# Patient Record
Sex: MALE | Race: WHITE | NOT HISPANIC OR LATINO | Employment: OTHER | URBAN - METROPOLITAN AREA
[De-identification: names, ages, dates, MRNs, and addresses within clinical notes are randomized per-mention and may not be internally consistent; named-entity substitution may affect disease eponyms.]

---

## 2017-01-17 ENCOUNTER — HOSPITAL ENCOUNTER (OUTPATIENT)
Dept: CARDIOLOGY | Facility: HOSPITAL | Age: 67
Discharge: HOME | End: 2017-01-17
Attending: INTERNAL MEDICINE | Admitting: INTERNAL MEDICINE

## 2017-02-08 ENCOUNTER — HOSPITAL ENCOUNTER (OUTPATIENT)
Dept: OTHER | Facility: HOSPITAL | Age: 67
Discharge: HOME | End: 2017-02-08
Attending: INTERNAL MEDICINE | Admitting: INTERNAL MEDICINE

## 2017-07-19 ENCOUNTER — HOSPITAL ENCOUNTER (OUTPATIENT)
Dept: CARDIOLOGY | Facility: HOSPITAL | Age: 67
Discharge: HOME | End: 2017-07-19
Attending: INTERNAL MEDICINE | Admitting: INTERNAL MEDICINE

## 2018-02-27 ENCOUNTER — HOSPITAL ENCOUNTER (OUTPATIENT)
Dept: CARDIOLOGY | Facility: HOSPITAL | Age: 68
Discharge: HOME | End: 2018-02-27
Attending: INTERNAL MEDICINE | Admitting: INTERNAL MEDICINE

## 2018-03-08 ENCOUNTER — HOSPITAL ENCOUNTER (OUTPATIENT)
Dept: OTHER | Facility: HOSPITAL | Age: 68
Discharge: HOME | End: 2018-03-08
Attending: INTERNAL MEDICINE | Admitting: INTERNAL MEDICINE

## 2018-04-04 RX ORDER — ATORVASTATIN CALCIUM 40 MG/1
20 TABLET, FILM COATED ORAL DAILY
COMMUNITY
End: 2019-07-09 | Stop reason: SDUPTHER

## 2018-04-04 RX ORDER — METHOCARBAMOL 750 MG/1
750 TABLET, FILM COATED ORAL 4 TIMES DAILY PRN
COMMUNITY
End: 2019-07-09

## 2018-04-04 RX ORDER — LISINOPRIL 20 MG/1
20 TABLET ORAL DAILY
COMMUNITY
End: 2019-12-17 | Stop reason: SDUPTHER

## 2018-04-04 RX ORDER — NITROGLYCERIN 0.4 MG/1
0.4 TABLET SUBLINGUAL
COMMUNITY
End: 2020-01-14 | Stop reason: SDUPTHER

## 2018-04-04 RX ORDER — AMLODIPINE BESYLATE 5 MG/1
5 TABLET ORAL DAILY
COMMUNITY
End: 2019-11-07 | Stop reason: SDUPTHER

## 2018-04-04 RX ORDER — ASPIRIN 81 MG/1
81 TABLET ORAL DAILY
COMMUNITY

## 2018-04-04 RX ORDER — RANOLAZINE 500 MG/1
500 TABLET, EXTENDED RELEASE ORAL 2 TIMES DAILY
COMMUNITY
End: 2020-03-13 | Stop reason: SDUPTHER

## 2018-04-04 RX ORDER — ISOSORBIDE MONONITRATE 30 MG/1
30 TABLET, EXTENDED RELEASE ORAL DAILY
COMMUNITY
End: 2019-12-17 | Stop reason: SDUPTHER

## 2018-04-04 RX ORDER — CLOPIDOGREL BISULFATE 75 MG/1
75 TABLET ORAL DAILY
COMMUNITY
End: 2019-12-17 | Stop reason: SDUPTHER

## 2018-04-04 RX ORDER — METOPROLOL TARTRATE 50 MG/1
50 TABLET, FILM COATED ORAL 2 TIMES DAILY
COMMUNITY
End: 2019-12-17 | Stop reason: SDUPTHER

## 2018-04-05 ENCOUNTER — OFFICE VISIT (OUTPATIENT)
Dept: CARDIAC SURGERY | Facility: CLINIC | Age: 68
End: 2018-04-05

## 2018-04-05 VITALS
BODY MASS INDEX: 29.98 KG/M2 | HEIGHT: 67 IN | TEMPERATURE: 97.4 F | OXYGEN SATURATION: 96 % | SYSTOLIC BLOOD PRESSURE: 144 MMHG | RESPIRATION RATE: 20 BRPM | DIASTOLIC BLOOD PRESSURE: 94 MMHG | HEART RATE: 53 BPM | WEIGHT: 191 LBS

## 2018-04-05 DIAGNOSIS — I25.718 CORONARY ARTERY DISEASE OF AUTOLOGOUS VEIN BYPASS GRAFT WITH STABLE ANGINA PECTORIS (HCC): Primary | ICD-10-CM

## 2018-04-05 DIAGNOSIS — I34.0 MODERATE MITRAL REGURGITATION: ICD-10-CM

## 2018-04-05 DIAGNOSIS — I25.5 ISCHEMIC CARDIOMYOPATHY: ICD-10-CM

## 2018-04-05 DIAGNOSIS — I50.30 CONGESTIVE HEART FAILURE WITH LV DIASTOLIC DYSFUNCTION, NYHA CLASS 3 (HCC): ICD-10-CM

## 2018-04-05 DIAGNOSIS — I48.0 PAROXYSMAL ATRIAL FIBRILLATION (HCC): ICD-10-CM

## 2018-04-05 DIAGNOSIS — Z95.1 S/P CABG X 4: ICD-10-CM

## 2018-04-05 PROCEDURE — 99204 OFFICE O/P NEW MOD 45 MIN: CPT | Performed by: THORACIC SURGERY (CARDIOTHORACIC VASCULAR SURGERY)

## 2018-04-05 RX ORDER — ATORVASTATIN CALCIUM 20 MG/1
TABLET, FILM COATED ORAL
COMMUNITY
Start: 2018-03-26 | End: 2018-04-05 | Stop reason: DRUGHIGH

## 2018-04-09 PROBLEM — I25.810 CAD (CORONARY ARTERY DISEASE), AUTOLOGOUS VEIN BYPASS GRAFT: Status: ACTIVE | Noted: 2018-04-09

## 2018-04-09 PROBLEM — I25.5 ISCHEMIC CARDIOMYOPATHY: Status: ACTIVE | Noted: 2018-04-09

## 2018-04-09 PROBLEM — Z95.1 S/P CABG X 4: Status: ACTIVE | Noted: 2018-04-09

## 2018-04-09 PROBLEM — I34.0 MODERATE MITRAL REGURGITATION: Status: ACTIVE | Noted: 2018-04-09

## 2018-04-09 PROBLEM — I50.30 CONGESTIVE HEART FAILURE WITH LV DIASTOLIC DYSFUNCTION, NYHA CLASS 3: Status: ACTIVE | Noted: 2018-04-09

## 2018-04-09 PROBLEM — I48.0 PAROXYSMAL ATRIAL FIBRILLATION: Status: ACTIVE | Noted: 2018-04-09

## 2018-04-09 NOTE — PROGRESS NOTES
4/9/2018    Seen on 4/5/18    Chief Complaint     Dyspnea    History of Present Illness:       Dear SAIGE Simon MD and Colleagues,  It was nice to see Kaveh Vences in consultation at your request. He is a 67 y.o. male with a history of Coronary artery disease status post CABG ×4 in the past, paroxysmal atrial fibrillation, diabetes and hypertension, and sleep apnea who has developed progressive shortness of breath and mild anginal symptoms.  He gets short of breath after climbing a flight of stairs however is tolerable and he is angina is low grade, intermittent and he is presently on Ranexa.  He denies syncope, orthopnea, PND or lower extremity edema he had a 2-D echocardiogram that showed an ejection fraction of approximately 40% 45% and showed moderate mitral regurgitation.  He had a cardiac cath today show significant negative coronary lesions and graft disease.  The circumflex system seems to be unprotected, and the EF was 35%.      Patient Active Problem List   Diagnosis   • Moderate mitral regurgitation   • S/P CABG x 4   • CAD (coronary artery disease), autologous vein bypass graft   • Congestive heart failure with LV diastolic dysfunction, NYHA class 3   • Ischemic cardiomyopathy   • Paroxysmal atrial fibrillation       Past Medical History:   Diagnosis Date   • Atrial fibrillation    • Coronary artery disease    • Diabetes mellitus    • Gallstones    • Hyperlipidemia    • Hypertension    • Mitral regurgitation    • Sleep apnea    • Valvular disease        Past Surgical History:   Procedure Laterality Date   • CARDIAC CATHETERIZATION  2006, 2009, 2012, 2018   • CHOLECYSTECTOMY  12/22/2015   • CORONARY ANGIOPLASTY  2006, 2009   • CORONARY ARTERY BYPASS GRAFT  05/11/2012    x2   • ROTATOR CUFF REPAIR Left        No Known Allergies      Current Outpatient Prescriptions:   •  amLODIPine (NORVASC) 5 MG tablet, Take 5 mg by mouth Daily., Disp: , Rfl:   •  aspirin 81 MG EC tablet, Take 81 mg by mouth  Daily., Disp: , Rfl:   •  atorvastatin (LIPITOR) 40 MG tablet, Take 40 mg by mouth Daily., Disp: , Rfl:   •  clopidogrel (PLAVIX) 75 MG tablet, Take 75 mg by mouth Daily., Disp: , Rfl:   •  isosorbide mononitrate (IMDUR) 30 MG 24 hr tablet, Take 30 mg by mouth Daily., Disp: , Rfl:   •  lisinopril (PRINIVIL,ZESTRIL) 20 MG tablet, Take 20 mg by mouth Daily., Disp: , Rfl:   •  metFORMIN (GLUCOPHAGE) 1000 MG tablet, Take 1,000 mg by mouth 2 (Two) Times a Day With Meals., Disp: , Rfl:   •  methocarbamol (ROBAXIN) 750 MG tablet, Take 750 mg by mouth 4 (Four) Times a Day As Needed for Muscle Spasms., Disp: , Rfl:   •  metoprolol tartrate (LOPRESSOR) 50 MG tablet, Take 50 mg by mouth 2 (Two) Times a Day., Disp: , Rfl:   •  nitroglycerin (NITROSTAT) 0.4 MG SL tablet, Place 0.4 mg under the tongue Every 5 (Five) Minutes As Needed for Chest Pain. Take no more than 3 doses in 15 minutes., Disp: , Rfl:   •  ranolazine (RANEXA) 500 MG 12 hr tablet, Take 500 mg by mouth 2 (Two) Times a Day., Disp: , Rfl:     Social History     Social History   • Marital status:      Spouse name: N/A   • Number of children: N/A   • Years of education: N/A     Occupational History   • Not on file.     Social History Main Topics   • Smoking status: Never Smoker   • Smokeless tobacco: Never Used   • Alcohol use No   • Drug use: No   • Sexual activity: Not on file     Other Topics Concern   • Not on file     Social History Narrative   • No narrative on file       Family History   Problem Relation Age of Onset   • Coronary artery disease Father    • Coronary artery disease Brother    • Heart attack Brother    • Stroke Brother    • Diabetes Maternal Uncle      Review of Systems   Constitutional: Positive for fatigue.   Respiratory: Positive for chest tightness and shortness of breath. Negative for wheezing.    Cardiovascular: Positive for palpitations. Negative for leg swelling.   Neurological: Negative for dizziness and syncope.   All other  systems reviewed and are negative.      Physical Exam:    Vital Signs:  Weight: 86.6 kg (191 lb)   Body mass index is 29.91 kg/m².  Temp: 97.4 °F (36.3 °C)   Heart Rate: 53   BP: 144/94     Physical Exam   Constitutional: He is oriented to person, place, and time. He appears well-developed and well-nourished.   HENT:   Head: Normocephalic and atraumatic.   Nose: Nose normal.   Mouth/Throat: Oropharynx is clear and moist.   Eyes: Conjunctivae are normal. Pupils are equal, round, and reactive to light.   Neck: Normal range of motion. Neck supple. No JVD present. No thyromegaly present.   Cardiovascular: Normal rate, regular rhythm and intact distal pulses.  PMI is not displaced.  Exam reveals no gallop and no friction rub.    Murmur heard.  Pulses:       Radial pulses are 2+ on the right side, and 2+ on the left side.        Dorsalis pedis pulses are 2+ on the right side, and 2+ on the left side.   2/6 systolic murmur in left sternal border   Pulmonary/Chest: Effort normal and breath sounds normal. He has no rales.   Abdominal: Soft. Bowel sounds are normal. He exhibits no distension and no mass. There is no tenderness.   Musculoskeletal: Normal range of motion. He exhibits no tenderness or deformity.   Lymphadenopathy:     He has no cervical adenopathy.   Neurological: He is alert and oriented to person, place, and time. He has normal reflexes.   Skin: Skin is warm and dry. No rash noted. No erythema.   Psychiatric: He has a normal mood and affect. His behavior is normal.   No groin or neck adenomegalies     Assessment:     Problem List Items Addressed This Visit        Cardiovascular and Mediastinum    Moderate mitral regurgitation    Relevant Medications    ranolazine (RANEXA) 500 MG 12 hr tablet    isosorbide mononitrate (IMDUR) 30 MG 24 hr tablet    amLODIPine (NORVASC) 5 MG tablet    metoprolol tartrate (LOPRESSOR) 50 MG tablet    clopidogrel (PLAVIX) 75 MG tablet    nitroglycerin (NITROSTAT) 0.4 MG SL tablet     CAD (coronary artery disease), autologous vein bypass graft - Primary    Relevant Medications    ranolazine (RANEXA) 500 MG 12 hr tablet    isosorbide mononitrate (IMDUR) 30 MG 24 hr tablet    amLODIPine (NORVASC) 5 MG tablet    metoprolol tartrate (LOPRESSOR) 50 MG tablet    clopidogrel (PLAVIX) 75 MG tablet    nitroglycerin (NITROSTAT) 0.4 MG SL tablet    Congestive heart failure with LV diastolic dysfunction, NYHA class 3    Relevant Medications    ranolazine (RANEXA) 500 MG 12 hr tablet    isosorbide mononitrate (IMDUR) 30 MG 24 hr tablet    amLODIPine (NORVASC) 5 MG tablet    metoprolol tartrate (LOPRESSOR) 50 MG tablet    clopidogrel (PLAVIX) 75 MG tablet    nitroglycerin (NITROSTAT) 0.4 MG SL tablet    Ischemic cardiomyopathy    Relevant Medications    ranolazine (RANEXA) 500 MG 12 hr tablet    isosorbide mononitrate (IMDUR) 30 MG 24 hr tablet    amLODIPine (NORVASC) 5 MG tablet    metoprolol tartrate (LOPRESSOR) 50 MG tablet    clopidogrel (PLAVIX) 75 MG tablet    nitroglycerin (NITROSTAT) 0.4 MG SL tablet    Paroxysmal atrial fibrillation    Relevant Medications    ranolazine (RANEXA) 500 MG 12 hr tablet    isosorbide mononitrate (IMDUR) 30 MG 24 hr tablet    amLODIPine (NORVASC) 5 MG tablet    metoprolol tartrate (LOPRESSOR) 50 MG tablet    clopidogrel (PLAVIX) 75 MG tablet    nitroglycerin (NITROSTAT) 0.4 MG SL tablet       Other    S/P CABG x 4      Other Visit Diagnoses    None.       Moderate mitral regurgitation  CHF class 2/3  Single-vessel coronary artery disease with and protected circumflex territory  Paroxysmal atrial fibrillation  Status post CABG with mild angina    Recommendation/Plan:     Recommend medical management for now.  The MR is not significant enough to justify surgery and I don't justify a reoperation to bypass the marginal branch, at at least with that lower level of symptoms.  There is no minimally invasive approach to complete a redo CABG to the circumflex.  Maybe high risk PCI  to the left main could be an option.    Thank you for allowing me to participate in his care.    Regards,    Thang Valdez M.D.

## 2018-10-05 ENCOUNTER — HOSPITAL ENCOUNTER (OUTPATIENT)
Dept: CARDIOLOGY | Facility: HOSPITAL | Age: 68
Discharge: HOME | End: 2018-10-05
Attending: INTERNAL MEDICINE | Admitting: INTERNAL MEDICINE

## 2018-12-06 ENCOUNTER — HOSPITAL ENCOUNTER (OUTPATIENT)
Dept: ORTHOPEDIC SURGERY | Facility: CLINIC | Age: 68
Discharge: HOME | End: 2018-12-06
Attending: PHYSICIAN ASSISTANT | Admitting: PHYSICIAN ASSISTANT

## 2018-12-13 ENCOUNTER — HOSPITAL ENCOUNTER (OUTPATIENT)
Dept: MRI IMAGING | Facility: HOSPITAL | Age: 68
Discharge: HOME | End: 2018-12-13
Attending: PHYSICIAN ASSISTANT | Admitting: PHYSICIAN ASSISTANT

## 2018-12-27 ENCOUNTER — HOSPITAL ENCOUNTER (OUTPATIENT)
Dept: OTHER | Facility: HOSPITAL | Age: 68
Discharge: HOME | End: 2018-12-27
Attending: NEUROLOGICAL SURGERY | Admitting: NEUROLOGICAL SURGERY

## 2018-12-27 LAB
ABO + RH BLD: NORMAL
ANION GAP SERPL CALC-SCNC: 9.8 MMOL/L (ref 10–20)
APTT BLD: 22.9 SEC (ref 24–31)
ARMBAND: NORMAL
BASOPHILS # BLD AUTO: 0 10*3/UL (ref 0–0.2)
BASOPHILS NFR BLD AUTO: 1 % (ref 0–2)
BILIRUB UR QL STRIP: NEGATIVE MG/DL
BLD COMPONENT TYPE: NORMAL
BLD GP AB SCN SERPL QL: NEGATIVE
BUN SERPL-MCNC: 20 MG/DL (ref 8–20)
BUN/CREAT SERPL: 25 (ref 6.2–20.3)
CALCIUM SERPL-MCNC: 9.1 MG/DL (ref 8.9–10.3)
CASTS URNS QL MICRO: NORMAL /[LPF]
CHLORIDE SERPL-SCNC: 105 MMOL/L (ref 101–111)
COLOR UR: YELLOW
CONV BACTERIA IN URINE MICRO: NEGATIVE
CONV CLARITY OF URINE: CLEAR
CONV CO2: 27 MMOL/L (ref 22–32)
CONV HYALINE CASTS IN URINE MICRO: 1 /[LPF] (ref 0–5)
CONV PROTEIN IN URINE BY AUTOMATED TEST STRIP: NEGATIVE MG/DL
CONV SMALL ROUND CELLS: NORMAL /[HPF]
CONV UROBILINOGEN IN URINE BY AUTOMATED TEST STRIP: 1 MG/DL
CREAT UR-MCNC: 0.8 MG/DL (ref 0.7–1.2)
CROSSMATCH EXPIRATION: NORMAL
CULTURE INDICATED?: NORMAL
DIFFERENTIAL METHOD BLD: (no result)
EOSINOPHIL # BLD AUTO: 0.1 10*3/UL (ref 0–0.3)
EOSINOPHIL # BLD AUTO: 1 % (ref 0–3)
ERYTHROCYTE [DISTWIDTH] IN BLOOD BY AUTOMATED COUNT: 13.4 % (ref 11.5–14.5)
GLUCOSE SERPL-MCNC: 114 MG/DL (ref 65–99)
GLUCOSE UR QL: NEGATIVE MG/DL
HCT VFR BLD AUTO: 43 % (ref 40–54)
HGB BLD-MCNC: 14.4 G/DL (ref 14–18)
HGB UR QL STRIP: NEGATIVE
INR PPP: 1
KETONES UR QL STRIP: NEGATIVE MG/DL
LEUKOCYTE ESTERASE UR QL STRIP: NEGATIVE
LYMPHOCYTES # BLD AUTO: 1.3 10*3/UL (ref 0.8–4.8)
LYMPHOCYTES NFR BLD AUTO: 36 % (ref 18–42)
MCH RBC QN AUTO: 32.5 PG (ref 26–32)
MCHC RBC AUTO-ENTMCNC: 33.5 G/DL (ref 32–36)
MCV RBC AUTO: 97 FL (ref 80–94)
MONOCYTES # BLD AUTO: 0.5 10*3/UL (ref 0.1–1.3)
MONOCYTES NFR BLD AUTO: 13 % (ref 2–11)
NEUTROPHILS # BLD AUTO: 1.8 10*3/UL (ref 2.3–8.6)
NEUTROPHILS NFR BLD AUTO: 49 % (ref 50–75)
NITRITE UR QL STRIP: NEGATIVE
NRBC BLD AUTO-RTO: 0 /100{WBCS}
NRBC/RBC NFR BLD MANUAL: 0 10*3/UL
PH UR STRIP.AUTO: 5.5 [PH] (ref 4.5–8)
PLATELET # BLD AUTO: 199 10*3/UL (ref 150–450)
PMV BLD AUTO: 7.6 FL (ref 7.4–10.4)
POTASSIUM SERPL-SCNC: 3.8 MMOL/L (ref 3.6–5.1)
PROTHROMBIN TIME: 10.1 SEC (ref 9.6–11.7)
RBC # BLD AUTO: 4.43 10*6/UL (ref 4.6–6)
RBC #/AREA URNS HPF: 1 /[HPF] (ref 0–3)
SODIUM SERPL-SCNC: 138 MMOL/L (ref 136–144)
SP GR UR: 1.02 (ref 1–1.03)
SPERM URNS QL MICRO: NORMAL /[HPF]
SQUAMOUS SPT QL MICRO: 0 /[HPF] (ref 0–5)
UNIDENT CRYS URNS QL MICRO: NORMAL /[HPF]
WBC # BLD AUTO: 3.8 10*3/UL (ref 4.5–11.5)
WBC #/AREA URNS HPF: 1 /[HPF] (ref 0–5)
YEAST SPEC QL WET PREP: NORMAL /[HPF]

## 2019-01-02 ENCOUNTER — HOSPITAL ENCOUNTER (OUTPATIENT)
Dept: PREOP | Facility: HOSPITAL | Age: 69
Setting detail: HOSPITAL OUTPATIENT SURGERY
Discharge: HOME | End: 2019-01-02
Attending: NEUROLOGICAL SURGERY | Admitting: NEUROLOGICAL SURGERY

## 2019-01-02 LAB
GLUCOSE BLD-MCNC: 138 MG/DL (ref 70–105)
GLUCOSE BLD-MCNC: 198 MG/DL (ref 70–105)
GLUCOSE BLD-MCNC: 223 MG/DL (ref 70–105)

## 2019-04-03 ENCOUNTER — CONVERSION ENCOUNTER (OUTPATIENT)
Dept: FAMILY MEDICINE CLINIC | Facility: CLINIC | Age: 69
End: 2019-04-03

## 2019-04-05 ENCOUNTER — CONVERSION ENCOUNTER (OUTPATIENT)
Dept: FAMILY MEDICINE CLINIC | Facility: CLINIC | Age: 69
End: 2019-04-05

## 2019-04-09 ENCOUNTER — CONVERSION ENCOUNTER (OUTPATIENT)
Dept: FAMILY MEDICINE CLINIC | Facility: CLINIC | Age: 69
End: 2019-04-09

## 2019-04-10 ENCOUNTER — CONVERSION ENCOUNTER (OUTPATIENT)
Dept: FAMILY MEDICINE CLINIC | Facility: CLINIC | Age: 69
End: 2019-04-10

## 2019-04-17 ENCOUNTER — CONVERSION ENCOUNTER (OUTPATIENT)
Dept: FAMILY MEDICINE CLINIC | Facility: CLINIC | Age: 69
End: 2019-04-17

## 2019-04-19 ENCOUNTER — CONVERSION ENCOUNTER (OUTPATIENT)
Dept: FAMILY MEDICINE CLINIC | Facility: CLINIC | Age: 69
End: 2019-04-19

## 2019-04-26 ENCOUNTER — CONVERSION ENCOUNTER (OUTPATIENT)
Dept: FAMILY MEDICINE CLINIC | Facility: CLINIC | Age: 69
End: 2019-04-26

## 2019-04-29 ENCOUNTER — CONVERSION ENCOUNTER (OUTPATIENT)
Dept: FAMILY MEDICINE CLINIC | Facility: CLINIC | Age: 69
End: 2019-04-29

## 2019-04-30 ENCOUNTER — CONVERSION ENCOUNTER (OUTPATIENT)
Dept: FAMILY MEDICINE CLINIC | Facility: CLINIC | Age: 69
End: 2019-04-30

## 2019-05-16 ENCOUNTER — CONVERSION ENCOUNTER (OUTPATIENT)
Dept: FAMILY MEDICINE CLINIC | Facility: CLINIC | Age: 69
End: 2019-05-16

## 2019-06-03 VITALS
OXYGEN SATURATION: 98 % | OXYGEN SATURATION: 99 % | HEART RATE: 69 BPM | DIASTOLIC BLOOD PRESSURE: 77 MMHG | OXYGEN SATURATION: 99 % | HEART RATE: 68 BPM | HEIGHT: 66 IN | HEART RATE: 52 BPM | SYSTOLIC BLOOD PRESSURE: 110 MMHG | SYSTOLIC BLOOD PRESSURE: 136 MMHG | HEART RATE: 60 BPM | DIASTOLIC BLOOD PRESSURE: 76 MMHG | BODY MASS INDEX: 27.28 KG/M2 | DIASTOLIC BLOOD PRESSURE: 63 MMHG | SYSTOLIC BLOOD PRESSURE: 108 MMHG | HEIGHT: 66 IN | SYSTOLIC BLOOD PRESSURE: 116 MMHG | SYSTOLIC BLOOD PRESSURE: 131 MMHG | OXYGEN SATURATION: 93 % | BODY MASS INDEX: 15.98 KG/M2 | OXYGEN SATURATION: 99 % | HEIGHT: 66 IN | HEIGHT: 66 IN | BODY MASS INDEX: 27.28 KG/M2 | HEART RATE: 70 BPM | HEART RATE: 60 BPM | OXYGEN SATURATION: 99 % | HEIGHT: 66 IN | HEIGHT: 66 IN | DIASTOLIC BLOOD PRESSURE: 66 MMHG | OXYGEN SATURATION: 99 % | BODY MASS INDEX: 15.98 KG/M2 | DIASTOLIC BLOOD PRESSURE: 64 MMHG | HEART RATE: 62 BPM | SYSTOLIC BLOOD PRESSURE: 106 MMHG | BODY MASS INDEX: 27.28 KG/M2 | BODY MASS INDEX: 15.98 KG/M2 | HEART RATE: 56 BPM | SYSTOLIC BLOOD PRESSURE: 124 MMHG | DIASTOLIC BLOOD PRESSURE: 67 MMHG | HEART RATE: 62 BPM | OXYGEN SATURATION: 96 % | HEIGHT: 66 IN | DIASTOLIC BLOOD PRESSURE: 63 MMHG | SYSTOLIC BLOOD PRESSURE: 113 MMHG | BODY MASS INDEX: 27.28 KG/M2 | DIASTOLIC BLOOD PRESSURE: 56 MMHG | DIASTOLIC BLOOD PRESSURE: 74 MMHG | SYSTOLIC BLOOD PRESSURE: 109 MMHG

## 2019-06-04 VITALS — HEART RATE: 75 BPM | DIASTOLIC BLOOD PRESSURE: 78 MMHG | OXYGEN SATURATION: 98 % | SYSTOLIC BLOOD PRESSURE: 127 MMHG

## 2019-06-06 NOTE — PROGRESS NOTES
Vital Signs:    Patient Profile:    68 Years Old Male  Height:     66 inches (172.72 cm)  O2 Sat:     98 %  Pulse rate: 78 / minute  BP Sittin / 78              Serial Vital Signs/Assessments:      Time               Position             BP                  Pulse    Resp    Temp   By                  114/85      75          Beata Wade CMA                                                               PEF      PreRx  PostRx  Time            O2 Sat   O2 Type         L/min   L/min   L/min    By          98 %                        Beata Wade CMA    Comments:  [facility] Session # 34  Pt tolerated procedure without any complaints.  No SOA, diaphoresis or leg pain noted.  HR and BP were stable.   By: Beata Wade CMA        Plan   Updated Medication List:   MELOXICAM 15 MG ORAL TABLET (MELOXICAM) take 1 tablet by mouth every day  RANEXA 500 MG ORAL TABLET EXTENDED RELEASE 12 HOUR (RANOLAZINE) 1 po BID  GABAPENTIN 300 MG CAPS (GABAPENTIN) TAKE 2 CAPSULE BY MOUTH EVERY 12 HOURS x 1 wk, then decrease to 1 tablet BID  HYDROCODONE-ACETAMINOPHEN 5-325 MG ORAL TABLET (HYDROCODONE-ACETAMINOPHEN) 1 po q 12 hours for prn pain  ATORVASTATIN CALCIUM 20 MG TABLET (ATORVASTATIN CALCIUM) TAKE 1 TABLET EVERY DAY  ISOSORBIDE MONONITRATE ER 30 MG ORAL TABLET EXTENDED RELEASE 24 HOUR (ISOSORBIDE MONONITRATE) Take once a day by mouth.  LISINOPRIL 20 MG TABLET (LISINOPRIL) TAKE 1 TABLET TWICE DAILY  AMLODIPINE BESYLATE 5 MG TABLET (AMLODIPINE BESYLATE) TAKE 1 TABLET EVERY DAY  METOPROLOL TARTRATE 50 MG TABLET (METOPROLOL TARTRATE) TAKE 1 TABLET TWICE DAILY  CLOPIDOGREL BISULFATE 75 MG TABS (CLOPIDOGREL BISULFATE) TAKE 1 TABLET EVERY DAY BY MOUTH  GLUCOPHAGE 1000 MG ORAL TABLET (METFORMIN HCL) Take one (1) tablet by mouth twice a day  ASPIRIN 81 MG ORAL TABLET (ASPIRIN) Take one by mouth daily  NITROSTAT 0.4 MG SUBLINGUAL TABLET SUBLINGUAL (NITROGLYCERIN) Place one tablet under tongue for chest pain as directed - PRN    New  Orders:   [facility] [CPT-]                    Medication Administration    Orders Added:  1)  [facility] [CPT-]        Electronically signed by Beata Wade CMA on 05/23/2019 at 11:21 AM  ________________________________________________________________________       Disclaimer: Converted Note message may not contain all data elements that existed in the legacy source system. Please see [employer] for the original note details.

## 2019-07-09 ENCOUNTER — OFFICE VISIT (OUTPATIENT)
Dept: CARDIOLOGY | Facility: CLINIC | Age: 69
End: 2019-07-09

## 2019-07-09 VITALS
BODY MASS INDEX: 28.94 KG/M2 | HEIGHT: 67 IN | WEIGHT: 184.4 LBS | RESPIRATION RATE: 18 BRPM | DIASTOLIC BLOOD PRESSURE: 70 MMHG | OXYGEN SATURATION: 95 % | SYSTOLIC BLOOD PRESSURE: 115 MMHG | HEART RATE: 58 BPM

## 2019-07-09 DIAGNOSIS — I34.0 MODERATE MITRAL REGURGITATION: ICD-10-CM

## 2019-07-09 DIAGNOSIS — I25.5 ISCHEMIC CARDIOMYOPATHY: ICD-10-CM

## 2019-07-09 DIAGNOSIS — I48.0 PAROXYSMAL ATRIAL FIBRILLATION (HCC): ICD-10-CM

## 2019-07-09 DIAGNOSIS — Z95.1 S/P CABG X 4: ICD-10-CM

## 2019-07-09 DIAGNOSIS — I25.718 CORONARY ARTERY DISEASE OF AUTOLOGOUS VEIN BYPASS GRAFT WITH STABLE ANGINA PECTORIS (HCC): Primary | ICD-10-CM

## 2019-07-09 DIAGNOSIS — I50.30 CONGESTIVE HEART FAILURE WITH LV DIASTOLIC DYSFUNCTION, NYHA CLASS 3 (HCC): ICD-10-CM

## 2019-07-09 PROCEDURE — 93000 ELECTROCARDIOGRAM COMPLETE: CPT | Performed by: INTERNAL MEDICINE

## 2019-07-09 PROCEDURE — 99214 OFFICE O/P EST MOD 30 MIN: CPT | Performed by: INTERNAL MEDICINE

## 2019-07-09 RX ORDER — ATORVASTATIN CALCIUM 20 MG/1
20 TABLET, FILM COATED ORAL DAILY
COMMUNITY
End: 2019-08-29 | Stop reason: SDUPTHER

## 2019-07-09 RX ORDER — HYDROCODONE BITARTRATE AND ACETAMINOPHEN 5; 325 MG/1; MG/1
1 TABLET ORAL EVERY 6 HOURS PRN
COMMUNITY
End: 2020-03-02

## 2019-07-09 NOTE — PROGRESS NOTES
Subjective:     Encounter Date:07/09/2019      Patient ID: Kaveh Vences is a 68 y.o. male.    Chief Complaint:      Dear Skinny Greene,    Mr. Kaveh Vences Is a very pleasant 67 years old gentleman with history of diabetes,  coronary artery disease, status post previous CABG in 2012, and PCI stenting.     cardiac catheterization with graft angiography showed LV ejection fraction of 40%, severe native three-vessel coronary disease with 90% calcified stenosis involving the distal left main involving the ostium of the LAD significant 99% lesion of left circumflex coronary artery.  100% occlusion of the nondominant right coronary artery was noted as well.  [place] to the LAD was patent and saphenous vein graft to marginal branch was patent as well.    Echocardiogram with LV ejection fraction of 45% with moderate mitral regurgitation  Denies any further symptoms of chest pain shortness of breath dizziness or syncope  Good functional capacity   Plan is patient conservatively from cardiac standpoint   continue current medical therapy   patient is already on maximal medical therapy   continue close monitoring  Consider repeat echocardiogram next year to assess the severity of the mitral regurgitation   follow-up in office 6 months        The following portions of the patient's history were reviewed and updated as appropriate: allergies, current medications, past family history, past medical history, past social history, past surgical history and problem list.    No Known Allergies      Current Outpatient Medications:   •  amLODIPine (NORVASC) 5 MG tablet, Take 5 mg by mouth Daily., Disp: , Rfl:   •  aspirin 81 MG EC tablet, Take 81 mg by mouth Daily., Disp: , Rfl:   •  atorvastatin (LIPITOR) 20 MG tablet, Take 20 mg by mouth Daily., Disp: , Rfl:   •  clopidogrel (PLAVIX) 75 MG tablet, Take 75 mg by mouth Daily., Disp: , Rfl:   •  HYDROcodone-acetaminophen (NORCO) 5-325 MG per tablet, Take 1 tablet by mouth Every 6 (Six) Hours As  Needed., Disp: , Rfl:   •  isosorbide mononitrate (IMDUR) 30 MG 24 hr tablet, Take 30 mg by mouth Daily., Disp: , Rfl:   •  lisinopril (PRINIVIL,ZESTRIL) 20 MG tablet, Take 20 mg by mouth Daily., Disp: , Rfl:   •  metFORMIN (GLUCOPHAGE) 1000 MG tablet, Take 1,000 mg by mouth 2 (Two) Times a Day With Meals., Disp: , Rfl:   •  metoprolol tartrate (LOPRESSOR) 50 MG tablet, Take 50 mg by mouth 2 (Two) Times a Day., Disp: , Rfl:   •  nitroglycerin (NITROSTAT) 0.4 MG SL tablet, Place 0.4 mg under the tongue Every 5 (Five) Minutes As Needed for Chest Pain. Take no more than 3 doses in 15 minutes., Disp: , Rfl:   •  ranolazine (RANEXA) 500 MG 12 hr tablet, Take 500 mg by mouth 2 (Two) Times a Day., Disp: , Rfl:     Family History   Problem Relation Age of Onset   • Coronary artery disease Father    • Coronary artery disease Brother    • Heart attack Brother    • Stroke Brother    • Diabetes Maternal Uncle        Past Medical History:   Diagnosis Date   • Atrial fibrillation (CMS/HCC)    • Coronary artery disease    • Diabetes mellitus (CMS/HCC)    • Gallstones    • Hyperlipidemia    • Hypertension    • Mitral regurgitation    • Sleep apnea    • Valvular disease        Past Surgical History:   Procedure Laterality Date   • BACK SURGERY  01/2019   • CARDIAC CATHETERIZATION  2006, 2009, 2012, 2018   • CHOLECYSTECTOMY  12/22/2015   • CORONARY ANGIOPLASTY  2006, 2009   • CORONARY ARTERY BYPASS GRAFT  05/11/2012    x2   • ROTATOR CUFF REPAIR Left        Social History     Socioeconomic History   • Marital status:      Spouse name: Not on file   • Number of children: Not on file   • Years of education: Not on file   • Highest education level: Not on file   Tobacco Use   • Smoking status: Never Smoker   • Smokeless tobacco: Never Used   Substance and Sexual Activity   • Alcohol use: No   • Drug use: No       Review of Systems   Constitution: Negative for chills, decreased appetite and malaise/fatigue.   HENT: Negative for  congestion.    Eyes: Negative for blurred vision and double vision.   Cardiovascular: Negative for chest pain, dyspnea on exertion, leg swelling, orthopnea and syncope.   Respiratory: Negative for cough and shortness of breath.    Hematologic/Lymphatic: Negative for adenopathy. Does not bruise/bleed easily.   Skin: Negative for rash.   Gastrointestinal: Negative for bloating and abdominal pain.   Neurological: Negative for dizziness and focal weakness.            Objective:         Vitals:    07/09/19 0944   BP: 115/70   Pulse: 58   Resp: 18   SpO2: 95%       Physical Exam   Constitutional: He is oriented to person, place, and time. He appears well-developed and well-nourished.   HENT:   Head: Normocephalic and atraumatic.   Eyes: Conjunctivae are normal. Pupils are equal, round, and reactive to light.   Neck: Normal range of motion. Neck supple. No thyromegaly present.   Cardiovascular: Normal rate, regular rhythm, S1 normal, S2 normal and intact distal pulses.   Pulmonary/Chest: Effort normal and breath sounds normal.   Abdominal: Soft. Bowel sounds are normal.   Musculoskeletal: He exhibits no edema.   Neurological: He is alert and oriented to person, place, and time.   Skin: Skin is warm.   Nursing note and vitals reviewed.      Lab/EKG/Echo Review:   EKG: normal EKG, normal sinus rhythm, unchanged from previous tracings, LBBB.

## 2019-08-30 RX ORDER — ATORVASTATIN CALCIUM 20 MG/1
TABLET, FILM COATED ORAL
Qty: 90 TABLET | Refills: 2 | Status: SHIPPED | OUTPATIENT
Start: 2019-08-30 | End: 2020-03-13 | Stop reason: SDUPTHER

## 2019-10-17 ENCOUNTER — OFFICE VISIT (OUTPATIENT)
Dept: NEUROSURGERY | Facility: CLINIC | Age: 69
End: 2019-10-17

## 2019-10-17 VITALS
SYSTOLIC BLOOD PRESSURE: 121 MMHG | HEART RATE: 47 BPM | BODY MASS INDEX: 29.19 KG/M2 | HEIGHT: 67 IN | WEIGHT: 186 LBS | DIASTOLIC BLOOD PRESSURE: 65 MMHG

## 2019-10-17 DIAGNOSIS — M54.50 ACUTE MIDLINE LOW BACK PAIN WITHOUT SCIATICA: Primary | ICD-10-CM

## 2019-10-17 DIAGNOSIS — T14.8XXA MUSCLE STRAIN: ICD-10-CM

## 2019-10-17 PROCEDURE — 99213 OFFICE O/P EST LOW 20 MIN: CPT | Performed by: PHYSICIAN ASSISTANT

## 2019-10-17 RX ORDER — MELOXICAM 15 MG/1
15 TABLET ORAL DAILY
Qty: 30 TABLET | Refills: 2 | Status: SHIPPED | OUTPATIENT
Start: 2019-10-17 | End: 2020-01-14

## 2019-10-17 RX ORDER — TIZANIDINE 2 MG/1
2 TABLET ORAL EVERY 8 HOURS PRN
Qty: 60 TABLET | Refills: 0 | Status: SHIPPED | OUTPATIENT
Start: 2019-10-17 | End: 2020-03-02

## 2019-10-17 NOTE — PROGRESS NOTES
Subjective     Chief Complaint   Patient presents with   • Back Pain     lumbar pain that comes and goes. increased pain in the last 3 weeks after bending over to use a chainsaw for a long period of time.       Patient ID: Kaveh Vences is a 68 y.o. male       History of Present Illness  Mr Kaveh Vences is a 68-year-old male presents the office today as an established patient.  January 2019 the patient underwent L2-3 discectomy.  He had complete resolution of his back pain and left lower extremity pain but he still some numbness and tingling.  Patient has not follow-up in our office since then since the pain had resolved.  However he states that approximately 1 month ago he developed some lower back pain.  Pain does not radiate down his legs and he has no weakness in his legs.  He did have some excruciating back pain when he was trying to cut wood with a chain saw the other day.  The pain did resolve after he discontinued this activity.  He currently does not take any medications on a regular basis.      The following portions of the patient's history were reviewed and updated as appropriate: allergies, current medications, past family history, past medical history, past social history, past surgical history and problem list    Past Medical History:   Diagnosis Date   • Atrial fibrillation (CMS/HCC)    • Coronary artery disease    • Diabetes mellitus (CMS/HCC)    • Gallstones    • Hyperlipidemia    • Hypertension    • Mitral regurgitation    • Sleep apnea    • Valvular disease        Past Surgical History:   Procedure Laterality Date   • BACK SURGERY  01/2019   • CARDIAC CATHETERIZATION  2006, 2009, 2012, 2018   • CHOLECYSTECTOMY  12/22/2015   • CORONARY ANGIOPLASTY  2006, 2009   • CORONARY ARTERY BYPASS GRAFT  05/11/2012    x2   • ROTATOR CUFF REPAIR Left          Current Outpatient Medications:   •  amLODIPine (NORVASC) 5 MG tablet, Take 5 mg by mouth Daily., Disp: , Rfl:   •  aspirin 81 MG EC tablet, Take 81 mg by  "mouth Daily., Disp: , Rfl:   •  atorvastatin (LIPITOR) 20 MG tablet, TAKE 1 TABLET EVERY DAY, Disp: 90 tablet, Rfl: 2  •  clopidogrel (PLAVIX) 75 MG tablet, Take 75 mg by mouth Daily., Disp: , Rfl:   •  HYDROcodone-acetaminophen (NORCO) 5-325 MG per tablet, Take 1 tablet by mouth Every 6 (Six) Hours As Needed., Disp: , Rfl:   •  isosorbide mononitrate (IMDUR) 30 MG 24 hr tablet, Take 30 mg by mouth Daily., Disp: , Rfl:   •  lisinopril (PRINIVIL,ZESTRIL) 20 MG tablet, Take 20 mg by mouth Daily., Disp: , Rfl:   •  metFORMIN (GLUCOPHAGE) 1000 MG tablet, Take 1,000 mg by mouth 2 (Two) Times a Day With Meals., Disp: , Rfl:   •  metoprolol tartrate (LOPRESSOR) 50 MG tablet, Take 50 mg by mouth 2 (Two) Times a Day., Disp: , Rfl:   •  nitroglycerin (NITROSTAT) 0.4 MG SL tablet, Place 0.4 mg under the tongue Every 5 (Five) Minutes As Needed for Chest Pain. Take no more than 3 doses in 15 minutes., Disp: , Rfl:   •  ranolazine (RANEXA) 500 MG 12 hr tablet, Take 500 mg by mouth 2 (Two) Times a Day., Disp: , Rfl:   •  meloxicam (MOBIC) 15 MG tablet, Take 1 tablet by mouth Daily., Disp: 30 tablet, Rfl: 2  •  tiZANidine (ZANAFLEX) 2 MG tablet, Take 1 tablet by mouth Every 8 (Eight) Hours As Needed for Muscle Spasms. Start with one at night, may increased to TID as needed, Disp: 60 tablet, Rfl: 0    Review of Systems   Constitutional: Negative for activity change, appetite change, chills, diaphoresis, fatigue, fever and unexpected weight change.   Musculoskeletal: Positive for back pain. Negative for gait problem and neck pain.   Neurological: Negative for dizziness, weakness, numbness and headaches.   Psychiatric/Behavioral: Negative for sleep disturbance.         Objective     Visit Vitals  /65 (BP Location: Left arm, Patient Position: Sitting, Cuff Size: Adult)   Pulse (!) 47   Ht 170.2 cm (67\")   Wt 84.4 kg (186 lb)   BMI 29.13 kg/m²       Physical Exam   Constitutional: He is oriented to person, place, and time. He " appears well-developed and well-nourished.   HENT:   Head: Normocephalic and atraumatic.   Eyes: Pupils are equal, round, and reactive to light.   Neck: Normal range of motion.   Pulmonary/Chest: Effort normal.   Abdominal: Soft.   Musculoskeletal: Normal range of motion.   Neurological: He is alert and oriented to person, place, and time. Gait normal.   Skin: Skin is warm and dry.   Psychiatric: He has a normal mood and affect.   Vitals reviewed.      Neurologic Exam     Mental Status   Oriented to person, place, and time.     Cranial Nerves     CN III, IV, VI   Pupils are equal, round, and reactive to light.    Motor Exam   Muscle bulk: normal  Overall muscle tone: normal    Sensory Exam   Light touch normal.     Gait, Coordination, and Reflexes     Gait  Gait: normal      Back Exam     Comments:  No midline tenderness to palpation.  No pain with movement.  No sacroiliac pain.              Assessment/Plan       Independent Review of Radiographic Studies:    Flexion-extension x-rays performed in the office today.  Is not demonstrating signs of instability in his back.    Acute midline low back pain without sciatica [M54.5]  Kaveh was seen today for back pain.    Diagnoses and all orders for this visit:    Acute midline low back pain without sciatica  -     XR Spine Lumbar Flex & Ext  -     Ambulatory Referral to Physical Therapy Evaluate and treat    Muscle strain  -     Ambulatory Referral to Physical Therapy Evaluate and treat    Other orders  -     meloxicam (MOBIC) 15 MG tablet; Take 1 tablet by mouth Daily.  -     tiZANidine (ZANAFLEX) 2 MG tablet; Take 1 tablet by mouth Every 8 (Eight) Hours As Needed for Muscle Spasms. Start with one at night, may increased to TID as needed        Since the patient is postop from a microdiscectomy with new back pain, we performed flexion-extension x-rays in the office today.  These do not demonstrate any signs of instability in his back.    At this time we are going to start  the patient on another course of meloxicam 15 mg that he can take a daily basis as well as tizanidine 2 mg that he can start taking at night and increase up to 3 times a day as needed.  Variance in the patient to physical therapy and undergo deep tissue massage to his lower back.    No focal neurological deficits nothing that would indicate or warrant an emergent MRI of his lumbar spine.  Time may be that we need to refer him to pain management undergo repeat injections into his back.    He is to follow back up in 6 weeks or sooner for any new or worsening conditions.      Return in about 6 weeks (around 11/28/2019).

## 2019-10-30 ENCOUNTER — TREATMENT (OUTPATIENT)
Dept: PHYSICAL THERAPY | Facility: CLINIC | Age: 69
End: 2019-10-30

## 2019-10-30 DIAGNOSIS — M54.50 ACUTE MIDLINE LOW BACK PAIN WITHOUT SCIATICA: Primary | ICD-10-CM

## 2019-10-30 PROCEDURE — 97161 PT EVAL LOW COMPLEX 20 MIN: CPT | Performed by: PHYSICAL THERAPIST

## 2019-10-30 NOTE — PROGRESS NOTES
Physical Therapy Initial Evaluation and Plan of Care      Patient: Kaveh Vences   : 1950  Diagnosis/ICD-10 Code:  Acute midline low back pain without sciatica [M54.5]  Referring practitioner: Alexus Pires PA-C  Date of Initial Visit: 10/30/2019  Today's Date: 10/30/2019  Patient seen for 1 sessions           Subjective Questionnaire: Oswestry: 46%      Subjective Evaluation    History of Present Illness  Date of onset: 10/9/2019    Subjective comment: 68 YOM with low back for 3 weeks which started when he leaning over using a chainsaw.  He reports the pain is right in the middle of his low back.  Denies LE sympoms.  REports history of low back surgery last year by Dr. Brandon.  Referral for deep soft ttissue work and dry needling  Patient Occupation: retired Quality of life: good    Pain  Current pain rating: 3  At best pain ratin  At worst pain ratin  Location: low back-middle  Relieving factors: rest (sitting and lying down)  Exacerbated by: Bending forward and lifting.  Progression: improved    Social Support  Lives with: spouse    Treatments  No previous or current treatments  Patient Goals  Patient goals for therapy: decreased pain, increased motion, increased strength and independence with ADLs/IADLs             Objective       Postural Observations    Additional Postural Observation Details  Loss of lordosis lumbar spine    Palpation   Left   Tenderness of the erector spinae.     Additional Palpation Details  L5 TTP    Neurological Testing     Sensation     Lumbar   Left   Intact: light touch    Right   Intact: light touch    Reflexes   Left   Patellar (L4): normal (2+)  Achilles (S1): normal (2+)    Right   Patellar (L4): normal (2+)  Achilles (S1): normal (2+)    Active Range of Motion     Additional Active Range of Motion Details  Pt. Has limited lumbar flexion and extension 50% each.  Reports dull pain with extension.  Pt. Has 0 degrees of B hip IR ROM.  Pt has 0 degrees of hip  extension.     Strength/Myotome Testing     Lumbar   Left   Normal strength    Right   Normal strength    Tests     Lumbar     Left   Negative passive SLR.     Right   Negative passive SLR.     Additional Tests Details  Hamstring tightness noted         Assessment & Plan     Assessment  Impairments: abnormal muscle firing, abnormal muscle tone, abnormal or restricted ROM, activity intolerance, lacks appropriate home exercise program and pain with function  Assessment details: Pt. Is an 68 YOM with reports of LBP and stiffness.  He presents with limited ROM in his hips and his low back with in addition to increased paraspinals tone.  He reports a 46% GOLDIE score.  Recommend cont. Physical therapy services to decrease pain, improve hip ROM and spinal ROM, and improve overall function.  Barriers to therapy: none  Prognosis: good  Functional Limitations: carrying objects, lifting and pulling  Goals  Plan Goals: STG x4 weeks.  - Pt. Will be I with HEP within 4 weeks.  -Pt. Will demonstrates WNL B hip ROM all planes  -Pt. Will demonstrate full lumbar spine AROM.  LTG x8 weeks  -Pt. Will report 0% GOLDIE impairment.  -Pt. Will good mechanics with hip hinging and squat pattern.  -Pt. Will report 0/10 pain for restorative lifting.      Plan  Therapy options: will be seen for skilled physical therapy services  Planned modality interventions: electrical stimulation/Russian stimulation and thermotherapy (hydrocollator packs)  Other planned modality interventions: Dry needling  Planned therapy interventions: manual therapy, neuromuscular re-education, strengthening, stretching, therapeutic activities, postural training, flexibility, functional ROM exercises, home exercise program, joint mobilization, body mechanics training, abdominal trunk stabilization, soft tissue mobilization and spinal/joint mobilization  Frequency: 2x week  Duration in visits: 16  Treatment plan discussed with: patient        Manual Therapy:         mins   47292;  Therapeutic Exercise:         mins  67071;     Neuromuscular Summer:        mins  88272;    Therapeutic Activity:          mins  72387;     Gait Training:           mins  29606;     Ultrasound:          mins  73451;    Electrical Stimulation:         mins  40092 ( );  Dry Needling          mins self-pay    Timed Treatment:   30   mins   Total Treatment:     30   mins    PT SIGNATURE: Hao Sierra, PT   DATE TREATMENT INITIATED: 10/30/2019    Initial Certification  Certification Period: 1/28/2020  I certify that the therapy services are furnished while this patient is under my care.  The services outlined above are required by this patient, and will be reviewed every 90 days.     PHYSICIAN: Alexus Pires PA-C      DATE:     Please sign and return via fax to [phone].. Thank you, [facility].

## 2019-11-05 ENCOUNTER — TREATMENT (OUTPATIENT)
Dept: PHYSICAL THERAPY | Facility: CLINIC | Age: 69
End: 2019-11-05

## 2019-11-05 DIAGNOSIS — M54.50 ACUTE MIDLINE LOW BACK PAIN WITHOUT SCIATICA: Primary | ICD-10-CM

## 2019-11-05 PROCEDURE — 97140 MANUAL THERAPY 1/> REGIONS: CPT | Performed by: PHYSICAL THERAPIST

## 2019-11-05 PROCEDURE — 97110 THERAPEUTIC EXERCISES: CPT | Performed by: PHYSICAL THERAPIST

## 2019-11-05 NOTE — PROGRESS NOTES
Physical Therapy Daily Progress Note      Patient: Kaveh Vences   : 1950  Diagnosis/ICD-10 Code:  Acute midline low back pain without sciatica [M54.5]  Referring practitioner: Alexus Pires PA-C  Date of Initial Visit: Type: THERAPY  Noted: 10/30/2019  Today's Date: 2019  Patient seen for 2 sessions             Subjective Pt says that he still has low back pain.     Objective   See Exercise, Manual, and Modality Logs for complete treatment.       Assessment/Plan  Good tolerance with manual techniques, having min to mild TTP.  He tolerated addition of exercises well today.  After treatment, he said that he was feeling better.    Progress per Plan of Care           Timed:         Manual Therapy:    15     mins  04269;     Therapeutic Exercise:    15     mins  32744;     Neuromuscular Summer:        mins  53833;    Therapeutic Activity:          mins  53875;     Gait Training:           mins  26009;     Ultrasound:          mins  40317;    Ionto                                   mins   95581  Self Care                            mins   39723      Un-Timed:  Electrical Stimulation:         mins  05691 ( );  Dry Needling          mins self-pay  Traction          mins 06197      Timed Treatment:   30   mins   Total Treatment:     55   mins    Jason Javier PTA  Physical Therapist

## 2019-11-08 RX ORDER — AMLODIPINE BESYLATE 5 MG/1
TABLET ORAL
Qty: 90 TABLET | Refills: 0 | Status: SHIPPED | OUTPATIENT
Start: 2019-11-08 | End: 2020-03-13 | Stop reason: SDUPTHER

## 2019-11-12 ENCOUNTER — TREATMENT (OUTPATIENT)
Dept: PHYSICAL THERAPY | Facility: CLINIC | Age: 69
End: 2019-11-12

## 2019-11-12 DIAGNOSIS — M54.50 ACUTE MIDLINE LOW BACK PAIN WITHOUT SCIATICA: Primary | ICD-10-CM

## 2019-11-12 PROCEDURE — 97140 MANUAL THERAPY 1/> REGIONS: CPT | Performed by: PHYSICAL THERAPIST

## 2019-11-12 PROCEDURE — 97110 THERAPEUTIC EXERCISES: CPT | Performed by: PHYSICAL THERAPIST

## 2019-11-12 NOTE — PROGRESS NOTES
Physical Therapy Daily Progress Note    VISIT#: 3    Subjective   Kaveh Vences reports no pain at present, but comes on with bending over working or sitting too long. Pt states it's eased up a lot since last week and pt was able to bowl last night.   Pain Rating (0-10): 0    Objective     See Exercise, Manual, and Modality Logs for complete treatment. Reviewed logs for accuracy.    Patient Education: cues for therex    Assessment/Plan Pt tolerated manual today and demonstrated reduced hypertonus at lumb pvs afterwards. Pt tolerated therex well, but required cue for proper performance with most exs especially bent knee abd & spinal rotations.    Progress per Plan of Care and Progress strengthening /stabilization /functional activity            Timed:         Manual Therapy:   10      mins  58380;     Therapeutic Exercise:    20     mins  63221;     Neuromuscular Summer:        mins  26272;    Therapeutic Activity:         mins  72724;     Gait Training:           mins  04525;     Ultrasound:          mins  83446;    Ionto                                   mins   36878  Self Care                            mins   56445  Canalith Repos                   mins  03137    Un-Timed:  Electrical Stimulation:        mins  42384 ( );  Dry Needling          mins self-pay  Traction          mins 40951  Low Eval          Mins  78070  Mod Eval          Mins  66129  High Eval                            Mins  88654  Re-Eval                               mins  98890    Timed Treatment:  30    mins   Total Treatment:     30   mins    Shasha Arauz, PT  [place] License # [id number]  Physical Therapist

## 2019-11-14 ENCOUNTER — TREATMENT (OUTPATIENT)
Dept: PHYSICAL THERAPY | Facility: CLINIC | Age: 69
End: 2019-11-14

## 2019-11-14 DIAGNOSIS — M54.50 ACUTE MIDLINE LOW BACK PAIN WITHOUT SCIATICA: Primary | ICD-10-CM

## 2019-11-14 PROCEDURE — 97140 MANUAL THERAPY 1/> REGIONS: CPT | Performed by: PHYSICAL THERAPIST

## 2019-11-14 PROCEDURE — 97110 THERAPEUTIC EXERCISES: CPT | Performed by: PHYSICAL THERAPIST

## 2019-11-14 NOTE — PROGRESS NOTES
Physical Therapy Daily Progress Note    VISIT#: 4    Subjective   Kaveh Vences reports no pain at rest or with most activities. Pt is still sore with getting up after sitting too long.  Pain Rating (0-10): 0    Objective     See Exercise, Manual, and Modality Logs for complete treatment.     Patient Education: cues for therex    Assessment/Plan Pt tolerated manual and progression of exs well today.     Progress per Plan of Care and Progress strengthening /stabilization /functional activity        Timed:         Manual Therapy:    10     mins  79542;     Therapeutic Exercise:    15     mins  74474;     Neuromuscular Summer:        mins  83128;    Therapeutic Activity:          mins  30676;     Gait Training:           mins  82477;     Ultrasound:         mins  04831;    Ionto                                   mins   08507  Self Care                            mins   84096  Canalith Repos                   mins  62953    Un-Timed:  Electrical Stimulation:         mins  39143 ( );  Dry Needling          mins self-pay  Traction          mins 20224  Low Eval          Mins  40495  Mod Eval          Mins  13619  High Eval                            Mins  31468  Re-Eval                               mins  10522    Timed Treatment:  25    mins   Total Treatment:     25   mins    Shasha Arauz PT  [place] License # [id number]  Physical Therapist

## 2019-11-26 ENCOUNTER — TREATMENT (OUTPATIENT)
Dept: PHYSICAL THERAPY | Facility: CLINIC | Age: 69
End: 2019-11-26

## 2019-11-26 DIAGNOSIS — M54.50 ACUTE MIDLINE LOW BACK PAIN WITHOUT SCIATICA: Primary | ICD-10-CM

## 2019-11-26 PROCEDURE — 97110 THERAPEUTIC EXERCISES: CPT | Performed by: PHYSICAL THERAPIST

## 2019-11-26 NOTE — PROGRESS NOTES
Physical Therapy Daily Progress Note    VISIT#: 5    Subjective   Kaveh Vences reports no pain at present. Pt states pain averages 1-2/10 when sitting on a soft surface or if drives for a couple of hours, then once he stands up.   Pain Rating (0-10): 1    Objective AROM trunk flex limited 25%, ext limited 30% (dull pain), trunk rot/SBs WNL in standing with mild discomfort at end range SBs, hip ext 5 L/10 R (degrees in standing), hip abd/add/flex WFL/WNL, hip IR 20 degrees B in sitting at 90 degrees hip/knee flexion    See Exercise Logs for complete treatment.     Patient Education: cues for therex     Assessment/Plan Pt tolerated addition of seated dynadisc exs and progression of therex with increase to GTB for clams & marches fairly well. Mild discomfort with CW/CCW on dynadisc, but this was reduced with cues to limit range prn. Pt has met STG for HEP and is progressing toward full ROM hip/trunk. Held manual due to assessment and progression of exs. Continue with skilled PT.     Progress per Plan of Care and Progress strengthening /stabilization /functional activity     Timed:         Manual Therapy:         mins  42824;     Therapeutic Exercise:   53      mins  07925;     Neuromuscular Summer:        mins  50591;    Therapeutic Activity:          mins  18307;     Gait Training:           mins  02875;     Ultrasound:          mins  02734;    Ionto                                   mins   65427  Self Care                            mins   88089  Canalith Repos                  mins  54884    Un-Timed:  Electrical Stimulation:         mins  81783 ( );  Dry Needling          mins self-pay  Traction          mins 52126  Low Eval         Mins  66243  Mod Eval          Mins  27582  High Eval                            Mins  15018  Re-Eval                               mins  39983    Timed Treatment:  53    mins   Total Treatment:    53   mins    Shasha Arauz, PT  [place] License # [id number]  Physical Therapist

## 2019-12-02 ENCOUNTER — OFFICE VISIT (OUTPATIENT)
Dept: NEUROSURGERY | Facility: CLINIC | Age: 69
End: 2019-12-02

## 2019-12-02 VITALS
BODY MASS INDEX: 29.19 KG/M2 | RESPIRATION RATE: 18 BRPM | HEIGHT: 67 IN | DIASTOLIC BLOOD PRESSURE: 67 MMHG | SYSTOLIC BLOOD PRESSURE: 110 MMHG | OXYGEN SATURATION: 96 % | WEIGHT: 186 LBS | HEART RATE: 51 BPM

## 2019-12-02 DIAGNOSIS — M54.50 ACUTE BILATERAL LOW BACK PAIN WITHOUT SCIATICA: Primary | ICD-10-CM

## 2019-12-02 PROBLEM — E11.9 TYPE 2 DIABETES MELLITUS (HCC): Status: ACTIVE | Noted: 2019-12-02

## 2019-12-02 PROBLEM — E78.5 HYPERLIPIDEMIA: Status: ACTIVE | Noted: 2019-12-02

## 2019-12-02 PROBLEM — I20.9 ANGINA PECTORIS (HCC): Status: ACTIVE | Noted: 2017-01-13

## 2019-12-02 PROBLEM — I48.91 ATRIAL FIBRILLATION (HCC): Status: ACTIVE | Noted: 2019-12-02

## 2019-12-02 PROBLEM — M47.27 OSTEOARTHRITIS OF LUMBOSACRAL SPINE WITH RADICULOPATHY: Status: ACTIVE | Noted: 2018-12-06

## 2019-12-02 PROBLEM — I25.708 ATHEROSCLEROSIS OF CORONARY ARTERY BYPASS GRAFT(S), UNSPECIFIED, WITH OTHER FORMS OF ANGINA PECTORIS: Status: ACTIVE | Noted: 2018-05-16

## 2019-12-02 PROBLEM — I10 HYPERTENSION: Status: ACTIVE | Noted: 2019-12-02

## 2019-12-02 PROBLEM — I38 VALVULAR HEART DISEASE: Status: ACTIVE | Noted: 2018-03-23

## 2019-12-02 PROBLEM — I25.10 CORONARY HEART DISEASE: Status: ACTIVE | Noted: 2019-12-02

## 2019-12-02 PROBLEM — M51.26 HERNIATED LUMBAR INTERVERTEBRAL DISC: Status: ACTIVE | Noted: 2018-12-19

## 2019-12-02 PROBLEM — I25.718 ATHEROSCLEROSIS OF AUTOLOGOUS VEIN CORONARY ARTERY BYPASS GRAFT(S) WITH OTHER FORMS OF ANGINA PECTORIS (HCC): Status: ACTIVE | Noted: 2019-03-14

## 2019-12-02 PROCEDURE — 99212 OFFICE O/P EST SF 10 MIN: CPT | Performed by: PHYSICIAN ASSISTANT

## 2019-12-02 RX ORDER — IBUPROFEN 800 MG/1
800 TABLET ORAL EVERY 8 HOURS PRN
Qty: 42 TABLET | Refills: 0 | Status: SHIPPED | OUTPATIENT
Start: 2019-12-02 | End: 2019-12-16

## 2019-12-02 NOTE — PROGRESS NOTES
"Sterling Vences is a 68 y.o. male.     Chief Complaint   Patient presents with   • Follow-up     LOW BACK PAIN     Visit Vitals  /67 (BP Location: Right arm, Patient Position: Sitting, Cuff Size: Large Adult)   Pulse 51   Resp 18   Ht 170.2 cm (67\")   Wt 84.4 kg (186 lb)   SpO2 96%   BMI 29.13 kg/m²       History of Present Illness:  Mr. Kaveh Vences is a 68-year-old male who presents to the office today as a postop follow-up.  In January 2019 the patient underwent a L2-3 microdiscectomy.  We saw the patient in the office in October 2019 and the patient had developed some lower back pain below the area of the recent discectomy.  His pain did not radiate down his legs.  He states the pain is now down to about a 3 out of 10 after starting with physical therapy.  It is not debilitating.  Patient has tried meloxicam in the past and does not like the way this medication makes him feel.    The following portions of the patient's history were reviewed and updated as appropriate: allergies, current medications, past family history, past medical history, past social history, past surgical history and problem list.    Review of Systems   Constitutional: Negative for activity change, appetite change, chills, diaphoresis, fatigue and fever.   Musculoskeletal: Positive for back pain. Negative for arthralgias, gait problem, joint swelling, myalgias and neck pain.   Neurological: Negative for weakness and numbness.         Past Surgical History:   Procedure Laterality Date   • BACK SURGERY  01/2019   • CARDIAC CATHETERIZATION  2006, 2009, 2012, 2018   • CHOLECYSTECTOMY  12/22/2015   • CORONARY ANGIOPLASTY  2006, 2009   • CORONARY ARTERY BYPASS GRAFT  05/11/2012    x2   • ROTATOR CUFF REPAIR Left        Past Medical History:   Diagnosis Date   • Atrial fibrillation (CMS/HCC)    • Coronary artery disease    • Diabetes mellitus (CMS/HCC)    • Gallstones    • Hyperlipidemia    • Hypertension    • Mitral regurgitation    • " Sleep apnea    • Valvular disease        Objective   Physical Exam   Constitutional: He is oriented to person, place, and time. He appears well-developed.   HENT:   Head: Normocephalic.   Eyes: Pupils are equal, round, and reactive to light.   Neck: Normal range of motion.   Musculoskeletal: Normal range of motion.   Neurological: He is alert and oriented to person, place, and time. Gait normal.   Vitals reviewed.    Neurologic Exam     Mental Status   Oriented to person, place, and time.     Cranial Nerves     CN III, IV, VI   Pupils are equal, round, and reactive to light.    Motor Exam   Muscle bulk: normal  Overall muscle tone: normal    Sensory Exam   Light touch normal.     Gait, Coordination, and Reflexes     Gait  Gait: normal    Back Exam     Comments:  No midline tenderness    No SI tenderness    Negative hip exam                 Assessment and Plan:    At this time, Mr. Vences is doing fairly well with physical therapy alone.  His pain is down to 3 out of 10 on a regular basis.  His area of pain is several inches below the surgical incision and there is no concern for a recurrent herniated disc.  He has never had any pain down his legs.  This time he feels a little more consistent with arthritic type pains in his back.  Have offered the patient pain management injections with facet blocks.  He does not wish to undergo this at this time as the pain is not severe enough for these.  He does not want to start any meloxicam.  We discussed the use of just ibuprofen 800 mg 3 times a day for 2 weeks.  If the pain is not any better in 2 weeks and he will call the office for referral to pain management.    Patient follow-up in our office in 3 months.  If his pain is significantly better in 3 months then we do not need to see him and he can follow-up on as-needed basis      Problems Addressed this Visit        Nervous and Auditory    Low back pain - Primary

## 2019-12-05 ENCOUNTER — DOCUMENTATION (OUTPATIENT)
Dept: PHYSICAL THERAPY | Facility: CLINIC | Age: 69
End: 2019-12-05

## 2019-12-05 NOTE — PROGRESS NOTES
Discharge Summary  Discharge Summary from Physical Therapy Report      Dates  PT visit: 10/30/19 - 11/26/19  Number of Visits: 5    Discharge Status of Patient: Pt reports he's doing well with HEP and requested D/C.    Goals: Pt met STG for HEP, and was progressing well toward goal for full hip/trunk ROM at last visit seen. Pt did not return after the 5th visit so a formal reassessment was never done.     Discharge Plan: Continue with current home exercise program as instructed    Date of Discharge: 12/5/19    Shasha Arauz, PT  Physical Therapist  [place] License# [id number]

## 2019-12-17 RX ORDER — ISOSORBIDE MONONITRATE 30 MG/1
TABLET, EXTENDED RELEASE ORAL
Qty: 90 TABLET | Refills: 0 | Status: SHIPPED | OUTPATIENT
Start: 2019-12-17 | End: 2020-03-13 | Stop reason: SDUPTHER

## 2019-12-17 RX ORDER — CLOPIDOGREL BISULFATE 75 MG/1
TABLET ORAL
Qty: 90 TABLET | Refills: 0 | Status: SHIPPED | OUTPATIENT
Start: 2019-12-17 | End: 2020-01-14 | Stop reason: SDUPTHER

## 2019-12-17 RX ORDER — METOPROLOL TARTRATE 50 MG/1
TABLET, FILM COATED ORAL
Qty: 180 TABLET | Refills: 0 | Status: SHIPPED | OUTPATIENT
Start: 2019-12-17 | End: 2020-03-13 | Stop reason: SDUPTHER

## 2019-12-17 RX ORDER — LISINOPRIL 20 MG/1
TABLET ORAL
Qty: 180 TABLET | Refills: 0 | Status: SHIPPED | OUTPATIENT
Start: 2019-12-17 | End: 2020-03-13 | Stop reason: SDUPTHER

## 2020-01-14 ENCOUNTER — OFFICE VISIT (OUTPATIENT)
Dept: CARDIOLOGY | Facility: CLINIC | Age: 70
End: 2020-01-14

## 2020-01-14 ENCOUNTER — HOSPITAL ENCOUNTER (OUTPATIENT)
Dept: CARDIOLOGY | Facility: HOSPITAL | Age: 70
Discharge: HOME | End: 2020-01-14
Admitting: INTERNAL MEDICINE

## 2020-01-14 VITALS
BODY MASS INDEX: 29.66 KG/M2 | DIASTOLIC BLOOD PRESSURE: 80 MMHG | WEIGHT: 189 LBS | HEIGHT: 67 IN | SYSTOLIC BLOOD PRESSURE: 146 MMHG | HEART RATE: 52 BPM | OXYGEN SATURATION: 98 %

## 2020-01-14 DIAGNOSIS — R00.2 PALPITATIONS: Primary | ICD-10-CM

## 2020-01-14 DIAGNOSIS — R06.02 SHORTNESS OF BREATH: ICD-10-CM

## 2020-01-14 DIAGNOSIS — I10 ESSENTIAL HYPERTENSION: ICD-10-CM

## 2020-01-14 DIAGNOSIS — R00.2 PALPITATIONS: ICD-10-CM

## 2020-01-14 DIAGNOSIS — I25.10 CORONARY ARTERY DISEASE INVOLVING NATIVE CORONARY ARTERY OF NATIVE HEART WITHOUT ANGINA PECTORIS: ICD-10-CM

## 2020-01-14 DIAGNOSIS — E78.2 MIXED HYPERLIPIDEMIA: ICD-10-CM

## 2020-01-14 DIAGNOSIS — I34.0 NONRHEUMATIC MITRAL VALVE REGURGITATION: ICD-10-CM

## 2020-01-14 PROCEDURE — 93225 XTRNL ECG REC<48 HRS REC: CPT

## 2020-01-14 PROCEDURE — 99214 OFFICE O/P EST MOD 30 MIN: CPT | Performed by: INTERNAL MEDICINE

## 2020-01-14 RX ORDER — CLOPIDOGREL BISULFATE 75 MG/1
75 TABLET ORAL DAILY
Qty: 90 TABLET | Refills: 3 | Status: SHIPPED | OUTPATIENT
Start: 2020-01-14 | End: 2020-03-13 | Stop reason: SDUPTHER

## 2020-01-14 RX ORDER — NITROGLYCERIN 0.4 MG/1
0.4 TABLET SUBLINGUAL
Qty: 30 TABLET | Refills: 4 | Status: SHIPPED | OUTPATIENT
Start: 2020-01-14 | End: 2020-12-16 | Stop reason: SDUPTHER

## 2020-01-14 NOTE — PROGRESS NOTES
Subjective:     Encounter Date:01/14/2020      Patient ID: Kaveh Vences is a 69 y.o. male.    Chief Complaint:      Dear Skinny Greene,    Mr. Kaveh Vences Is a very pleasant 67 years old gentleman with history of diabetes,  coronary artery disease, status post previous CABG in 2012, and PCI stenting.     cardiac catheterization with graft angiography showed LV ejection fraction of 40%, severe native three-vessel coronary disease with 90% calcified stenosis involving the distal left main involving the ostium of the LAD significant 99% lesion of left circumflex coronary artery.  100% occlusion of the nondominant right coronary artery was noted as well.  Ashby to the LAD was patent and saphenous vein graft to marginal branch was patent as well.    Echocardiogram with LV ejection fraction of 45% with moderate mitral regurgitation    Problem list  Coronary artery disease status post coronary artery bypass surgery  Hypertension  Hyperlipidemia  Sinus bradycardia with a left bundle branch block  Symptoms of intermittent dizziness      Denies any further symptoms of chest pain shortness of breath or syncope  Complaining of intermittent dizziness that is nonspecific  Schedule patient for a Holter monitor for symptoms of dizziness palpitations and also sinus bradycardia with left bundle branch block  Good functional capacity   Plan is patient conservatively from cardiac standpoint   continue current medical therapy   patient is already on maximal medical therapy   continue close monitoring  Consider repeat echocardiogram next year to assess the severity of the mitral regurgitation   follow-up in office 6 months            Assessment:          Diagnosis Plan   1. Palpitations  Holter Monitor - 24 Hour   2. Coronary artery disease involving native coronary artery of native heart without angina pectoris     3. Essential hypertension     4. Mixed hyperlipidemia             The following portions of the patient's history were  reviewed and updated as appropriate: allergies, current medications, past family history, past medical history, past social history, past surgical history and problem list.    No Known Allergies      Current Outpatient Medications:   •  amLODIPine (NORVASC) 5 MG tablet, TAKE 1 TABLET EVERY DAY, Disp: 90 tablet, Rfl: 0  •  aspirin 81 MG EC tablet, Take 81 mg by mouth Daily., Disp: , Rfl:   •  atorvastatin (LIPITOR) 20 MG tablet, TAKE 1 TABLET EVERY DAY, Disp: 90 tablet, Rfl: 2  •  clopidogrel (PLAVIX) 75 MG tablet, Take 1 tablet by mouth Daily., Disp: 90 tablet, Rfl: 3  •  HYDROcodone-acetaminophen (NORCO) 5-325 MG per tablet, Take 1 tablet by mouth Every 6 (Six) Hours As Needed., Disp: , Rfl:   •  isosorbide mononitrate (IMDUR) 30 MG 24 hr tablet, TAKE 1 TABLET EVERY DAY, Disp: 90 tablet, Rfl: 0  •  lisinopril (PRINIVIL,ZESTRIL) 20 MG tablet, TAKE 1 TABLET TWICE DAILY, Disp: 180 tablet, Rfl: 0  •  metFORMIN (GLUCOPHAGE) 1000 MG tablet, Take 1,000 mg by mouth 2 (Two) Times a Day With Meals., Disp: , Rfl:   •  metoprolol tartrate (LOPRESSOR) 50 MG tablet, TAKE 1 TABLET TWICE DAILY, Disp: 180 tablet, Rfl: 0  •  nitroglycerin (NITROSTAT) 0.4 MG SL tablet, Place 1 tablet under the tongue Every 5 (Five) Minutes As Needed for Chest Pain. Take no more than 3 doses in 15 minutes., Disp: 30 tablet, Rfl: 4  •  ranolazine (RANEXA) 500 MG 12 hr tablet, Take 500 mg by mouth 2 (Two) Times a Day., Disp: , Rfl:   •  tiZANidine (ZANAFLEX) 2 MG tablet, Take 1 tablet by mouth Every 8 (Eight) Hours As Needed for Muscle Spasms. Start with one at night, may increased to TID as needed, Disp: 60 tablet, Rfl: 0    Family History   Problem Relation Age of Onset   • Coronary artery disease Father    • Coronary artery disease Brother    • Heart attack Brother    • Stroke Brother    • Diabetes Maternal Uncle        Past Medical History:   Diagnosis Date   • Atrial fibrillation (CMS/HCC)    • Coronary artery disease    • Diabetes mellitus  (CMS/[facility])    • Gallstones    • Hyperlipidemia    • Hypertension    • Mitral regurgitation    • Sleep apnea    • Valvular disease        Past Surgical History:   Procedure Laterality Date   • BACK SURGERY  01/2019   • CARDIAC CATHETERIZATION  2006, 2009, 2012, 2018   • CHOLECYSTECTOMY  12/22/2015   • CORONARY ANGIOPLASTY  2006, 2009   • CORONARY ARTERY BYPASS GRAFT  05/11/2012    x2   • ROTATOR CUFF REPAIR Left        Social History     Socioeconomic History   • Marital status:      Spouse name: Not on file   • Number of children: Not on file   • Years of education: Not on file   • Highest education level: Not on file   Tobacco Use   • Smoking status: Never Smoker   • Smokeless tobacco: Never Used   Substance and Sexual Activity   • Alcohol use: No   • Drug use: No   • Sexual activity: Defer       Review of Systems   Constitution: Negative for chills, decreased appetite and malaise/fatigue.   HENT: Negative for congestion.    Eyes: Negative for blurred vision and double vision.   Cardiovascular: Positive for dyspnea on exertion and palpitations. Negative for chest pain, irregular heartbeat, leg swelling, near-syncope, orthopnea, paroxysmal nocturnal dyspnea and syncope.   Respiratory: Negative for cough and shortness of breath.    Hematologic/Lymphatic: Negative for adenopathy. Does not bruise/bleed easily.   Skin: Negative for rash.   Gastrointestinal: Negative for bloating and abdominal pain.   Neurological: Positive for dizziness. Negative for focal weakness.            Objective:         Vitals:    01/14/20 0842   BP: 146/80   Pulse: 52   SpO2: 98%       Physical Exam   Constitutional: He is oriented to person, place, and time. He appears well-developed and well-nourished.   HENT:   Head: Normocephalic and atraumatic.   Eyes: Pupils are equal, round, and reactive to light. Conjunctivae are normal.   Neck: Normal range of motion. Neck supple. No thyromegaly present.   Cardiovascular: Normal rate, regular  rhythm, S1 normal, S2 normal and intact distal pulses.   Pulmonary/Chest: Effort normal and breath sounds normal.   Abdominal: Soft. Bowel sounds are normal.   Musculoskeletal: He exhibits no edema.   Neurological: He is alert and oriented to person, place, and time.   Skin: Skin is warm.   Nursing note and vitals reviewed.      Procedures                   DISPLAY PLAN FREE TEXT

## 2020-01-27 PROCEDURE — 93227 XTRNL ECG REC<48 HR R&I: CPT | Performed by: INTERNAL MEDICINE

## 2020-02-07 ENCOUNTER — TELEPHONE (OUTPATIENT)
Dept: CARDIOLOGY | Facility: CLINIC | Age: 70
End: 2020-02-07

## 2020-02-21 ENCOUNTER — HOSPITAL ENCOUNTER (EMERGENCY)
Facility: HOSPITAL | Age: 70
Discharge: HOME | End: 2020-02-21
Admitting: EMERGENCY MEDICINE

## 2020-02-21 ENCOUNTER — APPOINTMENT (OUTPATIENT)
Dept: GENERAL RADIOLOGY | Facility: HOSPITAL | Age: 70
End: 2020-02-21

## 2020-02-21 VITALS
HEIGHT: 67 IN | DIASTOLIC BLOOD PRESSURE: 78 MMHG | WEIGHT: 191.8 LBS | HEART RATE: 68 BPM | TEMPERATURE: 99.7 F | BODY MASS INDEX: 30.1 KG/M2 | RESPIRATION RATE: 19 BRPM | OXYGEN SATURATION: 94 % | SYSTOLIC BLOOD PRESSURE: 138 MMHG

## 2020-02-21 DIAGNOSIS — J10.1 INFLUENZA A: Primary | ICD-10-CM

## 2020-02-21 DIAGNOSIS — R50.9 FEVER, UNSPECIFIED FEVER CAUSE: ICD-10-CM

## 2020-02-21 LAB
ALBUMIN SERPL-MCNC: 4.3 G/DL (ref 3.5–5.2)
ALBUMIN/GLOB SERPL: 2 G/DL
ALP SERPL-CCNC: 54 U/L (ref 39–117)
ALT SERPL W P-5'-P-CCNC: 16 U/L (ref 1–41)
ANION GAP SERPL CALCULATED.3IONS-SCNC: 13 MMOL/L (ref 5–15)
AST SERPL-CCNC: 17 U/L (ref 1–40)
BASOPHILS # BLD AUTO: 0 10*3/MM3 (ref 0–0.2)
BASOPHILS NFR BLD AUTO: 0.4 % (ref 0–1.5)
BILIRUB SERPL-MCNC: 0.4 MG/DL (ref 0.2–1.2)
BUN BLD-MCNC: 15 MG/DL (ref 8–23)
BUN/CREAT SERPL: 17.2 (ref 7–25)
CALCIUM SPEC-SCNC: 9.1 MG/DL (ref 8.6–10.5)
CHLORIDE SERPL-SCNC: 97 MMOL/L (ref 98–107)
CO2 SERPL-SCNC: 25 MMOL/L (ref 22–29)
CREAT BLD-MCNC: 0.87 MG/DL (ref 0.76–1.27)
D-LACTATE SERPL-SCNC: 1.5 MMOL/L (ref 0.5–2)
DEPRECATED RDW RBC AUTO: 44.6 FL (ref 37–54)
EOSINOPHIL # BLD AUTO: 0 10*3/MM3 (ref 0–0.4)
EOSINOPHIL NFR BLD AUTO: 0.5 % (ref 0.3–6.2)
ERYTHROCYTE [DISTWIDTH] IN BLOOD BY AUTOMATED COUNT: 13.5 % (ref 12.3–15.4)
FLUAV SUBTYP SPEC NAA+PROBE: DETECTED
FLUBV RNA ISLT QL NAA+PROBE: NOT DETECTED
GFR SERPL CREATININE-BSD FRML MDRD: 87 ML/MIN/1.73
GLOBULIN UR ELPH-MCNC: 2.2 GM/DL
GLUCOSE BLD-MCNC: 219 MG/DL (ref 65–99)
HCT VFR BLD AUTO: 39.7 % (ref 37.5–51)
HGB BLD-MCNC: 13.7 G/DL (ref 13–17.7)
LYMPHOCYTES # BLD AUTO: 0.7 10*3/MM3 (ref 0.7–3.1)
LYMPHOCYTES NFR BLD AUTO: 19.4 % (ref 19.6–45.3)
MCH RBC QN AUTO: 32.8 PG (ref 26.6–33)
MCHC RBC AUTO-ENTMCNC: 34.7 G/DL (ref 31.5–35.7)
MCV RBC AUTO: 94.7 FL (ref 79–97)
MONOCYTES # BLD AUTO: 0.7 10*3/MM3 (ref 0.1–0.9)
MONOCYTES NFR BLD AUTO: 19.6 % (ref 5–12)
NEUTROPHILS # BLD AUTO: 2.3 10*3/MM3 (ref 1.7–7)
NEUTROPHILS NFR BLD AUTO: 60.1 % (ref 42.7–76)
NRBC BLD AUTO-RTO: 0.1 /100 WBC (ref 0–0.2)
PLATELET # BLD AUTO: 136 10*3/MM3 (ref 140–450)
PMV BLD AUTO: 6.7 FL (ref 6–12)
POTASSIUM BLD-SCNC: 3.9 MMOL/L (ref 3.5–5.2)
PROT SERPL-MCNC: 6.5 G/DL (ref 6–8.5)
RBC # BLD AUTO: 4.19 10*6/MM3 (ref 4.14–5.8)
SODIUM BLD-SCNC: 135 MMOL/L (ref 136–145)
WBC NRBC COR # BLD: 3.8 10*3/MM3 (ref 3.4–10.8)

## 2020-02-21 PROCEDURE — 80053 COMPREHEN METABOLIC PANEL: CPT | Performed by: NURSE PRACTITIONER

## 2020-02-21 PROCEDURE — 99283 EMERGENCY DEPT VISIT LOW MDM: CPT

## 2020-02-21 PROCEDURE — 85025 COMPLETE CBC W/AUTO DIFF WBC: CPT | Performed by: NURSE PRACTITIONER

## 2020-02-21 PROCEDURE — 71046 X-RAY EXAM CHEST 2 VIEWS: CPT

## 2020-02-21 PROCEDURE — 83605 ASSAY OF LACTIC ACID: CPT | Performed by: NURSE PRACTITIONER

## 2020-02-21 PROCEDURE — 87502 INFLUENZA DNA AMP PROBE: CPT | Performed by: NURSE PRACTITIONER

## 2020-02-21 RX ORDER — ACETAMINOPHEN 500 MG
1000 TABLET ORAL ONCE
Status: COMPLETED | OUTPATIENT
Start: 2020-02-21 | End: 2020-02-21

## 2020-02-21 RX ORDER — SODIUM CHLORIDE 0.9 % (FLUSH) 0.9 %
10 SYRINGE (ML) INJECTION AS NEEDED
Status: DISCONTINUED | OUTPATIENT
Start: 2020-02-21 | End: 2020-02-22 | Stop reason: HOSPADM

## 2020-02-21 RX ORDER — OSELTAMIVIR PHOSPHATE 75 MG/1
75 CAPSULE ORAL 2 TIMES DAILY
Qty: 10 CAPSULE | Refills: 0 | Status: SHIPPED | OUTPATIENT
Start: 2020-02-21 | End: 2020-02-26

## 2020-02-21 RX ADMIN — ACETAMINOPHEN 1000 MG: 500 TABLET, FILM COATED ORAL at 21:47

## 2020-02-21 RX ADMIN — SODIUM CHLORIDE 1000 ML: 900 INJECTION, SOLUTION INTRAVENOUS at 20:32

## 2020-02-22 NOTE — ED PROVIDER NOTES
Subjective   69-year-old  male presents to the emergency room with complaint of cough and a scratchy throat since Wednesday night.  He states that his primary care is Dr. Ernst and his cardiologist is Dr. Man.  Patient denies nausea vomiting or chest pain.  Patient denies shortness of breath  Onset: 2 nights ago  Location: Chest and cough  Duration: 2 days  Character: Repetitive cough and body aches  Aggravating/Alleviating Factors: Coughing/none  Radiation: Throughout whole body  Severity: Moderate to severe            Review of Systems   Constitutional: Positive for activity change, appetite change, chills, fatigue and fever.   HENT: Positive for congestion. Negative for trouble swallowing.    Respiratory: Positive for cough.    Cardiovascular: Negative for chest pain.   Musculoskeletal: Positive for arthralgias, back pain and myalgias. Negative for gait problem, joint swelling, neck pain and neck stiffness.   Skin: Negative.    All other systems reviewed and are negative.      Past Medical History:   Diagnosis Date   • Atrial fibrillation (CMS/HCC)    • Coronary artery disease    • Diabetes mellitus (CMS/HCC)    • Gallstones    • Hyperlipidemia    • Hypertension    • Mitral regurgitation    • Sleep apnea    • Valvular disease        No Known Allergies    Past Surgical History:   Procedure Laterality Date   • BACK SURGERY  01/2019   • CARDIAC CATHETERIZATION  2006, 2009, 2012, 2018   • CHOLECYSTECTOMY  12/22/2015   • CORONARY ANGIOPLASTY  2006, 2009   • CORONARY ARTERY BYPASS GRAFT  05/11/2012    x2   • ROTATOR CUFF REPAIR Left        Family History   Problem Relation Age of Onset   • Coronary artery disease Father    • Coronary artery disease Brother    • Heart attack Brother    • Stroke Brother    • Diabetes Maternal Uncle        Social History     Socioeconomic History   • Marital status:      Spouse name: Not on file   • Number of children: Not on file   • Years of education: Not on file   •  Highest education level: Not on file   Tobacco Use   • Smoking status: Never Smoker   • Smokeless tobacco: Never Used   Substance and Sexual Activity   • Alcohol use: No   • Drug use: No   • Sexual activity: Defer           Objective   Physical Exam   Constitutional: He is oriented to person, place, and time. He appears well-developed and well-nourished. No distress.   HENT:   Head: Normocephalic and atraumatic.   Right Ear: External ear normal.   Left Ear: External ear normal.   Mouth/Throat: Oropharynx is clear and moist.   Eyes: Pupils are equal, round, and reactive to light. Conjunctivae and EOM are normal.   Neck: Normal range of motion. Neck supple.   Cardiovascular: Normal rate and regular rhythm.   Pulmonary/Chest: Effort normal and breath sounds normal.   Musculoskeletal: Normal range of motion.   Neurological: He is alert and oriented to person, place, and time.   Skin: Skin is warm and dry. Capillary refill takes less than 2 seconds. No rash noted. He is not diaphoretic. No erythema. No pallor.   Psychiatric: He has a normal mood and affect. His behavior is normal. Judgment and thought content normal.   Nursing note and vitals reviewed.      Procedures           ED Course      Xr Chest 2 View    Result Date: 2/21/2020  No acute cardiopulmonary abnormality.  Electronically Signed By-Darryl Jauregui On:2/21/2020 9:26 PM This report was finalized on [id number] by  Darryl Jauregui, .    Labs Reviewed   INFLUENZA ANTIGEN, RAPID - Abnormal; Notable for the following components:       Result Value    Influenza A PCR Detected (*)     All other components within normal limits   COMPREHENSIVE METABOLIC PANEL - Abnormal; Notable for the following components:    Glucose 219 (*)     Sodium 135 (*)     Chloride 97 (*)     All other components within normal limits    Narrative:     GFR Normal >60  Chronic Kidney Disease <60  Kidney Failure <15     CBC WITH AUTO DIFFERENTIAL - Abnormal; Notable for the following  components:    Platelets 136 (*)     Lymphocyte % 19.4 (*)     Monocyte % 19.6 (*)     All other components within normal limits   LACTIC ACID, PLASMA - Normal   CBC AND DIFFERENTIAL    Narrative:     The following orders were created for panel order CBC & Differential.  Procedure                               Abnormality         Status                     ---------                               -----------         ------                     CBC Auto Differential[[id number]]        Abnormal            Final result                 Please view results for these tests on the individual orders.     Medications   sodium chloride 0.9 % flush 10 mL (has no administration in time range)   sodium chloride 0.9 % bolus 1,000 mL (1,000 mL Intravenous New Bag 2/21/20 2032)   acetaminophen (TYLENOL) tablet 1,000 mg (1,000 mg Oral Given 2/21/20 2147)        Peripheral IV established and 1 L normal saline bolus given.  1 g of Tylenol given for pain and low-grade fever.  Discharged home with a prescription for Tamiflu and instructed and educated patient's on the pros and cons of taking Tamiflu.  Encourage patient to drink plenty of fluids and rest and limit contacts out in public for the next week.  Encourage patient to return to ER if having chest pain feeling worse or is unable to eat or drink.                                MDM    Final diagnoses:   Influenza A   Fever, unspecified fever cause            Chana Dior, APRN  02/21/20 [id number]

## 2020-02-22 NOTE — DISCHARGE INSTRUCTIONS
REST AND DRINK PLENTY WATER! PLEASE TAKE IBUPROFEN/TYLENOL, AS DIRECTED FOR YOUR FEVER, ACHES AND PAIN.  LIMIT YOUR CONTACT IN PUBLIC PLACES.  WEAR A MASK IN PUBLIC IF ABSOLUTELY NECESSARY.

## 2020-03-02 ENCOUNTER — OFFICE VISIT (OUTPATIENT)
Dept: NEUROSURGERY | Facility: CLINIC | Age: 70
End: 2020-03-02

## 2020-03-02 VITALS
BODY MASS INDEX: 29.54 KG/M2 | WEIGHT: 188.2 LBS | DIASTOLIC BLOOD PRESSURE: 70 MMHG | HEIGHT: 67 IN | SYSTOLIC BLOOD PRESSURE: 125 MMHG | HEART RATE: 58 BPM

## 2020-03-02 DIAGNOSIS — M54.50 ACUTE BILATERAL LOW BACK PAIN WITHOUT SCIATICA: Primary | ICD-10-CM

## 2020-03-02 PROCEDURE — 99213 OFFICE O/P EST LOW 20 MIN: CPT | Performed by: NEUROLOGICAL SURGERY

## 2020-03-02 NOTE — PROGRESS NOTES
"Subjective   Kaveh Vences is a 69 y.o. male.     Chief Complaint   Patient presents with   • Follow-up     3 mon, s/p L2-L3 Micro 1/2019     Visit Vitals  Ht 170.2 cm (67\")   BMI 30.04 kg/m²       History of Present Illness: Mr. Vences is here today for follow-up.  He had undergone a prior decompression for which he states all of his leg pain has resolved.  He still describing pain just in his lower lumbar spine.  Is aggravated with excessive motion and twisting.  He denies any radicular pain or weakness.  He is taking intermittent ibuprofen which she states does help some but he is afraid to take this secondary to his cardiac history.  He states the pain is not really worsened but it is irritating.    The following portions of the patient's history were reviewed and updated as appropriate: allergies, current medications, past family history, past medical history, past social history, past surgical history and problem list.    Review of Systems         Past Surgical History:   Procedure Laterality Date   • BACK SURGERY  01/2019   • CARDIAC CATHETERIZATION  2006, 2009, 2012, 2018   • CHOLECYSTECTOMY  12/22/2015   • CORONARY ANGIOPLASTY  2006, 2009   • CORONARY ARTERY BYPASS GRAFT  05/11/2012    x2   • ROTATOR CUFF REPAIR Left        Past Medical History:   Diagnosis Date   • Atrial fibrillation (CMS/HCC)    • Coronary artery disease    • Diabetes mellitus (CMS/HCC)    • Gallstones    • Hyperlipidemia    • Hypertension    • Mitral regurgitation    • Sleep apnea    • Valvular disease      Social History     Socioeconomic History   • Marital status:      Spouse name: Not on file   • Number of children: Not on file   • Years of education: Not on file   • Highest education level: Not on file   Tobacco Use   • Smoking status: Never Smoker   • Smokeless tobacco: Never Used   Substance and Sexual Activity   • Alcohol use: No   • Drug use: No   • Sexual activity: Defer      Family History   Problem Relation Age of Onset "   • Coronary artery disease Father    • Coronary artery disease Brother    • Heart attack Brother    • Stroke Brother    • Diabetes Maternal Uncle           Objective   Physical Exam  Neurologic Exam  Ortho Exam    No focal motor deficits  Nonantalgic gait  Nontender to palpation of the lower lumbar spine  Full lumbar range of motion with slight exacerbation tenderness and left lateral extension      Flexion-extension films demonstrate spondylosis but no significant abnormal motion.    Assessment and Plan: At this time I feel Mr. Vences suffers from facet medial low back pain.  He request that we do not start him on anti-inflammatories and I am okay with that for right now.  At this time we will refer him to interventional pain management for dedicated facet blocks and possible rhizotomies.  I will see him back in a 3-month PRN follow-up.      Problems Addressed this Visit     None

## 2020-03-13 RX ORDER — ISOSORBIDE MONONITRATE 30 MG/1
30 TABLET, EXTENDED RELEASE ORAL DAILY
Qty: 90 TABLET | Refills: 3 | Status: SHIPPED | OUTPATIENT
Start: 2020-03-13 | End: 2021-03-24 | Stop reason: SDUPTHER

## 2020-03-13 RX ORDER — ATORVASTATIN CALCIUM 20 MG/1
20 TABLET, FILM COATED ORAL DAILY
Qty: 90 TABLET | Refills: 3 | Status: SHIPPED | OUTPATIENT
Start: 2020-03-13 | End: 2021-03-24 | Stop reason: SDUPTHER

## 2020-03-13 RX ORDER — AMLODIPINE BESYLATE 5 MG/1
5 TABLET ORAL DAILY
Qty: 90 TABLET | Refills: 3 | Status: SHIPPED | OUTPATIENT
Start: 2020-03-13 | End: 2021-03-19

## 2020-03-13 RX ORDER — CLOPIDOGREL BISULFATE 75 MG/1
75 TABLET ORAL DAILY
Qty: 90 TABLET | Refills: 3 | Status: SHIPPED | OUTPATIENT
Start: 2020-03-13 | End: 2021-03-24 | Stop reason: SDUPTHER

## 2020-03-13 RX ORDER — RANOLAZINE 500 MG/1
500 TABLET, EXTENDED RELEASE ORAL 2 TIMES DAILY
Qty: 180 TABLET | Refills: 3 | Status: SHIPPED | OUTPATIENT
Start: 2020-03-13 | End: 2020-12-16

## 2020-03-13 RX ORDER — LISINOPRIL 20 MG/1
20 TABLET ORAL 2 TIMES DAILY
Qty: 180 TABLET | Refills: 3 | Status: SHIPPED | OUTPATIENT
Start: 2020-03-13 | End: 2021-03-24 | Stop reason: SDUPTHER

## 2020-03-13 RX ORDER — METOPROLOL TARTRATE 50 MG/1
50 TABLET, FILM COATED ORAL 2 TIMES DAILY
Qty: 180 TABLET | Refills: 3 | Status: SHIPPED | OUTPATIENT
Start: 2020-03-13 | End: 2021-03-24 | Stop reason: SDUPTHER

## 2020-03-20 ENCOUNTER — OFFICE VISIT (OUTPATIENT)
Dept: PAIN MEDICINE | Facility: CLINIC | Age: 70
End: 2020-03-20

## 2020-03-20 VITALS
DIASTOLIC BLOOD PRESSURE: 78 MMHG | RESPIRATION RATE: 16 BRPM | BODY MASS INDEX: 29.03 KG/M2 | OXYGEN SATURATION: 96 % | HEIGHT: 67 IN | SYSTOLIC BLOOD PRESSURE: 137 MMHG | TEMPERATURE: 97.9 F | WEIGHT: 185 LBS | HEART RATE: 56 BPM

## 2020-03-20 DIAGNOSIS — M47.817 LUMBOSACRAL SPONDYLOSIS WITHOUT MYELOPATHY: ICD-10-CM

## 2020-03-20 DIAGNOSIS — G89.4 CHRONIC PAIN SYNDROME: Primary | ICD-10-CM

## 2020-03-20 DIAGNOSIS — M96.1 POSTLAMINECTOMY SYNDROME OF LUMBAR REGION: ICD-10-CM

## 2020-03-20 DIAGNOSIS — M25.50 POLYARTHRALGIA: ICD-10-CM

## 2020-03-20 PROCEDURE — 99204 OFFICE O/P NEW MOD 45 MIN: CPT | Performed by: ANESTHESIOLOGY

## 2020-03-20 PROCEDURE — G0463 HOSPITAL OUTPT CLINIC VISIT: HCPCS | Performed by: ANESTHESIOLOGY

## 2020-03-20 NOTE — PROGRESS NOTES
Subjective    CC back pain  Kaveh Vences is a 69 y.o. male with chronic back pain history of decompression/L2-3 microdiscectomy in 2019/Dr. Brandon, here for initial relation.  Referred by neurosurgery/Dr. Brandon.  Complains of several months of worsening and continued axial back pain radiating to bilateral hip without radicular pain worse with standing walking or prolonged activity.  Denies weakness, saddle anesthesia bladder bowel continence.  Tried physical therapy and medication with marginal relief.  Interfering with ADL and sleep.   Seen neurosurgery, no intervention recommended at this time, referred to try injections.    L-spine x-ray 2020 Good range of motion with flexion and extension.  No evidence of subluxation. Persistent degenerative disc space narrowing at the L3/4 and L5/[id number], unchanged from prior study.   L-spine MRI 2018 degenerative disc disease and mild facet degenerative change. Findings are most pronounced at L2-L3 were is moderate to severe neural foramen narrowing on the left due to the large focal disc extrusion.  mild neural foramen narrowing on the right at the L2-L3 level due to a smaller focal extrusion    Pain Assessment   Location of Pain: Lower Back  Description of Pain: Dull/Aching, Throbbing, Stabbing  Previous Pain Rating :4  Current Pain Ratin  Aggravating Factors: Activity  Alleviating Factors: Rest, Medication    PEG Assessment   What number best describes your pain on average in the past week?4  What number best describes how, during the past week, pain has interfered with your enjoyment of life?5  What number best describes how, during the past week, pain has interfered with your general activity?6      The following portions of the patient's history were reviewed and updated as appropriate: allergies, current medications, past family history, past medical history, past social history, past surgical history and problem list.     has a past medical history of  Atrial fibrillation (CMS/HCC), Coronary artery disease, Diabetes mellitus (CMS/HCC), Gallstones, Hyperlipidemia, Hypertension, Mitral regurgitation, Rheumatoid arthritis (CMS/HCC), Sleep apnea, and Valvular disease.   has a past surgical history that includes Coronary artery bypass graft (05/11/2012); Coronary angioplasty (2006, 2009); Rotator cuff repair (Left); Cholecystectomy (12/22/2015); Cardiac catheterization (2006, 2009, 2012, 2018); and Back surgery (01/2019).  family history includes Coronary artery disease in his brother and father; Diabetes in his maternal uncle; Heart attack in his brother; Stroke in his brother.  Social History     Tobacco Use   • Smoking status: Never Smoker   • Smokeless tobacco: Never Used   Substance Use Topics   • Alcohol use: No       Review of Systems   Constitutional: Negative for chills, fatigue and fever.   HENT: Negative for swollen glands and trouble swallowing.    Respiratory: Negative.  Negative for shortness of breath.    Cardiovascular: Negative for chest pain, palpitations and leg swelling.   Gastrointestinal: Negative for nausea and vomiting.   Musculoskeletal: Positive for arthralgias and back pain. Negative for gait problem, joint swelling, myalgias, neck pain and neck stiffness.   Skin: Negative for color change, dry skin, rash and skin lesions.   Neurological: Negative for dizziness, weakness, light-headedness and headache.   Hematological: Negative for adenopathy. Does not bruise/bleed easily.   Psychiatric/Behavioral: Negative for behavioral problems, suicidal ideas and depressed mood.   All other systems reviewed and are negative.      Objective   Physical Exam   Constitutional: He is oriented to person, place, and time. He appears well-developed and well-nourished. No distress.   Eyes: Pupils are equal, round, and reactive to light. Conjunctivae are normal.   Neck: Normal range of motion and full passive range of motion without pain. No Kernig's sign noted.  "  Cardiovascular: Normal heart sounds and intact distal pulses.   Pulmonary/Chest: Effort normal and breath sounds normal.   Musculoskeletal:        Lumbar back: He exhibits decreased range of motion, tenderness, pain and spasm.   Back: Paraspinal tenderness, negative leg raise.  Pain with extension/side rotation.   Lumbar loading positive, pain on extension of low back past 5 degrees.  TTP on the lumbar facets noted.     Vascular Status -  His right foot exhibits no edema. His left foot exhibits no edema.  Lymphadenopathy:     He has no cervical adenopathy.   Neurological: He is alert and oriented to person, place, and time. He has normal strength and normal reflexes. No sensory deficit. Coordination and gait normal.   Reflex Scores:       Bicep reflexes are 2+ on the right side and 2+ on the left side.       Brachioradialis reflexes are 2+ on the right side and 2+ on the left side.       Patellar reflexes are 2+ on the right side and 2+ on the left side.       Achilles reflexes are 2+ on the right side and 2+ on the left side.  Skin: Skin is warm and dry. Capillary refill takes less than 2 seconds.   Psychiatric: He has a normal mood and affect. His behavior is normal.   Vitals reviewed.    /78   Pulse 56   Temp 97.9 °F (36.6 °C)   Resp 16   Ht 170.2 cm (67\")   Wt 83.9 kg (185 lb)   SpO2 96%   BMI 28.98 kg/m²      PHQ 9 on chart  Opioid risk tool low risk    Assessment/Plan   Kaveh was seen today for back pain and initial evaluation.    Diagnoses and all orders for this visit:    Chronic pain syndrome    Postlaminectomy syndrome of lumbar region    Lumbosacral spondylosis without myelopathy  -     Ambulatory Referral to Pain Management    Polyarthralgia    Summary  Kaveh Vences is a 69 y.o. male with chronic back pain history of decompression/L2-3 microdiscectomy in January 2019/Dr. Brandon, here for initial relation.  Referred by neurosurgery/Dr. Brandon.  Worsening axial back pain from DDD spondylosis.  " Chronic back pain from postlaminectomy syndrome and chronic polyarthralgia.  Marginal relief with medication.  Pain interferes with ADLs.  Seen neurosurgery, no surgery recommended at this time referred to try injections.    We will schedule for bilateral lumbar medial branch block.  Needs to be off Plavix 7 days prior to procedure.  Will obtain permission from prescriber.  If effective will plan RFA.    RTC for procedure

## 2020-04-07 ENCOUNTER — OFFICE VISIT (OUTPATIENT)
Dept: CARDIOLOGY | Facility: CLINIC | Age: 70
End: 2020-04-07

## 2020-04-07 VITALS
DIASTOLIC BLOOD PRESSURE: 69 MMHG | SYSTOLIC BLOOD PRESSURE: 126 MMHG | HEIGHT: 67 IN | WEIGHT: 185 LBS | HEART RATE: 57 BPM | BODY MASS INDEX: 29.03 KG/M2

## 2020-04-07 DIAGNOSIS — I34.0 NONRHEUMATIC MITRAL VALVE REGURGITATION: ICD-10-CM

## 2020-04-07 DIAGNOSIS — I25.118 CORONARY ARTERY DISEASE OF NATIVE ARTERY OF NATIVE HEART WITH STABLE ANGINA PECTORIS (HCC): ICD-10-CM

## 2020-04-07 DIAGNOSIS — I10 ESSENTIAL HYPERTENSION: Primary | ICD-10-CM

## 2020-04-07 DIAGNOSIS — I44.7 LEFT BUNDLE BRANCH BLOCK: ICD-10-CM

## 2020-04-07 DIAGNOSIS — E78.2 MIXED HYPERLIPIDEMIA: ICD-10-CM

## 2020-04-07 PROCEDURE — 99212 OFFICE O/P EST SF 10 MIN: CPT | Performed by: INTERNAL MEDICINE

## 2020-04-07 NOTE — PROGRESS NOTES
Cardiology Office Visit      Encounter Date:  04/07/2020    Patient ID:   Kaveh Vences is a 69 y.o. male.    Reason For Followup:  Coronary artery disease  Hypertension  Hyperlipidemia  Telephone visit    Brief Clinical History:  Dear Skinny Harper MD    I had the pleasure of seeing Kaveh Vences today. As you are well aware, this is a 69 y.o. male with history of diabetes,  coronary artery disease, status post previous CABG in 2012, and PCI stenting.     cardiac catheterization with graft angiography showed LV ejection fraction of 40%, severe native three-vessel coronary disease with 90% calcified stenosis involving the distal left main involving the ostium of the LAD significant 99% lesion of left circumflex coronary artery.  100% occlusion of the nondominant right coronary artery was noted as well.  [place] to the LAD was patent and saphenous vein graft to marginal branch was patent as well. Echocardiogram with LV ejection fraction of 45% with moderate mitral regurgitation      Interval History:  Denies any new complaints  Occasional chest discomfort with the maximal activity  Unchanged from before  No unstable symptoms  Occasional palpitations unchanged from before    Assessment & Plan    Impressions:  Coronary artery disease status post coronary artery bypass surgery  Hypertension  Hyperlipidemia  Sinus bradycardia with a left bundle branch block  Symptoms of intermittent chest discomfort and palpitations unchanged from before    Recommendations:    Good functional capacity  Plan treat patient conservatively from cardiac standpoint  Continue current medical therapy  Patient is already on maximal medical therapy  Continue close monitoring  Repeat echocardiogram next year to assess the severity of the mitral regurgitation  Labs from recent ER visit discussed with the patient  Follow-up in office 3 months    Objective:    Vitals:  Vitals:    04/07/20 1313   BP: 126/69   BP Location: Left arm   Pulse: 57   Weight:  "83.9 kg (185 lb)   Height: 170.2 cm (67\")       Physical Exam:    General: Alert, cooperative, no distress, appears stated age  Head:  Normocephalic, atraumatic, mucous membranes moist  Eyes:  Conjunctiva/corneas clear, EOM's intact     Neck:  Supple,  no adenopathy;      Lungs: Clear to auscultation bilaterally, no wheezes rhonchi rales are noted  Chest wall: No tenderness  Heart::  Regular rate and rhythm, S1 and S2 normal, no murmur, rub or gallop  Abdomen: Soft, non-tender, nondistended bowel sounds active  Extremities: No cyanosis, clubbing, or edema  Pulses: 2+ and symmetric all extremities  Skin:  No rashes or lesions  Neuro/psych: A&O x3. CN II through XII are grossly intact with appropriate affect      Allergies:  No Known Allergies    Medication Review:     Current Outpatient Medications:   •  amLODIPine (NORVASC) 5 MG tablet, Take 1 tablet by mouth Daily., Disp: 90 tablet, Rfl: 3  •  aspirin 81 MG EC tablet, Take 81 mg by mouth Daily., Disp: , Rfl:   •  atorvastatin (LIPITOR) 20 MG tablet, Take 1 tablet by mouth Daily., Disp: 90 tablet, Rfl: 3  •  clopidogrel (PLAVIX) 75 MG tablet, Take 1 tablet by mouth Daily., Disp: 90 tablet, Rfl: 3  •  isosorbide mononitrate (IMDUR) 30 MG 24 hr tablet, Take 1 tablet by mouth Daily., Disp: 90 tablet, Rfl: 3  •  lisinopril (PRINIVIL,ZESTRIL) 20 MG tablet, Take 1 tablet by mouth 2 (Two) Times a Day., Disp: 180 tablet, Rfl: 3  •  metFORMIN (GLUCOPHAGE) 1000 MG tablet, Take 1,000 mg by mouth 2 (Two) Times a Day With Meals., Disp: , Rfl:   •  metoprolol tartrate (LOPRESSOR) 50 MG tablet, Take 1 tablet by mouth 2 (Two) Times a Day., Disp: 180 tablet, Rfl: 3  •  nitroglycerin (NITROSTAT) 0.4 MG SL tablet, Place 1 tablet under the tongue Every 5 (Five) Minutes As Needed for Chest Pain. Take no more than 3 doses in 15 minutes., Disp: 30 tablet, Rfl: 4  •  ranolazine (RANEXA) 500 MG 12 hr tablet, Take 1 tablet by mouth 2 (Two) Times a Day., Disp: 180 tablet, Rfl: 3    Family " History:  Family History   Problem Relation Age of Onset   • Coronary artery disease Father    • Coronary artery disease Brother    • Heart attack Brother    • Stroke Brother    • Diabetes Maternal Uncle        Past Medical History:  Past Medical History:   Diagnosis Date   • Atrial fibrillation (CMS/HCC)    • Coronary artery disease    • Diabetes mellitus (CMS/HCC)    • Gallstones    • Hyperlipidemia    • Hypertension    • Mitral regurgitation    • Rheumatoid arthritis (CMS/HCC)    • Sleep apnea    • Valvular disease        Past surgical History:  Past Surgical History:   Procedure Laterality Date   • BACK SURGERY  01/2019   • CARDIAC CATHETERIZATION  2006, 2009, 2012, 2018   • CHOLECYSTECTOMY  12/22/2015   • CORONARY ANGIOPLASTY  2006, 2009   • CORONARY ARTERY BYPASS GRAFT  05/11/2012    x2   • ROTATOR CUFF REPAIR Left        Social History:  Social History     Socioeconomic History   • Marital status:      Spouse name: Not on file   • Number of children: Not on file   • Years of education: Not on file   • Highest education level: Not on file   Tobacco Use   • Smoking status: Never Smoker   • Smokeless tobacco: Never Used   Substance and Sexual Activity   • Alcohol use: No   • Drug use: No   • Sexual activity: Defer       Review of Systems:  The following systems were reviewed as they relate to the cardiovascular system: Constitutional, Eyes, ENT, Cardiovascular, Respiratory, Gastrointestinal, Integumentary, Neurological, Psychiatric, Hematologic, Endocrine, Musculoskeletal, and Genitourinary. The pertinent cardiovascular findings are reported above with all other cardiovascular points within those systems being negative.    Diagnostic Study Review:     Current Electrocardiogram:  Procedures      NOTE: The following portions of the patient's history were reviewed and updated this visit as appropriate: allergies, current medications, past family history, past medical history, past social history, past  surgical history and problem list.

## 2020-04-17 ENCOUNTER — APPOINTMENT (OUTPATIENT)
Dept: PAIN MEDICINE | Facility: HOSPITAL | Age: 70
End: 2020-04-17

## 2020-05-26 ENCOUNTER — HOSPITAL ENCOUNTER (OUTPATIENT)
Dept: CARDIOLOGY | Facility: HOSPITAL | Age: 70
Discharge: HOME | End: 2020-05-26
Admitting: INTERNAL MEDICINE

## 2020-05-26 VITALS
BODY MASS INDEX: 29.03 KG/M2 | DIASTOLIC BLOOD PRESSURE: 73 MMHG | HEIGHT: 67 IN | WEIGHT: 185 LBS | RESPIRATION RATE: 20 BRPM | SYSTOLIC BLOOD PRESSURE: 132 MMHG | HEART RATE: 55 BPM

## 2020-05-26 DIAGNOSIS — I34.0 NONRHEUMATIC MITRAL VALVE REGURGITATION: ICD-10-CM

## 2020-05-26 DIAGNOSIS — R06.02 SHORTNESS OF BREATH: ICD-10-CM

## 2020-05-26 LAB
BH CV ECHO MEAS - ACS: 2.4 CM
BH CV ECHO MEAS - AI DEC SLOPE: 146.7 CM/SEC^2
BH CV ECHO MEAS - AI DEC TIME: 3.3 SEC
BH CV ECHO MEAS - AI MAX PG: 92.3 MMHG
BH CV ECHO MEAS - AI MAX VEL: 480.3 CM/SEC
BH CV ECHO MEAS - AI P1/2T: 959 MSEC
BH CV ECHO MEAS - AO MAX PG (FULL): 3.1 MMHG
BH CV ECHO MEAS - AO MAX PG: 6.4 MMHG
BH CV ECHO MEAS - AO MEAN PG (FULL): 1.6 MMHG
BH CV ECHO MEAS - AO MEAN PG: 3.3 MMHG
BH CV ECHO MEAS - AO ROOT AREA (BSA CORRECTED): 1.8
BH CV ECHO MEAS - AO ROOT AREA: 9.6 CM^2
BH CV ECHO MEAS - AO ROOT DIAM: 3.5 CM
BH CV ECHO MEAS - AO V2 MAX: 126.1 CM/SEC
BH CV ECHO MEAS - AO V2 MEAN: 86.2 CM/SEC
BH CV ECHO MEAS - AO V2 VTI: 28.5 CM
BH CV ECHO MEAS - ASC AORTA: 3.5 CM
BH CV ECHO MEAS - AVA(I,A): 3 CM^2
BH CV ECHO MEAS - AVA(I,D): 3 CM^2
BH CV ECHO MEAS - AVA(V,A): 3.1 CM^2
BH CV ECHO MEAS - AVA(V,D): 3.1 CM^2
BH CV ECHO MEAS - BSA(HAYCOCK): 2 M^2
BH CV ECHO MEAS - BSA: 2 M^2
BH CV ECHO MEAS - BZI_BMI: 29 KILOGRAMS/M^2
BH CV ECHO MEAS - BZI_METRIC_HEIGHT: 170.2 CM
BH CV ECHO MEAS - BZI_METRIC_WEIGHT: 83.9 KG
BH CV ECHO MEAS - CI(CUBED): 2.6 L/MIN/M^2
BH CV ECHO MEAS - CI(MOD-SP2): 1.7 L/MIN/M^2
BH CV ECHO MEAS - CI(MOD-SP4): 1.3 L/MIN/M^2
BH CV ECHO MEAS - CI(TEICH): 1.9 L/MIN/M^2
BH CV ECHO MEAS - CO(CUBED): 5 L/MIN
BH CV ECHO MEAS - CO(MOD-SP2): 3.4 L/MIN
BH CV ECHO MEAS - CO(MOD-SP4): 2.6 L/MIN
BH CV ECHO MEAS - CO(TEICH): 3.7 L/MIN
BH CV ECHO MEAS - EDV(CUBED): 171.3 ML
BH CV ECHO MEAS - EDV(MOD-SP2): 127.2 ML
BH CV ECHO MEAS - EDV(MOD-SP4): 96.2 ML
BH CV ECHO MEAS - EDV(TEICH): 150.8 ML
BH CV ECHO MEAS - EF(CUBED): 54.6 %
BH CV ECHO MEAS - EF(MOD-BP): 50 %
BH CV ECHO MEAS - EF(MOD-SP2): 49.7 %
BH CV ECHO MEAS - EF(MOD-SP4): 49.7 %
BH CV ECHO MEAS - EF(TEICH): 45.8 %
BH CV ECHO MEAS - ESV(CUBED): 77.8 ML
BH CV ECHO MEAS - ESV(MOD-SP2): 64 ML
BH CV ECHO MEAS - ESV(MOD-SP4): 48.4 ML
BH CV ECHO MEAS - ESV(TEICH): 81.6 ML
BH CV ECHO MEAS - FS: 23.1 %
BH CV ECHO MEAS - IVS/LVPW: 1.2
BH CV ECHO MEAS - IVSD: 1.6 CM
BH CV ECHO MEAS - LA DIMENSION(2D): 4.3 CM
BH CV ECHO MEAS - LV DIASTOLIC VOL/BSA (35-75): 49.2 ML/M^2
BH CV ECHO MEAS - LV MASS(C)D: 368.5 GRAMS
BH CV ECHO MEAS - LV MASS(C)DI: 188.4 GRAMS/M^2
BH CV ECHO MEAS - LV MAX PG: 3.3 MMHG
BH CV ECHO MEAS - LV MEAN PG: 1.7 MMHG
BH CV ECHO MEAS - LV SYSTOLIC VOL/BSA (12-30): 24.7 ML/M^2
BH CV ECHO MEAS - LV V1 MAX: 90.7 CM/SEC
BH CV ECHO MEAS - LV V1 MEAN: 59.7 CM/SEC
BH CV ECHO MEAS - LV V1 VTI: 20.1 CM
BH CV ECHO MEAS - LVIDD: 5.6 CM
BH CV ECHO MEAS - LVIDS: 4.3 CM
BH CV ECHO MEAS - LVOT AREA: 4.3 CM^2
BH CV ECHO MEAS - LVOT DIAM: 2.3 CM
BH CV ECHO MEAS - LVPWD: 1.3 CM
BH CV ECHO MEAS - MM HR: 54 BPM
BH CV ECHO MEAS - MM R-R INT: 1.1 SEC
BH CV ECHO MEAS - MR MAX PG: 123.1 MMHG
BH CV ECHO MEAS - MR MAX VEL: 554.7 CM/SEC
BH CV ECHO MEAS - MR MEAN PG: 73.6 MMHG
BH CV ECHO MEAS - MR MEAN VEL: 393.3 CM/SEC
BH CV ECHO MEAS - MR VTI: 240.1 CM
BH CV ECHO MEAS - MV A MAX VEL: 82.9 CM/SEC
BH CV ECHO MEAS - MV DEC SLOPE: 261.5 CM/SEC^2
BH CV ECHO MEAS - MV DEC TIME: 0.27 SEC
BH CV ECHO MEAS - MV E MAX VEL: 69.3 CM/SEC
BH CV ECHO MEAS - MV E/A: 0.84
BH CV ECHO MEAS - MV MAX PG: 2.8 MMHG
BH CV ECHO MEAS - MV MEAN PG: 1.5 MMHG
BH CV ECHO MEAS - MV V2 MAX: 83.5 CM/SEC
BH CV ECHO MEAS - MV V2 MEAN: 58.5 CM/SEC
BH CV ECHO MEAS - MV V2 VTI: 30 CM
BH CV ECHO MEAS - MVA(VTI): 2.9 CM^2
BH CV ECHO MEAS - PA ACC TIME: 0.12 SEC
BH CV ECHO MEAS - PA MAX PG (FULL): 1.7 MMHG
BH CV ECHO MEAS - PA MAX PG: 3.4 MMHG
BH CV ECHO MEAS - PA MEAN PG (FULL): 0.82 MMHG
BH CV ECHO MEAS - PA MEAN PG: 1.6 MMHG
BH CV ECHO MEAS - PA PR(ACCEL): 27.2 MMHG
BH CV ECHO MEAS - PA V2 MAX: 91.9 CM/SEC
BH CV ECHO MEAS - PA V2 MEAN: 58.8 CM/SEC
BH CV ECHO MEAS - PA V2 VTI: 21.9 CM
BH CV ECHO MEAS - PI END-D VEL: 142.2 CM/SEC
BH CV ECHO MEAS - PI MAX PG: 18.3 MMHG
BH CV ECHO MEAS - PI MAX VEL: 214.1 CM/SEC
BH CV ECHO MEAS - PULM A REVS DUR: 0.17 SEC
BH CV ECHO MEAS - PULM A REVS VEL: 32.4 CM/SEC
BH CV ECHO MEAS - PULM DIAS VEL: 39.6 CM/SEC
BH CV ECHO MEAS - PULM S/D: 1.3
BH CV ECHO MEAS - PULM SYS VEL: 49.5 CM/SEC
BH CV ECHO MEAS - PVA(I,A): 5.2 CM^2
BH CV ECHO MEAS - PVA(I,D): 5.2 CM^2
BH CV ECHO MEAS - PVA(V,A): 5.6 CM^2
BH CV ECHO MEAS - PVA(V,D): 5.6 CM^2
BH CV ECHO MEAS - QP/QS: 1.3
BH CV ECHO MEAS - RAP SYSTOLE: 3 MMHG
BH CV ECHO MEAS - RV MAX PG: 1.7 MMHG
BH CV ECHO MEAS - RV MEAN PG: 0.75 MMHG
BH CV ECHO MEAS - RV V1 MAX: 64.2 CM/SEC
BH CV ECHO MEAS - RV V1 MEAN: 40.1 CM/SEC
BH CV ECHO MEAS - RV V1 VTI: 14.2 CM
BH CV ECHO MEAS - RVAW: 4.1 CM
BH CV ECHO MEAS - RVOT AREA: 7.9 CM^2
BH CV ECHO MEAS - RVOT DIAM: 3.2 CM
BH CV ECHO MEAS - RVSP: 29.6 MMHG
BH CV ECHO MEAS - SI(AO): 140 ML/M^2
BH CV ECHO MEAS - SI(CUBED): 47.8 ML/M^2
BH CV ECHO MEAS - SI(LVOT): 44.4 ML/M^2
BH CV ECHO MEAS - SI(MOD-SP2): 32.3 ML/M^2
BH CV ECHO MEAS - SI(MOD-SP4): 24.4 ML/M^2
BH CV ECHO MEAS - SI(TEICH): 35.3 ML/M^2
BH CV ECHO MEAS - SV(AO): 273.8 ML
BH CV ECHO MEAS - SV(CUBED): 93.5 ML
BH CV ECHO MEAS - SV(LVOT): 86.8 ML
BH CV ECHO MEAS - SV(MOD-SP2): 63.3 ML
BH CV ECHO MEAS - SV(MOD-SP4): 47.8 ML
BH CV ECHO MEAS - SV(RVOT): 112.9 ML
BH CV ECHO MEAS - SV(TEICH): 69.1 ML
BH CV ECHO MEAS - TR MAX VEL: 257.8 CM/SEC
LV EF 2D ECHO EST: 55 %

## 2020-05-26 PROCEDURE — 93306 TTE W/DOPPLER COMPLETE: CPT | Performed by: INTERNAL MEDICINE

## 2020-05-26 PROCEDURE — 93306 TTE W/DOPPLER COMPLETE: CPT

## 2020-06-01 ENCOUNTER — OFFICE VISIT (OUTPATIENT)
Dept: CARDIOLOGY | Facility: CLINIC | Age: 70
End: 2020-06-01

## 2020-06-01 VITALS
SYSTOLIC BLOOD PRESSURE: 124 MMHG | BODY MASS INDEX: 29.13 KG/M2 | DIASTOLIC BLOOD PRESSURE: 71 MMHG | WEIGHT: 186 LBS | HEART RATE: 51 BPM | OXYGEN SATURATION: 82 %

## 2020-06-01 DIAGNOSIS — I25.118 CORONARY ARTERY DISEASE OF NATIVE ARTERY OF NATIVE HEART WITH STABLE ANGINA PECTORIS (HCC): ICD-10-CM

## 2020-06-01 DIAGNOSIS — I27.20 PULMONARY HYPERTENSION (HCC): ICD-10-CM

## 2020-06-01 DIAGNOSIS — I38 VALVULAR HEART DISEASE: ICD-10-CM

## 2020-06-01 DIAGNOSIS — I42.0 DILATED CARDIOMYOPATHY (HCC): ICD-10-CM

## 2020-06-01 DIAGNOSIS — I25.718 ATHEROSCLEROSIS OF AUTOLOGOUS VEIN CORONARY ARTERY BYPASS GRAFT(S) WITH OTHER FORMS OF ANGINA PECTORIS (HCC): Primary | ICD-10-CM

## 2020-06-01 DIAGNOSIS — I34.0 NONRHEUMATIC MITRAL VALVE REGURGITATION: ICD-10-CM

## 2020-06-01 PROCEDURE — 99214 OFFICE O/P EST MOD 30 MIN: CPT | Performed by: INTERNAL MEDICINE

## 2020-06-01 NOTE — PROGRESS NOTES
Cardiology Office Visit      Encounter Date:  06/01/2020    Patient ID:   Kaveh Vences is a 69 y.o. male.    Reason For Followup:  Coronary artery disease  Hypertension  Hyperlipidemia  Telephone visit    Brief Clinical History:  Dear Skinny Harper MD    I had the pleasure of seeing Kaveh Vences today. As you are well aware, this is a 69 y.o. male with history of diabetes,  coronary artery disease, status post previous CABG in 2012, and PCI stenting.     cardiac catheterization with graft angiography showed LV ejection fraction of 40%, severe native three-vessel coronary disease with 90% calcified stenosis involving the distal left main involving the ostium of the LAD significant 99% lesion of left circumflex coronary artery.  100% occlusion of the nondominant right coronary artery was noted as well.  Ashby to the LAD was patent and saphenous vein graft to marginal branch was patent as well. Echocardiogram with LV ejection fraction of 45% with moderate mitral regurgitation      Interval History:  Denies any new complaints  Occasional chest discomfort with the maximal activity  Unchanged from before  No unstable symptoms  Occasional palpitations unchanged from before    Interpretation Summary     · The left ventricular cavity is mildly dilated.  · Left ventricular wall thickness is consistent with mild-to-moderate concentric hypertrophy.  · Mild tricuspid valve regurgitation is present.  · Mild pulmonic valve regurgitation is present.  · Estimated EF = 55%.  · Left ventricular systolic function is normal.  · Left atrial cavity size is mild-to-moderately dilated.  · Left ventricular diastolic dysfunction (grade I) consistent with impaired relaxation.  · Mild-to-moderate mitral valve regurgitation is present  · Mild aortic valve regurgitation is present.     Study Impressions     A relatively benign monitor study. Kaveh Vences was in Normal Sinus w/ IVCD.  The average heart rate, excluding ectopy, was 62 BPM with a  minimum of 48 BPM at 10:59 D1 and a maximum of 95 BPM at 10:13 D2.  Heart beats, including ectopy, totaled 13252 beats.  There were no VENTRICULAR ectopics found.  SUPRAVENTRICULAR ECTOPICS totaled 15 averaging 0.6 per hour ,with 13 single and 2 paired beats.  Patient diary was submitted, no symptoms noted. Patient triggered events noted.           Assessment & Plan    Impressions:  Coronary artery disease status post coronary artery bypass surgery  Hypertension  Hyperlipidemia  Sinus bradycardia with a left bundle branch block  Symptoms of intermittent chest discomfort and palpitations unchanged from before    Echocardiogram in May 2020 with a normal LV systolic function no significant valve pathology  Holter monitor done in January 2020 with no significant pathology      Recommendations:    Good functional capacity  Cardiogram discussed with the patient  Labs with the primary care physician office  Plan treat patient conservatively from cardiac standpoint  Continue current medical therapy  Patient is already on maximal medical therapy  Continue close monitoring  Repeat echocardiogram next year to assess the severity of the mitral regurgitation  Labs from recent ER visit discussed with the patient  Follow-up in office 3 months    Objective:    Vitals:  Vitals:    06/01/20 1437   BP: 124/71   Pulse: 51   SpO2: (!) 82%   Weight: 84.4 kg (186 lb)       Physical Exam:    General: Alert, cooperative, no distress, appears stated age  Head:  Normocephalic, atraumatic, mucous membranes moist  Eyes:  Conjunctiva/corneas clear, EOM's intact     Neck:  Supple,  no adenopathy;      Lungs: Clear to auscultation bilaterally, no wheezes rhonchi rales are noted  Chest wall: No tenderness  Heart::  Regular rate and rhythm, S1 and S2 normal, no murmur, rub or gallop  Abdomen: Soft, non-tender, nondistended bowel sounds active  Extremities: No cyanosis, clubbing, or edema  Pulses: 2+ and symmetric all extremities  Skin:  No rashes or  lesions  Neuro/psych: A&O x3. CN II through XII are grossly intact with appropriate affect      Allergies:  No Known Allergies    Medication Review:     Current Outpatient Medications:   •  amLODIPine (NORVASC) 5 MG tablet, Take 1 tablet by mouth Daily., Disp: 90 tablet, Rfl: 3  •  aspirin 81 MG EC tablet, Take 81 mg by mouth Daily., Disp: , Rfl:   •  atorvastatin (LIPITOR) 20 MG tablet, Take 1 tablet by mouth Daily., Disp: 90 tablet, Rfl: 3  •  clopidogrel (PLAVIX) 75 MG tablet, Take 1 tablet by mouth Daily., Disp: 90 tablet, Rfl: 3  •  isosorbide mononitrate (IMDUR) 30 MG 24 hr tablet, Take 1 tablet by mouth Daily., Disp: 90 tablet, Rfl: 3  •  lisinopril (PRINIVIL,ZESTRIL) 20 MG tablet, Take 1 tablet by mouth 2 (Two) Times a Day., Disp: 180 tablet, Rfl: 3  •  metFORMIN (GLUCOPHAGE) 1000 MG tablet, Take 1,000 mg by mouth 2 (Two) Times a Day With Meals., Disp: , Rfl:   •  metoprolol tartrate (LOPRESSOR) 50 MG tablet, Take 1 tablet by mouth 2 (Two) Times a Day., Disp: 180 tablet, Rfl: 3  •  nitroglycerin (NITROSTAT) 0.4 MG SL tablet, Place 1 tablet under the tongue Every 5 (Five) Minutes As Needed for Chest Pain. Take no more than 3 doses in 15 minutes., Disp: 30 tablet, Rfl: 4  •  ranolazine (RANEXA) 500 MG 12 hr tablet, Take 1 tablet by mouth 2 (Two) Times a Day., Disp: 180 tablet, Rfl: 3    Family History:  Family History   Problem Relation Age of Onset   • Coronary artery disease Father    • Coronary artery disease Brother    • Heart attack Brother    • Stroke Brother    • Diabetes Maternal Uncle        Past Medical History:  Past Medical History:   Diagnosis Date   • Atrial fibrillation (CMS/HCC)    • Coronary artery disease    • Diabetes mellitus (CMS/HCC)    • Gallstones    • Hyperlipidemia    • Hypertension    • Mitral regurgitation    • Rheumatoid arthritis (CMS/HCC)    • Sleep apnea    • Valvular disease        Past surgical History:  Past Surgical History:   Procedure Laterality Date   • BACK SURGERY   01/2019   • CARDIAC CATHETERIZATION  2006, 2009, 2012, 2018   • CHOLECYSTECTOMY  12/22/2015   • CORONARY ANGIOPLASTY  2006, 2009   • CORONARY ARTERY BYPASS GRAFT  05/11/2012    x2   • ROTATOR CUFF REPAIR Left        Social History:  Social History     Socioeconomic History   • Marital status:      Spouse name: Not on file   • Number of children: Not on file   • Years of education: Not on file   • Highest education level: Not on file   Tobacco Use   • Smoking status: Never Smoker   • Smokeless tobacco: Never Used   Substance and Sexual Activity   • Alcohol use: No   • Drug use: No   • Sexual activity: Defer       Review of Systems:  The following systems were reviewed as they relate to the cardiovascular system: Constitutional, Eyes, ENT, Cardiovascular, Respiratory, Gastrointestinal, Integumentary, Neurological, Psychiatric, Hematologic, Endocrine, Musculoskeletal, and Genitourinary. The pertinent cardiovascular findings are reported above with all other cardiovascular points within those systems being negative.    Diagnostic Study Review:     Current Electrocardiogram:  Procedures      NOTE: The following portions of the patient's history were reviewed and updated this visit as appropriate: allergies, current medications, past family history, past medical history, past social history, past surgical history and problem list.

## 2020-06-15 ENCOUNTER — HOSPITAL ENCOUNTER (OUTPATIENT)
Dept: PAIN MEDICINE | Facility: HOSPITAL | Age: 70
Discharge: HOME | End: 2020-06-15

## 2020-06-15 ENCOUNTER — HOSPITAL ENCOUNTER (OUTPATIENT)
Dept: PAIN MEDICINE | Facility: HOSPITAL | Age: 70
Discharge: HOME | End: 2020-06-15
Admitting: ANESTHESIOLOGY

## 2020-06-15 VITALS
OXYGEN SATURATION: 98 % | BODY MASS INDEX: 29.03 KG/M2 | RESPIRATION RATE: 16 BRPM | HEART RATE: 46 BPM | DIASTOLIC BLOOD PRESSURE: 68 MMHG | HEIGHT: 67 IN | WEIGHT: 185 LBS | TEMPERATURE: 97.9 F | SYSTOLIC BLOOD PRESSURE: 118 MMHG

## 2020-06-15 DIAGNOSIS — M54.50 LOWER BACK PAIN: ICD-10-CM

## 2020-06-15 DIAGNOSIS — M47.817 LUMBOSACRAL SPONDYLOSIS WITHOUT MYELOPATHY: Primary | ICD-10-CM

## 2020-06-15 PROCEDURE — 64494 INJ PARAVERT F JNT L/S 2 LEV: CPT | Performed by: ANESTHESIOLOGY

## 2020-06-15 PROCEDURE — 0 IOPAMIDOL 41 % SOLUTION: Performed by: ANESTHESIOLOGY

## 2020-06-15 PROCEDURE — 64493 INJ PARAVERT F JNT L/S 1 LEV: CPT | Performed by: ANESTHESIOLOGY

## 2020-06-15 PROCEDURE — 64495 INJ PARAVERT F JNT L/S 3 LEV: CPT | Performed by: ANESTHESIOLOGY

## 2020-06-15 PROCEDURE — 25010000002 METHYLPREDNISOLONE PER 40 MG

## 2020-06-15 PROCEDURE — 77003 FLUOROGUIDE FOR SPINE INJECT: CPT

## 2020-06-15 RX ORDER — BUPIVACAINE HYDROCHLORIDE 2.5 MG/ML
10 INJECTION, SOLUTION INFILTRATION; PERINEURAL ONCE
Status: COMPLETED | OUTPATIENT
Start: 2020-06-15 | End: 2020-06-15

## 2020-06-15 RX ORDER — LIDOCAINE HYDROCHLORIDE 10 MG/ML
5 INJECTION, SOLUTION EPIDURAL; INFILTRATION; INTRACAUDAL; PERINEURAL ONCE
Status: COMPLETED | OUTPATIENT
Start: 2020-06-15 | End: 2020-06-15

## 2020-06-15 RX ORDER — METHYLPREDNISOLONE ACETATE 40 MG/ML
INJECTION, SUSPENSION INTRA-ARTICULAR; INTRALESIONAL; INTRAMUSCULAR; SOFT TISSUE
Status: DISCONTINUED
Start: 2020-06-15 | End: 2020-06-16 | Stop reason: HOSPADM

## 2020-06-15 RX ORDER — BUPIVACAINE HYDROCHLORIDE 2.5 MG/ML
INJECTION, SOLUTION EPIDURAL; INFILTRATION; INTRACAUDAL
Status: DISCONTINUED
Start: 2020-06-15 | End: 2020-06-16 | Stop reason: HOSPADM

## 2020-06-15 RX ORDER — METHYLPREDNISOLONE ACETATE 40 MG/ML
40 INJECTION, SUSPENSION INTRA-ARTICULAR; INTRALESIONAL; INTRAMUSCULAR; SOFT TISSUE ONCE
Status: COMPLETED | OUTPATIENT
Start: 2020-06-15 | End: 2020-06-15

## 2020-06-15 RX ORDER — LIDOCAINE HYDROCHLORIDE 10 MG/ML
INJECTION, SOLUTION EPIDURAL; INFILTRATION; INTRACAUDAL; PERINEURAL
Status: DISCONTINUED
Start: 2020-06-15 | End: 2020-06-16 | Stop reason: HOSPADM

## 2020-06-15 RX ADMIN — LIDOCAINE HYDROCHLORIDE 5 ML: 10 INJECTION, SOLUTION EPIDURAL; INFILTRATION; INTRACAUDAL; PERINEURAL at 12:14

## 2020-06-15 RX ADMIN — BUPIVACAINE HYDROCHLORIDE 10 ML: 2.5 INJECTION, SOLUTION INFILTRATION; PERINEURAL at 12:19

## 2020-06-15 RX ADMIN — IOPAMIDOL 3 ML: 408 INJECTION, SOLUTION INTRATHECAL at 12:19

## 2020-06-15 RX ADMIN — METHYLPREDNISOLONE ACETATE 40 MG: 40 INJECTION, SUSPENSION INTRA-ARTICULAR; INTRALESIONAL; INTRAMUSCULAR; SOFT TISSUE at 12:20

## 2020-06-15 NOTE — PROCEDURES
"Subjective    CC back pain  Kaveh Vences is a 69 y.o. male with lumbar spondylosis here for bilateral lumbar medial branch block.  Off Plavix.     Pain Assessment   Location of Pain: Lower Back, R Hip, L Hip,   Description of Pain: Dull/Aching, Throbbing, Stabbing  Previous Pain Rating :7  Current Pain Ratin  Aggravating Factors: Activity  Alleviating Factors: Rest, Medication    The following portions of the patient's history were reviewed and updated as appropriate: allergies, current medications, past family history, past medical history, past social history, past surgical history and problem list.      Review of Systems  As in HPI  Objective   Physical Exam   Constitutional: He is oriented to person, place, and time. No distress.   Cardiovascular: Normal rate.   Pulmonary/Chest: Effort normal.   Musculoskeletal:        Lumbar back: He exhibits decreased range of motion and tenderness.   Neurological: He is alert and oriented to person, place, and time.   Psychiatric: He has a normal mood and affect.     /68 (BP Location: Right arm, Patient Position: Sitting)   Pulse (!) 46   Temp 97.9 °F (36.6 °C) (Oral)   Resp 16   Ht 170.2 cm (67\")   Wt 83.9 kg (185 lb)   SpO2 98%   BMI 28.98 kg/m²       Assessment/Plan    underwent bilateral lumbar medial branch block    RTC 4-6 weeks or as needed for repeat    DATE OF PROCEDURE: 6/15/2020    PREOPERATIVE DIAGNOSIS: Lumbar spondylosis without myelopathy    POSTOPERATIVE DIAGNOSIS: same    PROCEDURE PERFORMED: Dax Lumbar Medial Branch Block at L3/L4, L4/L5, L5/S1.     The patient presents with a history of lumbosacral spondylosis bilaterally at level [ L3/L4] [ L4/L5 ] [ L5/S1].   The patient understands the risks and benefits of the procedure and wishes to proceed. The patient was seen in the preoperative area.  Patient's consent was obtained and updated.  Vitals were taken.  Patient was then brought to the procedure suite and placed in a prone position. The " appropriate anatomic area was widely prepped with Chloroprep and draped in a sterile fashion.  Noninvasive monitoring per routine anesthesia protocol was placed.  Under fluoroscopic guidance an AP view with caudad cephaled tilt was obtained. A 22 gauge curved tip spinal needle was passed through skin anesthetized with 1% Lidocaine without epinephrine. The needle tip was guided into the superior medial aspect of the transverse process at  [ L3 ] [ L4 ] [ L5 ] [ sacral ala ].  Next 0.25 mL of  preservative free contrast were injected.   At this point 0.5 mL of a solution  containing 1 mL of 40 mg Depo-Medrol and 4 mL of 0.25% bupivacaine was injected. A sterile dressing was placed over the puncture site. The patient tolerated the procedure with  no complications. They were then brought to the post procedure area where they recovered nicely.

## 2020-06-15 NOTE — PATIENT INSTRUCTIONS
Medial Branch Nerve Block    Medial branch nerve block is a procedure to numb the nerves that supply the joints between your spinal bones (facet joints). The facet joints are located on the back of your spine. You may have the procedure on your neck or your upper, middle, or lower spine.  During this procedure, your health care provider will inject a numbing medicine (local anesthetic) around the medial nerves near the facet joint being treated. If more than one facet joint is causing pain, you may have more than one injection. In most cases, an anti-inflammatory medicine (steroid) will also be injected. You may need this procedure if:  · You have back pain from wear and tear (osteoarthritis) of your facet joint.  · You have an injury to a facet joint.  · Your health care provider wants to diagnose a facet joint as the cause of your pain. If the procedure relieves the pain, this indicates that the facet joint was the cause.  The local anesthetic will relieve pain for several days. The steroid may continue to relieve pain for several weeks. If your pain returns when the medicines wear off, this procedure may be repeated.  Tell a health care provider about:  · Any allergies you have.  · All medicines you are taking, including vitamins, herbs, eye drops, creams, and over-the-counter medicines.  · Any problems you or family members have had with anesthetic medicines.  · Any blood disorders you have.  · Any surgeries you have had.  · Any medical conditions you have.  · Whether you are pregnant or may be pregnant.  What are the risks?  Generally, this is a safe procedure. However, problems may occur, including:  · Infection.  · Bleeding.  · Allergic reactions to medicines or dyes.  · Damage to other structures or organs.  · Injection of the anesthetic into a blood vessel, which may decrease blood supply to your spinal cord and cause damage.  · Spread of the anesthetic to nearby nerves, which may cause temporary weakness  or numbness.  What happens before the procedure?  · Ask your health care provider about:  ? Changing or stopping your regular medicines. This is especially important if you are taking diabetes medicines or blood thinners.  ? Taking medicines such as aspirin and ibuprofen. These medicines can thin your blood. Do not take these medicines unless your health care provider tells you to take them.  ? Taking over-the-counter medicines, vitamins, herbs, and supplements.  · Plan to have someone take you home from the hospital or clinic.  · Follow instructions from your health care provider about any eating or drinking restrictions before the procedure.  · Ask your health care provider what steps will be taken to help prevent infection. These may include:  ? Removing hair at the injection site.  ? Washing skin with a germ-killing soap.  What happens during the procedure?  · An IV may be inserted into one of your veins.  · You will be given one or more of the following:  ? A medicine to help you relax (sedative).  ? A medicine to numb the area (local anesthetic). Your health care provider will feel for the facet joint or joints that are causing pain and inject a short-acting local anesthetic into the skin over the joint or joints.  · Your health care provider will then pass a needle into the area around the facet joint.  · Your health care provider may use a type of X-ray (fluoroscopy) to look at images of your spinal cord. If so, the health care provider will inject a small amount of dye into the facet joint area. The dye will show up on fluoroscopy and help locate the exact area to inject the long-acting anesthetic.  · The medicine will then be injected. Along with the long-acting anesthetic, a steroid medicine may also be injected.  · The needle will be removed, and a bandage will be placed over the injection site.  The procedure may vary among health care providers and hospitals.  What can I expect after the  procedure?  · Your blood pressure, heart rate, breathing rate, and blood oxygen level will be monitored until you leave the hospital or clinic.  · You should feel less pain in your back.  · You may have some soreness around the injection site.  Follow these instructions at home:  Injection site care  · Leave your bandage on for 24 hours.  · Do not take baths, swim, or use a hot tub until your health care provider approves.  · Check your injection site every day for signs of infection. Check for:  ? Redness, swelling, or pain.  ? Fluid or blood.  ? Warmth.  ? Pus or a bad smell.  · If directed, put ice on the injection area:  ? Put ice in a plastic bag.  ? Place a towel between your skin and the bag.  ? Leave the ice on for 20 minutes, 2-3 times a day.  General instructions  · Take over-the-counter and prescription medicines only as told by your health care provider.  · Do not drive for 24 hours if you were given a sedative during the procedure.  · Return to your normal activities as told by your health care provider. Ask your health care provider what activities are safe for you.  · Keep a log of your pain after the procedure. Keep track of how much pain you have and when you have it. This will help your health care provider plan your future treatment.  ? You should have relief of pain from the anesthetic for up to 3 days.  ? After that you may notice some pain again until the steroid starts to help. Pain relief from the steroid may last for a few weeks.  · Keep all follow-up visits as told by your health care provider. This is important.  Contact your health care provider if:  · Your pain is not relieved or gets worse at home.  · You have a fever or chills.  · You have any signs of infection.  · You develop any numbness or weakness.  Summary  · Medial branch nerve block is a procedure to numb the nerves that supply the joints between your spinal bones (facet joint).  · You may have the procedure on your neck or  your upper, middle, or lower spine.  · This procedure may be done both to diagnose and relieve facet joint pain.  · A long-acting local anesthetic is injected close to the nerve that supplies the facet joint. An anti-inflammatory medicine (steroid) will also be injected.  This information is not intended to replace advice given to you by your health care provider. Make sure you discuss any questions you have with your health care provider.  Document Released: 08/22/2019 Document Revised: 04/10/2020 Document Reviewed: 08/22/2019  [employer]vier Patient Education © 2020 [employer]

## 2020-07-15 ENCOUNTER — TELEPHONE (OUTPATIENT)
Dept: PAIN MEDICINE | Facility: HOSPITAL | Age: 70
End: 2020-07-15

## 2020-07-15 ENCOUNTER — HOSPITAL ENCOUNTER (OUTPATIENT)
Dept: PAIN MEDICINE | Facility: HOSPITAL | Age: 70
Discharge: HOME | End: 2020-07-15

## 2020-07-15 ENCOUNTER — HOSPITAL ENCOUNTER (OUTPATIENT)
Dept: PAIN MEDICINE | Facility: HOSPITAL | Age: 70
Discharge: HOME | End: 2020-07-15
Admitting: ANESTHESIOLOGY

## 2020-07-15 VITALS
OXYGEN SATURATION: 98 % | BODY MASS INDEX: 29.03 KG/M2 | HEIGHT: 67 IN | TEMPERATURE: 97.1 F | HEART RATE: 52 BPM | DIASTOLIC BLOOD PRESSURE: 67 MMHG | RESPIRATION RATE: 16 BRPM | WEIGHT: 185 LBS | SYSTOLIC BLOOD PRESSURE: 118 MMHG

## 2020-07-15 DIAGNOSIS — M54.50 LOWER BACK PAIN: ICD-10-CM

## 2020-07-15 DIAGNOSIS — M47.817 LUMBOSACRAL SPONDYLOSIS WITHOUT MYELOPATHY: Primary | ICD-10-CM

## 2020-07-15 PROCEDURE — 25010000002 METHYLPREDNISOLONE PER 40 MG

## 2020-07-15 PROCEDURE — 64494 INJ PARAVERT F JNT L/S 2 LEV: CPT | Performed by: ANESTHESIOLOGY

## 2020-07-15 PROCEDURE — 77003 FLUOROGUIDE FOR SPINE INJECT: CPT

## 2020-07-15 PROCEDURE — 0 IOPAMIDOL 41 % SOLUTION: Performed by: ANESTHESIOLOGY

## 2020-07-15 PROCEDURE — 64495 INJ PARAVERT F JNT L/S 3 LEV: CPT | Performed by: ANESTHESIOLOGY

## 2020-07-15 PROCEDURE — 64493 INJ PARAVERT F JNT L/S 1 LEV: CPT | Performed by: ANESTHESIOLOGY

## 2020-07-15 RX ORDER — LIDOCAINE HYDROCHLORIDE 10 MG/ML
5 INJECTION, SOLUTION EPIDURAL; INFILTRATION; INTRACAUDAL; PERINEURAL ONCE
Status: COMPLETED | OUTPATIENT
Start: 2020-07-15 | End: 2020-07-15

## 2020-07-15 RX ORDER — BUPIVACAINE HYDROCHLORIDE 2.5 MG/ML
10 INJECTION, SOLUTION INFILTRATION; PERINEURAL ONCE
Status: COMPLETED | OUTPATIENT
Start: 2020-07-15 | End: 2020-07-15

## 2020-07-15 RX ORDER — METFORMIN HYDROCHLORIDE 500 MG/1
TABLET, EXTENDED RELEASE ORAL
COMMUNITY
Start: 2020-06-30 | End: 2020-07-15 | Stop reason: ALTCHOICE

## 2020-07-15 RX ORDER — BUPIVACAINE HYDROCHLORIDE 2.5 MG/ML
INJECTION, SOLUTION EPIDURAL; INFILTRATION; INTRACAUDAL
Status: DISPENSED
Start: 2020-07-15 | End: 2020-07-15

## 2020-07-15 RX ORDER — METHYLPREDNISOLONE ACETATE 40 MG/ML
40 INJECTION, SUSPENSION INTRA-ARTICULAR; INTRALESIONAL; INTRAMUSCULAR; SOFT TISSUE ONCE
Status: COMPLETED | OUTPATIENT
Start: 2020-07-15 | End: 2020-07-15

## 2020-07-15 RX ORDER — METHYLPREDNISOLONE ACETATE 40 MG/ML
INJECTION, SUSPENSION INTRA-ARTICULAR; INTRALESIONAL; INTRAMUSCULAR; SOFT TISSUE
Status: DISPENSED
Start: 2020-07-15 | End: 2020-07-15

## 2020-07-15 RX ORDER — LIDOCAINE HYDROCHLORIDE 10 MG/ML
INJECTION, SOLUTION EPIDURAL; INFILTRATION; INTRACAUDAL; PERINEURAL
Status: DISPENSED
Start: 2020-07-15 | End: 2020-07-15

## 2020-07-15 RX ADMIN — LIDOCAINE HYDROCHLORIDE 5 ML: 10 INJECTION, SOLUTION EPIDURAL; INFILTRATION; INTRACAUDAL; PERINEURAL at 10:54

## 2020-07-15 RX ADMIN — IOPAMIDOL 4 ML: 408 INJECTION, SOLUTION INTRATHECAL at 10:59

## 2020-07-15 RX ADMIN — BUPIVACAINE HYDROCHLORIDE 10 ML: 2.5 INJECTION, SOLUTION INFILTRATION; PERINEURAL at 11:00

## 2020-07-15 RX ADMIN — METHYLPREDNISOLONE ACETATE 40 MG: 40 INJECTION, SUSPENSION INTRA-ARTICULAR; INTRALESIONAL; INTRAMUSCULAR; SOFT TISSUE at 11:00

## 2020-07-15 NOTE — PROCEDURES
"Subjective    CC back pain  Kaveh Vences is a 69 y.o. male with lumbar spondylosis here for repeat bilateral lumbar medial branch block.  Off Plavix.   Reports 90% relief from first bilateral lumbar medial branch block x2 weeks.    Pain Assessment   Location of Pain: Lower Back, R Hip, L Hip,   Description of Pain: Dull/Aching, Throbbing, Stabbing  Previous Pain Rating :7  Current Pain Ratin  Aggravating Factors: Activity  Alleviating Factors: Rest, Medication    The following portions of the patient's history were reviewed and updated as appropriate: allergies, current medications, past family history, past medical history, past social history, past surgical history and problem list.      Review of Systems  As in HPI  Objective   Physical Exam   Constitutional: He is oriented to person, place, and time. No distress.   Cardiovascular: Normal rate.   Pulmonary/Chest: Effort normal.   Musculoskeletal:        Lumbar back: He exhibits decreased range of motion and tenderness.   Neurological: He is alert and oriented to person, place, and time.   Psychiatric: He has a normal mood and affect.     /67 (BP Location: Left arm, Patient Position: Sitting)   Pulse 52   Temp 97.1 °F (36.2 °C) (Skin)   Resp 16   Ht 170.2 cm (67\")   Wt 83.9 kg (185 lb)   SpO2 98%   BMI 28.98 kg/m²       Assessment/Plan    underwent repeat bilateral lumbar medial branch block.  Reported 100% relief 15 minutes after the procedure.  Also had 90% relief for a week after the first bilateral medial branch block.    We will schedule for right and left lumbar RFA without sedation.  Needs to be off Plavix 7 days prior to procedure.    RTC 4-6 weeks or as needed for repeat    DATE OF PROCEDURE: 7/15/2020    PREOPERATIVE DIAGNOSIS: Lumbar spondylosis without myelopathy    POSTOPERATIVE DIAGNOSIS: same    PROCEDURE PERFORMED: Dax Lumbar Medial Branch Block at L3/L4, L4/L5, L5/S1.     The patient presents with a history of lumbosacral " spondylosis bilaterally at level [ L3/L4] [ L4/L5 ] [ L5/S1].   The patient understands the risks and benefits of the procedure and wishes to proceed. The patient was seen in the preoperative area.  Patient's consent was obtained and updated.  Vitals were taken.  Patient was then brought to the procedure suite and placed in a prone position. The appropriate anatomic area was widely prepped with Chloroprep and draped in a sterile fashion.  Noninvasive monitoring per routine anesthesia protocol was placed.  Under fluoroscopic guidance an AP view with caudad cephaled tilt was obtained. A 22 gauge curved tip spinal needle was passed through skin anesthetized with 1% Lidocaine without epinephrine. The needle tip was guided into the superior medial aspect of the transverse process at  [ L3 ] [ L4 ] [ L5 ] [ sacral ala ].  Next 0.25 mL of  preservative free contrast were injected.   At this point 0.5 mL of a solution  containing 1 mL of 40 mg Depo-Medrol and 4 mL of 0.25% bupivacaine was injected. A sterile dressing was placed over the puncture site. The patient tolerated the procedure with  no complications. They were then brought to the post procedure area where they recovered nicely.

## 2020-09-09 ENCOUNTER — HOSPITAL ENCOUNTER (OUTPATIENT)
Dept: PAIN MEDICINE | Facility: HOSPITAL | Age: 70
Discharge: HOME | End: 2020-09-09

## 2020-09-09 VITALS
BODY MASS INDEX: 29.03 KG/M2 | OXYGEN SATURATION: 97 % | HEIGHT: 67 IN | TEMPERATURE: 98 F | HEART RATE: 59 BPM | RESPIRATION RATE: 16 BRPM | SYSTOLIC BLOOD PRESSURE: 139 MMHG | DIASTOLIC BLOOD PRESSURE: 81 MMHG | WEIGHT: 185 LBS

## 2020-09-09 DIAGNOSIS — M54.50 LOWER BACK PAIN: ICD-10-CM

## 2020-09-09 DIAGNOSIS — M47.817 LUMBOSACRAL SPONDYLOSIS WITHOUT MYELOPATHY: Primary | ICD-10-CM

## 2020-09-09 PROCEDURE — 77003 FLUOROGUIDE FOR SPINE INJECT: CPT

## 2020-09-09 PROCEDURE — 25010000002 METHYLPREDNISOLONE PER 40 MG

## 2020-09-09 PROCEDURE — 64635 DESTROY LUMB/SAC FACET JNT: CPT | Performed by: ANESTHESIOLOGY

## 2020-09-09 PROCEDURE — 64636 DESTROY L/S FACET JNT ADDL: CPT | Performed by: ANESTHESIOLOGY

## 2020-09-09 RX ORDER — LIDOCAINE HYDROCHLORIDE 10 MG/ML
5 INJECTION, SOLUTION EPIDURAL; INFILTRATION; INTRACAUDAL; PERINEURAL ONCE
Status: COMPLETED | OUTPATIENT
Start: 2020-09-09 | End: 2020-09-09

## 2020-09-09 RX ORDER — BUPIVACAINE HYDROCHLORIDE 2.5 MG/ML
10 INJECTION, SOLUTION INFILTRATION; PERINEURAL ONCE
Status: COMPLETED | OUTPATIENT
Start: 2020-09-09 | End: 2020-09-09

## 2020-09-09 RX ORDER — BUPIVACAINE HYDROCHLORIDE 2.5 MG/ML
INJECTION, SOLUTION EPIDURAL; INFILTRATION; INTRACAUDAL
Status: DISCONTINUED
Start: 2020-09-09 | End: 2020-09-10 | Stop reason: HOSPADM

## 2020-09-09 RX ORDER — METHYLPREDNISOLONE ACETATE 40 MG/ML
40 INJECTION, SUSPENSION INTRA-ARTICULAR; INTRALESIONAL; INTRAMUSCULAR; SOFT TISSUE ONCE
Status: COMPLETED | OUTPATIENT
Start: 2020-09-09 | End: 2020-09-09

## 2020-09-09 RX ORDER — METFORMIN HYDROCHLORIDE 500 MG/1
TABLET, EXTENDED RELEASE ORAL
COMMUNITY
Start: 2020-08-13

## 2020-09-09 RX ORDER — LIDOCAINE HYDROCHLORIDE 10 MG/ML
INJECTION, SOLUTION EPIDURAL; INFILTRATION; INTRACAUDAL; PERINEURAL
Status: DISCONTINUED
Start: 2020-09-09 | End: 2020-09-10 | Stop reason: HOSPADM

## 2020-09-09 RX ORDER — METHYLPREDNISOLONE ACETATE 40 MG/ML
INJECTION, SUSPENSION INTRA-ARTICULAR; INTRALESIONAL; INTRAMUSCULAR; SOFT TISSUE
Status: DISCONTINUED
Start: 2020-09-09 | End: 2020-09-10 | Stop reason: HOSPADM

## 2020-09-09 RX ADMIN — BUPIVACAINE HYDROCHLORIDE 10 ML: 2.5 INJECTION, SOLUTION INFILTRATION; PERINEURAL at 14:05

## 2020-09-09 RX ADMIN — LIDOCAINE HYDROCHLORIDE 5 ML: 10 INJECTION, SOLUTION EPIDURAL; INFILTRATION; INTRACAUDAL; PERINEURAL at 14:02

## 2020-09-09 RX ADMIN — METHYLPREDNISOLONE ACETATE 40 MG: 40 INJECTION, SUSPENSION INTRA-ARTICULAR; INTRALESIONAL; INTRAMUSCULAR; SOFT TISSUE at 14:11

## 2020-09-09 RX ADMIN — LIDOCAINE HYDROCHLORIDE 5 ML: 10 INJECTION, SOLUTION EPIDURAL; INFILTRATION; INTRACAUDAL; PERINEURAL at 14:05

## 2020-09-09 NOTE — PROCEDURES
"Subjective    CC back pain  Kaveh Vences is a 69 y.o. male with lumbar spondylosis here for left lumbar RFA.  Off Plavix 7 days    Pain Assessment   Location of Pain: Lower Back, R Hip, L Hip,   Description of Pain: Dull/Aching, Throbbing, Stabbing  Previous Pain Rating :7  Current Pain Ratin  Aggravating Factors: Activity  Alleviating Factors: Rest, Medication    The following portions of the patient's history were reviewed and updated as appropriate: allergies, current medications, past family history, past medical history, past social history, past surgical history and problem list.      Review of Systems  As in HPI  Objective   Physical Exam   Constitutional: He is oriented to person, place, and time. No distress.   Cardiovascular: Normal rate.   Pulmonary/Chest: Effort normal.   Musculoskeletal:        Lumbar back: He exhibits decreased range of motion and tenderness.   Neurological: He is alert and oriented to person, place, and time.   Psychiatric: He has a normal mood and affect.     /68 (BP Location: Right arm, Patient Position: Sitting)   Pulse 56   Temp 98 °F (36.7 °C) (Core)   Resp 16   Ht 170.2 cm (67\")   Wt 83.9 kg (185 lb)   SpO2 97%   BMI 28.98 kg/m²       Assessment/Plan    underwent left lumbar RFA    RTC as scheduled for right.  Is to be off Plavix 7 days prior to procedure.    DATE OF PROCEDURE:  2020     PREOPERATIVE DIAGNOSIS:   Lumbar spondylosis without myelopathy    POSTOPERATIVE DIAGNOSIS: same    PROCEDURE PERFORMED:  left  Lumbar Sacral RFTC at L3/L4, L4/L5, L5/S1.    The patient presents with a history of lumbosacral spondylosis on the left at level [  L3/L4 ][  L4/L5 ] [ L5/ S1 ].  The patient presents today for lumbosacral RFTC.  The patient understands the risks and benefits of the procedure and wishes to proceed.  The patient was seen in the preoperative area.  Patient's consent was obtained and updated.  Vitals were taken.  Patient was then brought to the " procedure suite and placed in a prone position.  The appropriate anatomic area was widely prepped with Chloraprep and draped in a sterile fashion.  Noninvasive monitoring per routine anesthesia protocol was placed.  Under fluoroscopic guidance an AP view with caudad cephaled tilt was obtained.  A 20 guage RFTC cannula was passed through skin anesthetized with 1% Lidocaine without epinephrine.  The needle tip was guided to the superior medial aspect of the transverse process at [  L3 ][  L4 ][  L5 and  sacral ala ].  Motor stimulation was undertaken at 2.0 mAmps at 2 Hertz. At no point was motor stimulation or sensory stimulation noted in a radicular fashion.  Impedence range 200's. Prior to ablation, each level was anesthetized with 2mL of 1% Lidocaine without epinephrine. The medial branch nerves were then ablated at 80* C for 90 seconds each .  Following ablation, each level was injected with 1 mL of  expiration containing 1 mL of 40 mg Depo-Medrol and 4 mL of 0.25% bupivacaine and the RFTC cannula removed.  A sterile dressing was placed over the puncture site.    The patient tolerated the procedure with no complications. They were then brought to the post procedure area where they recovered nicely.     Discharge  The patient will be discharged home in stable condition.  Patient understands to contact the Center with any post procedure questions or concerns.  Discharge instructions given by nursing staff.

## 2020-09-11 ENCOUNTER — TELEPHONE (OUTPATIENT)
Dept: CARDIOLOGY | Facility: CLINIC | Age: 70
End: 2020-09-11

## 2020-09-11 NOTE — TELEPHONE ENCOUNTER
Pt called stating that he has been off Plavix for 1 week due to having a procedure on his back and they want him to hold the Plavix 1 more week. He wants to know if Dr. Wong is okay with this. Per Dr. Wong okay to stay off Plavix for 1 more week. I informed the Pt of this and he stated understanding.

## 2020-09-16 ENCOUNTER — HOSPITAL ENCOUNTER (OUTPATIENT)
Dept: PAIN MEDICINE | Facility: HOSPITAL | Age: 70
Discharge: HOME | End: 2020-09-16

## 2020-09-16 VITALS
BODY MASS INDEX: 29.03 KG/M2 | SYSTOLIC BLOOD PRESSURE: 127 MMHG | DIASTOLIC BLOOD PRESSURE: 80 MMHG | HEIGHT: 67 IN | HEART RATE: 51 BPM | TEMPERATURE: 98 F | WEIGHT: 185 LBS | OXYGEN SATURATION: 96 % | RESPIRATION RATE: 16 BRPM

## 2020-09-16 DIAGNOSIS — R52 PAIN: ICD-10-CM

## 2020-09-16 DIAGNOSIS — M47.817 LUMBOSACRAL SPONDYLOSIS WITHOUT MYELOPATHY: Primary | ICD-10-CM

## 2020-09-16 PROCEDURE — 64635 DESTROY LUMB/SAC FACET JNT: CPT | Performed by: ANESTHESIOLOGY

## 2020-09-16 PROCEDURE — 25010000002 METHYLPREDNISOLONE PER 40 MG

## 2020-09-16 PROCEDURE — 77003 FLUOROGUIDE FOR SPINE INJECT: CPT

## 2020-09-16 PROCEDURE — 64636 DESTROY L/S FACET JNT ADDL: CPT | Performed by: ANESTHESIOLOGY

## 2020-09-16 RX ORDER — LIDOCAINE HYDROCHLORIDE 10 MG/ML
5 INJECTION, SOLUTION EPIDURAL; INFILTRATION; INTRACAUDAL; PERINEURAL ONCE
Status: COMPLETED | OUTPATIENT
Start: 2020-09-16 | End: 2020-09-16

## 2020-09-16 RX ORDER — METHYLPREDNISOLONE ACETATE 40 MG/ML
INJECTION, SUSPENSION INTRA-ARTICULAR; INTRALESIONAL; INTRAMUSCULAR; SOFT TISSUE
Status: DISCONTINUED
Start: 2020-09-16 | End: 2020-09-17 | Stop reason: HOSPADM

## 2020-09-16 RX ORDER — LIDOCAINE HYDROCHLORIDE 10 MG/ML
INJECTION, SOLUTION EPIDURAL; INFILTRATION; INTRACAUDAL; PERINEURAL
Status: DISCONTINUED
Start: 2020-09-16 | End: 2020-09-17 | Stop reason: HOSPADM

## 2020-09-16 RX ORDER — METHYLPREDNISOLONE ACETATE 40 MG/ML
40 INJECTION, SUSPENSION INTRA-ARTICULAR; INTRALESIONAL; INTRAMUSCULAR; SOFT TISSUE ONCE
Status: COMPLETED | OUTPATIENT
Start: 2020-09-16 | End: 2020-09-16

## 2020-09-16 RX ORDER — BUPIVACAINE HYDROCHLORIDE 2.5 MG/ML
INJECTION, SOLUTION EPIDURAL; INFILTRATION; INTRACAUDAL
Status: DISCONTINUED
Start: 2020-09-16 | End: 2020-09-17 | Stop reason: HOSPADM

## 2020-09-16 RX ORDER — BUPIVACAINE HYDROCHLORIDE 2.5 MG/ML
10 INJECTION, SOLUTION INFILTRATION; PERINEURAL ONCE
Status: COMPLETED | OUTPATIENT
Start: 2020-09-16 | End: 2020-09-16

## 2020-09-16 RX ADMIN — LIDOCAINE HYDROCHLORIDE 5 ML: 10 INJECTION, SOLUTION EPIDURAL; INFILTRATION; INTRACAUDAL; PERINEURAL at 13:42

## 2020-09-16 RX ADMIN — BUPIVACAINE HYDROCHLORIDE 10 ML: 2.5 INJECTION, SOLUTION INFILTRATION; PERINEURAL at 13:56

## 2020-09-16 RX ADMIN — METHYLPREDNISOLONE ACETATE 40 MG: 40 INJECTION, SUSPENSION INTRA-ARTICULAR; INTRALESIONAL; INTRAMUSCULAR; SOFT TISSUE at 13:56

## 2020-09-16 RX ADMIN — LIDOCAINE HYDROCHLORIDE 5 ML: 10 INJECTION, SOLUTION EPIDURAL; INFILTRATION; INTRACAUDAL; PERINEURAL at 13:51

## 2020-09-16 NOTE — ADDENDUM NOTE
Encounter addended by: Amy Ramírez RN on: 9/16/2020 2:05 PM   Actions taken: Flowsheet accepted, MAR administration accepted

## 2020-09-16 NOTE — PROCEDURES
"Subjective    CC back pain  Kaveh Vences is a 69 y.o. male with lumbar spondylosis here for right lumbar RFA.  Off Plavix 7 days    Pain Assessment   Location of Pain: Lower Back, R Hip, L Hip,   Description of Pain: Dull/Aching, Throbbing, Stabbing  Previous Pain Rating :7  Current Pain Ratin  Aggravating Factors: Activity  Alleviating Factors: Rest, Medication    The following portions of the patient's history were reviewed and updated as appropriate: allergies, current medications, past family history, past medical history, past social history, past surgical history and problem list.      Review of Systems  As in HPI  Objective   Physical Exam   Constitutional: He is oriented to person, place, and time. No distress.   Cardiovascular: Normal rate.   Pulmonary/Chest: Effort normal.   Musculoskeletal:      Lumbar back: He exhibits decreased range of motion and tenderness.   Neurological: He is alert and oriented to person, place, and time.     /70 (BP Location: Left arm, Patient Position: Sitting)   Pulse 51   Temp 98 °F (36.7 °C) (Core)   Resp 16   Ht 170.2 cm (67\")   Wt 83.9 kg (185 lb)   SpO2 98%   BMI 28.98 kg/m²       Assessment/Plan    underwent right lumbar RFA    RTC 6 weeks    DATE OF PROCEDURE:  2020     PREOPERATIVE DIAGNOSIS:   Lumbar spondylosis without myelopathy    POSTOPERATIVE DIAGNOSIS: same    PROCEDURE PERFORMED:  right lumbar Sacral RFTC at L3/L4, L4/L5, L5/S1.    The patient presents with a history of lumbosacral spondylosis on the right at level [  L3/L4 ][  L4/L5 ] [ L5/ S1 ].  The patient presents today for lumbosacral RFTC.  The patient understands the risks and benefits of the procedure and wishes to proceed.  The patient was seen in the preoperative area.  Patient's consent was obtained and updated.  Vitals were taken.  Patient was then brought to the procedure suite and placed in a prone position.  The appropriate anatomic area was widely prepped with Chloraprep and " draped in a sterile fashion.  Noninvasive monitoring per routine anesthesia protocol was placed.  Under fluoroscopic guidance an AP view with caudad cephaled tilt was obtained.  A 20 guage RFTC cannula was passed through skin anesthetized with 1% Lidocaine without epinephrine.  The needle tip was guided to the superior medial aspect of the transverse process at [  L3 ][  L4 ][  L5 and  sacral ala ].  Motor stimulation was undertaken at 2.0 mAmps at 2 Hertz. At no point was motor stimulation or sensory stimulation noted in a radicular fashion.  Impedence range 200's. Prior to ablation, each level was anesthetized with 2mL of 1% Lidocaine without epinephrine. The medial branch nerves were then ablated at 80* C for 90 seconds each .  Following ablation, each level was injected with 1 mL of  expiration containing 1 mL of 40 mg Depo-Medrol and 4 mL of 0.25% bupivacaine and the RFTC cannula removed.  A sterile dressing was placed over the puncture site.    The patient tolerated the procedure with no complications. They were then brought to the post procedure area where they recovered nicely.     Discharge  The patient will be discharged home in stable condition.  Patient understands to contact the Center with any post procedure questions or concerns.  Discharge instructions given by nursing staff.

## 2020-10-29 ENCOUNTER — OFFICE VISIT (OUTPATIENT)
Dept: PAIN MEDICINE | Facility: CLINIC | Age: 70
End: 2020-10-29

## 2020-10-29 VITALS — WEIGHT: 180 LBS | RESPIRATION RATE: 16 BRPM | HEIGHT: 67 IN | BODY MASS INDEX: 28.25 KG/M2

## 2020-10-29 DIAGNOSIS — G89.4 CHRONIC PAIN SYNDROME: Primary | ICD-10-CM

## 2020-10-29 DIAGNOSIS — M25.50 POLYARTHRALGIA: ICD-10-CM

## 2020-10-29 DIAGNOSIS — M47.817 LUMBOSACRAL SPONDYLOSIS WITHOUT MYELOPATHY: ICD-10-CM

## 2020-10-29 DIAGNOSIS — M96.1 POSTLAMINECTOMY SYNDROME OF LUMBAR REGION: ICD-10-CM

## 2020-10-29 PROCEDURE — 99442 PR PHYS/QHP TELEPHONE EVALUATION 11-20 MIN: CPT | Performed by: ANESTHESIOLOGY

## 2020-10-29 RX ORDER — METOPROLOL SUCCINATE 50 MG/1
TABLET, EXTENDED RELEASE ORAL
COMMUNITY
Start: 2020-10-21 | End: 2020-10-29 | Stop reason: ALTCHOICE

## 2020-10-29 NOTE — PROGRESS NOTES
Subjective    CC Back pain  Kaveh Vences is a 69 y.o. male with chronic back pain history of decompression/L2-3 microdiscectomy in 2019/Dr. Brandon, here for f/u by tel.  Lumbar RFA last visit with good relief and functional benefits.   Chronic axial back pain radiating to bilateral hip without radicular pain worse with standing walking or prolonged activity.  Denies weakness, saddle anesthesia bladder bowel continence.  Tried physical therapy and medication with marginal relief.  Interfering with ADL and sleep.   Seen neurosurgery, no intervention recommended at this time, referred to try injections.    L-spine x-ray  Good range of motion with flexion and extension.  No evidence of subluxation. Persistent degenerative disc space narrowing at the L3/4 and L5/[id number], unchanged from prior study.   L-spine MRI 2018 degenerative disc disease and mild facet degenerative change. Findings are most pronounced at L2-L3 were is moderate to severe neural foramen narrowing on the left due to the large focal disc extrusion.  mild neural foramen narrowing on the right at the L2-L3 level due to a smaller focal extrusion    Pain Assessment   Location of Pain: Lower Back  Description of Pain: Dull/Aching, Throbbing, Stabbing  Previous Pain Rating :4  Current Pain Ratin  Aggravating Factors: Activity  Alleviating Factors: Rest, Medication    PEG Assessment   What number best describes your pain on average in the past week?2  What number best describes how, during the past week, pain has interfered with your enjoyment of life?5  What number best describes how, during the past week, pain has interfered with your general activity?6      The following portions of the patient's history were reviewed and updated as appropriate: allergies, current medications, past family history, past medical history, past social history, past surgical history and problem list.     has a past medical history of Atrial fibrillation  "(CMS/[facility]), Coronary artery disease, Diabetes mellitus (CMS/[facility]), Gallstones, Hyperlipidemia, Hypertension, Low back pain, Mitral regurgitation, Rheumatoid arthritis (CMS/HCC), Sleep apnea, and Valvular disease.   has a past surgical history that includes Coronary artery bypass graft (05/11/2012); Coronary angioplasty (2006, 2009); Rotator cuff repair (Left); Cholecystectomy (12/22/2015); Cardiac catheterization (2006, 2009, 2012, 2018); and Back surgery (01/2019).  family history includes Coronary artery disease in his brother and father; Diabetes in his maternal uncle; Heart attack in his brother; Stroke in his brother.  Social History     Tobacco Use   • Smoking status: Never Smoker   • Smokeless tobacco: Never Used   Substance Use Topics   • Alcohol use: No       Review of Systems   Constitutional: Negative for chills, fatigue and fever.   HENT: Negative for swollen glands and trouble swallowing.    Respiratory: Negative.  Negative for shortness of breath.    Cardiovascular: Negative for chest pain, palpitations and leg swelling.   Gastrointestinal: Negative for nausea and vomiting.   Musculoskeletal: Positive for arthralgias and back pain. Negative for gait problem, joint swelling, myalgias, neck pain and neck stiffness.   Skin: Negative for color change, dry skin, rash and skin lesions.   Neurological: Negative for dizziness, weakness, light-headedness and headache.   Hematological: Negative for adenopathy. Does not bruise/bleed easily.   Psychiatric/Behavioral: Negative for behavioral problems, suicidal ideas and depressed mood.   All other systems reviewed and are negative.      Objective   Physical Exam   Constitutional: No distress.     Resp 16   Ht 170.2 cm (67\")   Wt 81.6 kg (180 lb)   BMI 28.19 kg/m²      PHQ 9 on chart  Opioid risk tool low risk    Assessment/Plan   Diagnoses and all orders for this visit:    1. Chronic pain syndrome (Primary)    2. Lumbosacral spondylosis without myelopathy    3. " Postlaminectomy syndrome of lumbar region    4. Polyarthralgia    Summary  Kaveh Vences is a 69 y.o. male with chronic back pain history of decompression/L2-3 microdiscectomy in January 2019/Dr. Brandon, here for f/u.   Chronic axial back pain from DDD spondylosis.  Chronic back pain from postlaminectomy syndrome and chronic polyarthralgia.  Marginal relief with medication.  Pain interferes with ADLs.  Seen neurosurgery, no surgery recommended at this time referred to try injections.    Lumbar RFA last visit with good relief and functional benefits.   Repeat as needed    telemedicine done to avoid coronavirus exposure.   A phone visit was used to complete visit. Total time  18 minutes    RTC in 4-5 mo or for procedure

## 2020-10-29 NOTE — PROGRESS NOTES
You have chosen to receive care through a telephone visit. Do you consent to use a telephone visit for your medical care today? y

## 2020-12-16 ENCOUNTER — OFFICE VISIT (OUTPATIENT)
Dept: CARDIOLOGY | Facility: CLINIC | Age: 70
End: 2020-12-16

## 2020-12-16 VITALS
OXYGEN SATURATION: 97 % | BODY MASS INDEX: 29.19 KG/M2 | WEIGHT: 186 LBS | DIASTOLIC BLOOD PRESSURE: 71 MMHG | HEIGHT: 67 IN | SYSTOLIC BLOOD PRESSURE: 133 MMHG | RESPIRATION RATE: 18 BRPM | HEART RATE: 53 BPM

## 2020-12-16 DIAGNOSIS — I27.20 PULMONARY HYPERTENSION (HCC): ICD-10-CM

## 2020-12-16 DIAGNOSIS — I25.708 ATHEROSCLEROSIS OF CORONARY ARTERY BYPASS GRAFT(S), UNSPECIFIED, WITH OTHER FORMS OF ANGINA PECTORIS (HCC): ICD-10-CM

## 2020-12-16 DIAGNOSIS — I25.10 CORONARY ARTERY DISEASE INVOLVING NATIVE CORONARY ARTERY OF NATIVE HEART WITHOUT ANGINA PECTORIS: Primary | ICD-10-CM

## 2020-12-16 DIAGNOSIS — R09.89 BILATERAL CAROTID BRUITS: ICD-10-CM

## 2020-12-16 DIAGNOSIS — I25.718 CORONARY ARTERY DISEASE OF AUTOLOGOUS VEIN BYPASS GRAFT WITH STABLE ANGINA PECTORIS (HCC): ICD-10-CM

## 2020-12-16 DIAGNOSIS — I34.0 MODERATE MITRAL REGURGITATION: ICD-10-CM

## 2020-12-16 DIAGNOSIS — I48.0 PAROXYSMAL ATRIAL FIBRILLATION (HCC): ICD-10-CM

## 2020-12-16 DIAGNOSIS — I25.5 ISCHEMIC CARDIOMYOPATHY: ICD-10-CM

## 2020-12-16 DIAGNOSIS — I20.9 ANGINA PECTORIS (HCC): ICD-10-CM

## 2020-12-16 DIAGNOSIS — I50.30 CONGESTIVE HEART FAILURE WITH LV DIASTOLIC DYSFUNCTION, NYHA CLASS 3 (HCC): ICD-10-CM

## 2020-12-16 DIAGNOSIS — I42.0 DILATED CARDIOMYOPATHY (HCC): ICD-10-CM

## 2020-12-16 DIAGNOSIS — I38 VALVULAR HEART DISEASE: ICD-10-CM

## 2020-12-16 DIAGNOSIS — I25.118 CORONARY ARTERY DISEASE OF NATIVE ARTERY OF NATIVE HEART WITH STABLE ANGINA PECTORIS (HCC): ICD-10-CM

## 2020-12-16 PROCEDURE — 99214 OFFICE O/P EST MOD 30 MIN: CPT | Performed by: INTERNAL MEDICINE

## 2020-12-16 PROCEDURE — 93000 ELECTROCARDIOGRAM COMPLETE: CPT | Performed by: INTERNAL MEDICINE

## 2020-12-16 RX ORDER — NITROGLYCERIN 0.4 MG/1
0.4 TABLET SUBLINGUAL
Qty: 30 TABLET | Refills: 4 | Status: SHIPPED | OUTPATIENT
Start: 2020-12-16 | End: 2021-03-24 | Stop reason: SDUPTHER

## 2020-12-16 RX ORDER — RANOLAZINE 1000 MG/1
1000 TABLET, EXTENDED RELEASE ORAL 2 TIMES DAILY
COMMUNITY
End: 2021-03-09 | Stop reason: SDUPTHER

## 2020-12-16 NOTE — PROGRESS NOTES
Cardiology Office Visit      Encounter Date:  12/16/2020    Patient ID:   Kaveh Vences is a 69 y.o. male.    Reason For Followup:  Coronary artery disease  Hypertension  Hyperlipidemia  Telephone visit    Brief Clinical History:  Dear Skinny Harper MD    I had the pleasure of seeing Kaveh Vences today. As you are well aware, this is a 69 y.o. male with history of diabetes,  coronary artery disease, status post previous CABG in 2012, and PCI stenting.     cardiac catheterization with graft angiography showed LV ejection fraction of 40%, severe native three-vessel coronary disease with 90% calcified stenosis involving the distal left main involving the ostium of the LAD significant 99% lesion of left circumflex coronary artery.  100% occlusion of the nondominant right coronary artery was noted as well.  [place] to the LAD was patent and saphenous vein graft to marginal branch was patent as well. Echocardiogram with LV ejection fraction of 45% with moderate mitral regurgitation      Interval History:  Denies any new complaints  Occasional chest discomfort with the maximal activity  Unchanged from before  No unstable symptoms  Occasional palpitations unchanged from before  Recent evaluation and work-up at [facility]    Interpretation Summary     · The left ventricular cavity is mildly dilated.  · Left ventricular wall thickness is consistent with mild-to-moderate concentric hypertrophy.  · Mild tricuspid valve regurgitation is present.  · Mild pulmonic valve regurgitation is present.  · Estimated EF = 55%.  · Left ventricular systolic function is normal.  · Left atrial cavity size is mild-to-moderately dilated.  · Left ventricular diastolic dysfunction (grade I) consistent with impaired relaxation.  · Mild-to-moderate mitral valve regurgitation is present  · Mild aortic valve regurgitation is present.     Study Impressions     A relatively benign monitor study. Kaveh Vences was in Normal Sinus w/ IVCD.  The average  heart rate, excluding ectopy, was 62 BPM with a minimum of 48 BPM at 10:59 D1 and a maximum of 95 BPM at 10:13 D2.  Heart beats, including ectopy, totaled 17197 beats.  There were no VENTRICULAR ectopics found.  SUPRAVENTRICULAR ECTOPICS totaled 15 averaging 0.6 per hour ,with 13 single and 2 paired beats.  Patient diary was submitted, no symptoms noted. Patient triggered events noted.           Assessment & Plan    Impressions:  Coronary artery disease status post coronary artery bypass surgery  Hypertension  Hyperlipidemia  Sinus bradycardia with a left bundle branch block  Symptoms of intermittent chest discomfort and palpitations unchanged from before    Echocardiogram in May 2020 with a normal LV systolic function no significant valve pathology  Holter monitor done in January 2020 with no significant pathology      Recommendations:  Recent evaluation at [facility] with a repeat cardiac catheterization with no significant change in the anatomy compared to before opted for medical therapy  Complaining of some exertional shortness of breath and occasional dizziness and occasional chest discomfort symptoms have been stable  Some of the dizziness could be from Ranexa  Plan to decrease Ranexa to 500 mg in the morning and 1000 mg at night  Good functional capacity  Cardiogram discussed with the patient  Labs with the primary care physician office  Plan treat patient conservatively from cardiac standpoint  Continue current medical therapy  Patient is already on maximal medical therapy  Continue close monitoring  Repeat echocardiogram before next visit to assess the severity of the mitral regurgitation  Labs reviewed and discussed with the patient  Follow-up with the primary care physicians for some memory issues to rule out underlying dementia  Schedule for carotid ultrasound  Follow-up in office 3 months    Objective:    Vitals:  Vitals:    12/16/20 0916   BP: 133/71   BP Location: Left arm   Pulse: 53   Resp:  "18   SpO2: 97%   Weight: 84.4 kg (186 lb)   Height: 170.2 cm (67\")       Physical Exam:    General: Alert, cooperative, no distress, appears stated age  Head:  Normocephalic, atraumatic, mucous membranes moist  Eyes:  Conjunctiva/corneas clear, EOM's intact     Neck:  Supple,  no adenopathy;      Lungs: Clear to auscultation bilaterally, no wheezes rhonchi rales are noted  Chest wall: No tenderness  Heart::  Regular rate and rhythm, S1 and S2 normal, no murmur, rub or gallop  Abdomen: Soft, non-tender, nondistended bowel sounds active  Extremities: No cyanosis, clubbing, or edema  Pulses: 2+ and symmetric all extremities  Skin:  No rashes or lesions  Neuro/psych: A&O x3. CN II through XII are grossly intact with appropriate affect  Carotid bruit    Allergies:  No Known Allergies    Medication Review:     Current Outpatient Medications:   •  amLODIPine (NORVASC) 5 MG tablet, Take 1 tablet by mouth Daily., Disp: 90 tablet, Rfl: 3  •  aspirin 81 MG EC tablet, Take 81 mg by mouth Daily., Disp: , Rfl:   •  atorvastatin (LIPITOR) 20 MG tablet, Take 1 tablet by mouth Daily., Disp: 90 tablet, Rfl: 3  •  clopidogrel (PLAVIX) 75 MG tablet, Take 1 tablet by mouth Daily., Disp: 90 tablet, Rfl: 3  •  isosorbide mononitrate (IMDUR) 30 MG 24 hr tablet, Take 1 tablet by mouth Daily., Disp: 90 tablet, Rfl: 3  •  lisinopril (PRINIVIL,ZESTRIL) 20 MG tablet, Take 1 tablet by mouth 2 (Two) Times a Day., Disp: 180 tablet, Rfl: 3  •  metFORMIN ER (GLUCOPHAGE-XR) 500 MG 24 hr tablet, TAKE 2 TABLETS BY MOUTH TWICE DAILY FOR 30 DAYS, Disp: , Rfl:   •  metoprolol tartrate (LOPRESSOR) 50 MG tablet, Take 1 tablet by mouth 2 (Two) Times a Day., Disp: 180 tablet, Rfl: 3  •  nitroglycerin (NITROSTAT) 0.4 MG SL tablet, Place 1 tablet under the tongue Every 5 (Five) Minutes As Needed for Chest Pain. Take no more than 3 doses in 15 minutes., Disp: 30 tablet, Rfl: 4  •  ranolazine (Ranexa) 1000 MG 12 hr tablet, Take 1,000 mg by mouth 2 (Two) Times a " Leonarda., Disp: , Rfl:     Family History:  Family History   Problem Relation Age of Onset   • Coronary artery disease Father    • Coronary artery disease Brother    • Heart attack Brother    • Stroke Brother    • Diabetes Maternal Uncle        Past Medical History:  Past Medical History:   Diagnosis Date   • Atrial fibrillation (CMS/HCC)    • Coronary artery disease    • Diabetes mellitus (CMS/HCC)    • Gallstones    • Hyperlipidemia    • Hypertension    • Low back pain    • Mitral regurgitation    • Rheumatoid arthritis (CMS/HCC)    • Sleep apnea    • Valvular disease        Past surgical History:  Past Surgical History:   Procedure Laterality Date   • BACK SURGERY  01/2019   • CARDIAC CATHETERIZATION  2006, 2009, 2012, 2018   • CHOLECYSTECTOMY  12/22/2015   • CORONARY ANGIOPLASTY  2006, 2009   • CORONARY ARTERY BYPASS GRAFT  05/11/2012    x2   • ROTATOR CUFF REPAIR Left        Social History:  Social History     Socioeconomic History   • Marital status:      Spouse name: Not on file   • Number of children: Not on file   • Years of education: Not on file   • Highest education level: Not on file   Tobacco Use   • Smoking status: Never Smoker   • Smokeless tobacco: Never Used   Substance and Sexual Activity   • Alcohol use: No   • Drug use: No   • Sexual activity: Defer       Review of Systems:  The following systems were reviewed as they relate to the cardiovascular system: Constitutional, Eyes, ENT, Cardiovascular, Respiratory, Gastrointestinal, Integumentary, Neurological, Psychiatric, Hematologic, Endocrine, Musculoskeletal, and Genitourinary. The pertinent cardiovascular findings are reported above with all other cardiovascular points within those systems being negative.    Diagnostic Study Review:     Current Electrocardiogram:    ECG 12 Lead    Date/Time: 12/16/2020 10:10 AM  Performed by: Cris Mcneill MD  Authorized by: Cris Mcneill MD   Comparison: compared with previous ECG    Similar to previous ECG  Rhythm: sinus rhythm and sinus bradycardia  Rate: normal  BPM: 53  Conduction: left bundle branch block  QRS axis: normal  Other findings: non-specific ST-T wave changes    Clinical impression: abnormal EKG              NOTE: The following portions of the patient's history were reviewed and updated this visit as appropriate: allergies, current medications, past family history, past medical history, past social history, past surgical history and problem list.

## 2021-01-22 ENCOUNTER — TELEPHONE (OUTPATIENT)
Dept: NEUROSURGERY | Facility: CLINIC | Age: 71
End: 2021-01-22

## 2021-01-22 NOTE — TELEPHONE ENCOUNTER
Caller: ОЛЬГА POWELL    Relationship to patient: PT    Best call back number: [phone]    Chief complaint: PAIN AT BASE OF NECK, WORSE WHEN SLEEPING, 8/10 WHEN FLARED UP. NO NUMBNESS OR TINGLING. PAIN IS CONSTANT AT A 3/10 WHEN PT TURNS HIS HEAD.      Type of visit: FOLLOW UP EXT    Requested date: ANY    If rescheduling, when is the original appointment: N/A    Additional notes: PT LAST SAW DR BECKER 3/02/2020 FOR LOW BACK PAIN. PT HAS NOT HAD RECENT IMAGING OF HIS NECK.    PLEASE CALL PT TO ADVISE ON SCHEDULING. PT PREFERS TO SEE AN MD IF POSSIBLE.    THANK YOU.

## 2021-01-25 NOTE — TELEPHONE ENCOUNTER
PATIENT CALLED TO CHECK THE STATUS OF PREV REQUEST FOR AN APPT. NO PREV CALLBACK.     PATIENT CAN BE CONTACTED [phone] WITH FURTHER ASSISTANCE

## 2021-02-03 ENCOUNTER — OFFICE VISIT (OUTPATIENT)
Dept: NEUROSURGERY | Facility: CLINIC | Age: 71
End: 2021-02-03

## 2021-02-03 VITALS
DIASTOLIC BLOOD PRESSURE: 66 MMHG | SYSTOLIC BLOOD PRESSURE: 134 MMHG | HEIGHT: 67 IN | BODY MASS INDEX: 29.19 KG/M2 | HEART RATE: 59 BPM | WEIGHT: 186 LBS

## 2021-02-03 DIAGNOSIS — M54.2 NECK PAIN: Primary | ICD-10-CM

## 2021-02-03 PROCEDURE — 99214 OFFICE O/P EST MOD 30 MIN: CPT | Performed by: NEUROLOGICAL SURGERY

## 2021-02-03 NOTE — PROGRESS NOTES
"Subjective   History of Present Illness: Kaveh Vences is a 70 y.o. male with chief complaint of neck and cervical pain.  He is a new patient is being seen in our clinic on 2/3/2021.  Patient is describes cervical pain in suboccipital orientation with tightness in the back of his neck and head.  Throughout the day the pain may be about 2-3 over 10 but at night while he is lying on his back the pain increases to 7 8 out of 10 and interferes with his sleep.  He has had the discomfort for the last 2 months.  He has no radiating to pain in his arms or his hand there is no numbness or weakness he has no difficulties with his legs there is been no reports of bowel or bladder incontinence.  Past medical history is noteworthy for hypertension, diabetes and coronary artery disease and the patient currently is on Plavix.    History of Present Illness    The following portions of the patient's history were reviewed and updated as appropriate: allergies, current medications, past family history, past medical history, past social history, past surgical history and problem list.    Review of Systems   Constitutional: Negative.    HENT: Negative.    Respiratory: Negative.    Cardiovascular: Negative.    Musculoskeletal: Positive for neck pain.   Neurological: Negative.        Objective     ./66 (BP Location: Left arm, Patient Position: Sitting, Cuff Size: Adult)   Pulse 59   Ht 170.2 cm (67\")   Wt 84.4 kg (186 lb)   BMI 29.13 kg/m²    Body mass index is 29.13 kg/m².      Physical Exam  Constitutional:       Appearance: Normal appearance.   HENT:      Head: Normocephalic and atraumatic.   Eyes:      Pupils: Pupils are equal, round, and reactive to light.   Neck:      Musculoskeletal: Normal range of motion. Muscular tenderness present.      Comments: Negative Lhermitte sign  Negative Brudzinski sign  Negative Spurling sign    Point spasm in the suboccipital region and upper posterior cervical spine muscular " distribution.  Pulmonary:      Effort: Pulmonary effort is normal.      Breath sounds: Normal breath sounds.   Abdominal:      Palpations: Abdomen is soft.   Musculoskeletal: Normal range of motion.   Neurological:      Mental Status: He is alert and oriented to person, place, and time.   Psychiatric:         Speech: Speech normal.       Neurologic Exam     Mental Status   Oriented to person, place, and time.   Attention: normal. Concentration: normal.   Speech: speech is normal   Level of consciousness: alert    Cranial Nerves     CN III, IV, VI   Pupils are equal, round, and reactive to light.    Motor Exam 5/5 throughout with negative pronator drift negative Alvaro sign.     Sensory Exam   Primary cortical sensory modalities intact throughout.             Assessment/Plan   Independent Review of Radiographic Studies:      I personally reviewed the images from the following studies.    No imaging studies for review    Medical Decision Making:      EMR, laboratory values, imaging studies have been reviewed.  The patient's has been seen and evaluated with clinical summary and recommendations provided.  Patient has musculoskeletal pain in his neck with some spasm with more probable diagnosis of mild cervical fasciitis.  Patient is neurologically intact, without radiculopathy or myelopathy.  Recommend follow-up with physical therapy which can be quite helpful but also an in order to complete his evaluation imaging studies cervical MRI and x-rays of the C-spine with flexion and extension will be obtained with follow-up in our clinic.    Procedures      Diagnoses and all orders for this visit:    1. Neck pain (Primary)  -     MRI Cervical Spine Without Contrast; Future  -     XR spine cervical ap and lat w flex and ext; Future  -     Ambulatory Referral to Physical Therapy Evaluate and treat      Return in about 3 weeks (around 2/24/2021).    This patient was examined wearing appropriate personal protective equipment.      Patient is a current tobacco user. Discussed cessation/avoidance of tobacco products.    Smoking increases the risk of heart disease, lung disease, vascular disease, stroke, and cancer. If you smoke, STOP!    For more information:  Quit Now [place]  1-800-QUIT-NOW  [email or web address]://[email or web address]      I did discuss a low-salt diet and exercise to improve BP in addition to taking presribed medications.    Patient's BMI is above goal.  I discussed healthy, low-fat diet and exercise to improve overall health and wellbeing.             Etienne Mckinnon MD  02/03/21  14:13 EST

## 2021-02-11 ENCOUNTER — HOSPITAL ENCOUNTER (OUTPATIENT)
Dept: MRI IMAGING | Facility: HOSPITAL | Age: 71
Discharge: HOME | End: 2021-02-11
Admitting: SURGERY

## 2021-02-11 DIAGNOSIS — M54.2 NECK PAIN: ICD-10-CM

## 2021-02-11 PROCEDURE — 72141 MRI NECK SPINE W/O DYE: CPT

## 2021-02-18 ENCOUNTER — TREATMENT (OUTPATIENT)
Dept: PHYSICAL THERAPY | Facility: CLINIC | Age: 71
End: 2021-02-18

## 2021-02-18 DIAGNOSIS — Z91.89 AT RISK FOR IMPAIRMENT OF SLEEP: ICD-10-CM

## 2021-02-18 DIAGNOSIS — M54.2 CERVICALGIA: Primary | ICD-10-CM

## 2021-02-18 PROCEDURE — 97530 THERAPEUTIC ACTIVITIES: CPT | Performed by: PHYSICAL THERAPIST

## 2021-02-18 PROCEDURE — 97161 PT EVAL LOW COMPLEX 20 MIN: CPT | Performed by: PHYSICAL THERAPIST

## 2021-02-18 PROCEDURE — 97110 THERAPEUTIC EXERCISES: CPT | Performed by: PHYSICAL THERAPIST

## 2021-02-18 NOTE — PROGRESS NOTES
Physical Therapy Initial Evaluation and Plan of Care    Patient: Kaveh Vences   : 1950  Diagnosis/ICD-10 Code:  Cervicalgia [M54.2]  Referring practitioner: Etienne Mckinnon MD  Date of Initial Visit: 2021  Today's Date: 2021  Patient seen for 1 sessions           Subjective Questionnaire: NDI: 12%      Subjective Evaluation    History of Present Illness  Mechanism of injury: Pt reports he has had neck pain for about 2 months.  He does have a history of neck injury 30 years ago.  He has not had any issues all of these years.  He felt pain in his neck at the time that he lifted his wife up into the bed just playing around.  He has a hard time lifting his head off the pillow and sitting straight up in the bed from lying down. He has most pain lying down or lifting.  He feels better with sitting or standing.  He has no pain during the day usually.  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      Patient Occupation: retired Pain  Current pain ratin  At best pain ratin  At worst pain ratin  Quality: dull ache and pressure  Relieving factors: change in position  Aggravating factors: prolonged positioning and lifting (lying down)  Progression: no change    Social Support  Lives in: one-story house  Lives with: spouse    Diagnostic Tests  Abnormal MRI: no results yet.    Patient Goals  Patient goals for therapy: decreased pain, increased motion, independence with ADLs/IADLs, return to sport/leisure activities and return to work  Patient goal: do leisure and work tasks without pain         PRECAUTIONS: HTN, CAD, sleep apnea,     Objective        Special Questions  Patient is experiencing night pain and disturbed sleep.       Postural Observations  Seated posture: fair  Standing posture: fair  Correction of posture: has no consistent effect        Palpation     Additional Palpation Details  TTP: On spinous process  of C7, T1,2    Neurological Testing     Sensation   Cervical/Thoracic   Left   Intact: light touch    Right   Intact: light touch    Reflexes   Left   Biceps (C5/C6): normal (2+)  Brachioradialis (C6): normal (2+)  Triceps (C7): normal (2+)    Right   Biceps (C5/C6): normal (2+)  Brachioradialis (C6): normal (2+)  Triceps (C7): normal (2+)    Active Range of Motion   Cervical/Thoracic Spine   Cervical    Flexion: WFL  Extension: Neck active extension: moderate limit.   Left lateral flexion: Neck active lateral bend left: moderate limit. with pain  Right lateral flexion: Neck active lateral bend right: minimal limit. with pain  Left rotation: Neck active rotation left: moderate limit. with pain  Right rotation: Neck active rotation right: moderate limit. with pain    Additional Active Range of Motion Details  RLF:  Pain on right    LLF:  Pulling on right     Strength/Myotome Testing   Cervical Spine     Left   Normal strength    Right   Normal strength    Additional Strength Details  Including  bilaterally    Tests   Cervical     Left   Negative Spurling's sign.     Right   Positive Spurling's sign.     Additional Tests Details  Man distraction:  Dec pain  Man compression:  Inc pain    PIVM:  Dec downglides throughout bilaterally          Assessment & Plan     Assessment  Impairments: abnormal muscle tone, abnormal or restricted ROM, activity intolerance, lacks appropriate home exercise program and pain with function  Assessment details: Pt is a 71 y/o male with a dx of cervical pain onset 2 months ago.  He has a hard time lifting his head off the pillow and sitting straight up in the bed from lying down. He has most pain lying down or lifting.  He feels better with sitting or standing.  He has no pain during the day usually.  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  Pt exhibits the above impairments and functional limitations and would benefit from skilled PT to address those areas and maximize  function.  Functional Limitations: sleeping, uncomfortable because of pain and moving in bed  Goals  Plan Goals: STGs:  6 weeks  1.  Pt to tolerate initial HEP without inc pain.  2.  Pt to tolerate advancement of HEP without inc pain.  3.  Pt to report pain has decreased by at least 25%.  4.  Pt to improve  AROM of cervical extension and bilateral rotation  to at least minimal limitation.  5.  Pt to sit with proper posture without cues required.        LTGs:  By DC  1.  Pt to be (I) with HEP.  2.  Pt to report pain has decreased by at least 80% to allow better tolerance to and performance of sleep quality and comfort while lying down in any position.  3.  Pt to improve AROM of cervical spine all planes to min limit to WFL to allow better sleep quality.  4.  Pt to improve head and neck position while sleeping via trying different pillows at home and reporting improvement in pain.  5.  Pt to exhibit improved function as indicated by improved score on NDI vs score at evaluation.    6.  Pt to demonstrate (I) and appropriate use of body mechanics for daily home and/or work tasks.    Plan  Therapy options: will be seen for skilled physical therapy services  Planned modality interventions: cryotherapy, high voltage pulsed current (pain management), thermotherapy (hydrocollator packs) and ultrasound  Planned therapy interventions: abdominal trunk stabilization, body mechanics training, functional ROM exercises, home exercise program, manual therapy, motor coordination training, neuromuscular re-education, postural training, soft tissue mobilization, spinal/joint mobilization, strengthening, stretching and therapeutic activities  Frequency: 2x week  Plan details: 30 visits        See flowsheet for treatment details.    History # of Personal Factors and/or Comorbidities: LOW (0)  Examination of Body System(s): # of elements: LOW (1-2)  Clinical Presentation: STABLE   Clinical Decision Making: LOW       Timed:         Manual  Therapy:         mins  49377;     Therapeutic Exercise:  12       mins  19869;     Neuromuscular Summer:        mins  44985;    Therapeutic Activity:   12     mins  30548;     Gait Training:           mins  06529;     Ultrasound:          mins  14735;    Ionto:                                   mins   16050  Self Care:                            mins   95415  Canalith Repos:                  mins   04447      Un-Timed:  Electrical Stimulation:         mins  05470 ( );  Traction          mins 05600  Low Eval   20       Mins  89410  Mod Eval          Mins  40524  High Eval                            Mins  93992  Re-Eval                               mins  60195        Timed Treatment: 24     mins   Total Treatment:  44     mins    PT SIGNATURE: Yanna Newton PT   DATE TREATMENT INITIATED: 2/18/2021    Initial Certification  Certification Period: 5/19/2021  I certify that the therapy services are furnished while this patient is under my care.  The services outlined above are required by this patient, and will be reviewed every 90 days.     PHYSICIAN: Etienne Mckinnon MD      DATE:     Please sign and return via fax to [phone].. Thank you, [facility].

## 2021-02-24 ENCOUNTER — TREATMENT (OUTPATIENT)
Dept: PHYSICAL THERAPY | Facility: CLINIC | Age: 71
End: 2021-02-24

## 2021-02-24 DIAGNOSIS — Z91.89 AT RISK FOR IMPAIRMENT OF SLEEP: ICD-10-CM

## 2021-02-24 DIAGNOSIS — M54.2 CERVICALGIA: Primary | ICD-10-CM

## 2021-02-24 PROCEDURE — 97110 THERAPEUTIC EXERCISES: CPT | Performed by: PHYSICAL THERAPIST

## 2021-02-24 PROCEDURE — 97140 MANUAL THERAPY 1/> REGIONS: CPT | Performed by: PHYSICAL THERAPIST

## 2021-02-24 NOTE — PROGRESS NOTES
Physical Therapy Daily Progress Note    Patient: Kaveh Vences   : 1950  Referring practitioner: Etienne Mckinnon MD  Today's Date: 2021    VISIT#: 2    Subjective Evaluation    History of Present Illness  Mechanism of injury: Pt reports he is about the same as before with some popping noted.  He has been doing the exercises and he felt inc pain after doing the one pushing on the pillow.    Pain  Current pain ratin         Objective     See Exercise, Manual, and Modality Logs for complete treatment.     Review of HEP from last session; no cues required;  Added to HEP today    Patient Education:  Try roll in regular foam pillow at home trial of pool noodle here today    Assessment & Plan     Assessment  Assessment details: Pt laila well today with reporting neck feeling looser than before treatment; no change in pain for session today          Progress per Plan of Care            Timed:         Manual Therapy:  20       mins  03857;     Therapeutic Exercise:  25       mins  29005;     Neuromuscular Summer:        mins  15206;    Therapeutic Activity:          mins  53357;     Gait Training:           mins  57828;     Ultrasound:          mins  15729;    Ionto                                   mins   19212  Self Care                            mins   89232  Canalith Repos                   mins  4209    Un-Timed:  Electrical Stimulation:         mins  72590 ( );  Traction          mins 47866  Low Eval          Mins  63809  Mod Eval          Mins  83455  High Eval                            Mins  01288  Re-Eval                               mins  15475    Timed Treatment: 45     mins   Total Treatment:   45     mins    Yanna Newton PT  Physical Therapist

## 2021-03-01 ENCOUNTER — TELEPHONE (OUTPATIENT)
Dept: PHYSICAL THERAPY | Facility: CLINIC | Age: 71
End: 2021-03-01

## 2021-03-02 ENCOUNTER — HOSPITAL ENCOUNTER (OUTPATIENT)
Dept: CARDIOLOGY | Facility: HOSPITAL | Age: 71
Discharge: HOME | End: 2021-03-02

## 2021-03-02 VITALS
WEIGHT: 186 LBS | HEART RATE: 53 BPM | BODY MASS INDEX: 29.19 KG/M2 | DIASTOLIC BLOOD PRESSURE: 75 MMHG | HEIGHT: 67 IN | SYSTOLIC BLOOD PRESSURE: 125 MMHG

## 2021-03-02 DIAGNOSIS — I25.10 CORONARY ARTERY DISEASE INVOLVING NATIVE CORONARY ARTERY OF NATIVE HEART WITHOUT ANGINA PECTORIS: ICD-10-CM

## 2021-03-02 DIAGNOSIS — I42.0 DILATED CARDIOMYOPATHY (HCC): ICD-10-CM

## 2021-03-02 DIAGNOSIS — I38 VALVULAR HEART DISEASE: ICD-10-CM

## 2021-03-02 DIAGNOSIS — I25.118 CORONARY ARTERY DISEASE OF NATIVE ARTERY OF NATIVE HEART WITH STABLE ANGINA PECTORIS (HCC): ICD-10-CM

## 2021-03-02 DIAGNOSIS — I27.20 PULMONARY HYPERTENSION (HCC): ICD-10-CM

## 2021-03-02 DIAGNOSIS — R09.89 BILATERAL CAROTID BRUITS: ICD-10-CM

## 2021-03-02 PROCEDURE — 93880 EXTRACRANIAL BILAT STUDY: CPT | Performed by: INTERNAL MEDICINE

## 2021-03-02 PROCEDURE — 93306 TTE W/DOPPLER COMPLETE: CPT | Performed by: INTERNAL MEDICINE

## 2021-03-02 PROCEDURE — 93306 TTE W/DOPPLER COMPLETE: CPT

## 2021-03-02 PROCEDURE — 93880 EXTRACRANIAL BILAT STUDY: CPT

## 2021-03-03 ENCOUNTER — OFFICE VISIT (OUTPATIENT)
Dept: NEUROSURGERY | Facility: CLINIC | Age: 71
End: 2021-03-03

## 2021-03-03 ENCOUNTER — HOSPITAL ENCOUNTER (OUTPATIENT)
Dept: GENERAL RADIOLOGY | Facility: HOSPITAL | Age: 71
Discharge: HOME | End: 2021-03-03
Admitting: SURGERY

## 2021-03-03 VITALS
SYSTOLIC BLOOD PRESSURE: 124 MMHG | WEIGHT: 185 LBS | HEIGHT: 67 IN | HEART RATE: 60 BPM | BODY MASS INDEX: 29.03 KG/M2 | DIASTOLIC BLOOD PRESSURE: 62 MMHG

## 2021-03-03 DIAGNOSIS — M54.2 NECK PAIN: ICD-10-CM

## 2021-03-03 DIAGNOSIS — M47.812 CERVICAL SPONDYLOSIS WITHOUT MYELOPATHY: ICD-10-CM

## 2021-03-03 DIAGNOSIS — M54.2 NECK PAIN, MUSCULOSKELETAL: Primary | ICD-10-CM

## 2021-03-03 PROCEDURE — 72050 X-RAY EXAM NECK SPINE 4/5VWS: CPT

## 2021-03-03 PROCEDURE — 99214 OFFICE O/P EST MOD 30 MIN: CPT | Performed by: SURGERY

## 2021-03-03 NOTE — PROGRESS NOTES
"Subjective   History of Present Illness: Kaveh Vences is a 70 y.o. male seen in follow-up in our neurosurgery clinic on 3/3/2021. Patient is describes cervical pain in suboccipital orientation with tightness in the back of his neck and head.  Throughout the day the pain may be about 2-3 over 10 but at night while he is lying on his back the pain increases to 7 8 out of 10 and interferes with his sleep.  He has had the discomfort for the last 2 months.  He has no radiating to pain in his arms or his hand there is no numbness or weakness he has no difficulties with his legs there is been no reports of bowel or bladder incontinence.  Past medical history is noteworthy for hypertension, diabetes and coronary artery disease and the patient currently is on Plavix.  With his last evaluation the patient had been referred to physical therapy and x-rays of the cervical spine were ordered as well as cervical MRI.  Patient has good appoint with pain management next week.  Patient says that the physical therapy loosen his neck up a little bit but did not provide any lasting relief.  The cervical spine film obtained 2/3/2021 is negative for instability.  The cervical MRI obtained 2/3/2021 is revealing for mild multilevel degenerative disc disease at C5-6 and C6-7 with mild foraminal narrowing.     History of Present Illness    The following portions of the patient's history were reviewed and updated as appropriate: allergies, current medications, past family history, past medical history, past social history, past surgical history and problem list.    Review of Systems   Constitutional: Negative.    HENT: Negative.    Cardiovascular: Negative.    Musculoskeletal: Positive for neck pain.   Neurological: Negative.        Objective     ./62 (BP Location: Left arm, Patient Position: Sitting, Cuff Size: Adult)   Pulse 60   Ht 170.2 cm (67\")   Wt 83.9 kg (185 lb)   BMI 28.98 kg/m²    Body mass index is 28.98 " kg/m².      Physical Exam  HENT:      Head: Normocephalic and atraumatic.   Eyes:      Extraocular Movements: Extraocular movements intact.      Pupils: Pupils are equal, round, and reactive to light.   Neck:      Musculoskeletal: Muscular tenderness present.   Pulmonary:      Effort: Pulmonary effort is normal.      Breath sounds: Normal breath sounds.   Abdominal:      Palpations: Abdomen is soft.   Musculoskeletal: Normal range of motion.   Neurological:      General: No focal deficit present.      Mental Status: He is alert and oriented to person, place, and time.   Psychiatric:         Speech: Speech normal.       Neurologic Exam     Mental Status   Oriented to person, place, and time.   Attention: normal.   Speech: speech is normal   Level of consciousness: alert    Cranial Nerves     CN III, IV, VI   Pupils are equal, round, and reactive to light.    Motor Exam 5/5 strength in upper and lower extremities with normal bulk and tone.  Negative Alvaro sign.  Negative pronator drift.     Sensory Exam   Primary and cortical sensory modalities remain intact         Assessment/Plan   Independent Review of Radiographic Studies:      I personally reviewed the images from the following studies.    DATE OF EXAM:   3/3/2021 9:27 AM     PROCEDURE:   XR SPINE CERVICAL AP AND LAT W FLEX AND EXT-     INDICATIONS:   neck pain; M54.2-Cervicalgia     COMPARISON:  Cervical spine MRI 02/11/2021     TECHNIQUE:   7 views     FINDINGS:   Cervical spine seen to the top of T1. There is straightening normal  cervical spine lordosis. With flexion and extension there is limited  mobility but no significant findings of instability. There is moderate  disc height loss at C5-C6 and C6-C7. There is mild disc height loss at  C4-C5. The prevertebral soft tissues are normal. No osseous neural  foraminal narrowing is present. There is uncovertebral joint  degenerative change at C5-C6 C6-C7. The lung apices are clear. The  lateral masses of C1  align with the body of C2. There is excessive  elongated ossification of stylohyoid ligaments on the right measuring up  to at least 8.2 cm.     IMPRESSION:  IMPRESSION :   1.No findings of instability with flexion or extension.  2.Moderate degenerative disc disease C5-C6 and C6-C7.  Study Result    MRI CERVICAL SPINE WO CONTRAST-     DATE OF EXAM: 2/11/2021 9:25 AM     INDICATION: Neck pain; M54.2-Cervicalgia. Acute on chronic neck pain  following injury 30 years ago.     COMPARISON: None available     TECHNIQUE: Multiplanar multisequence images of the cervical spine were  performed according to routine cervical spine MRI protocol.     FINDINGS:   There is straightening of the normal cervical lordosis. There is no  evidence of subluxation. The vertebral body heights are maintained  without evidence of fracture. There is no evidence of marrow  infiltrative process. The spinal cord is normal in caliber without  evidence of intrinsic cord signal abnormality or intramedullary mass.  The craniocervical junction appears unremarkable. The spinous processes  appear intact. The paraspinal musculature appears symmetric. The  prevertebral soft tissues appear normal in thickness.     Level by level analysis:  C1-C2: The anterior C1-C2 articulation is preserved. The lateral masses  of C1 are symmetrically aligned on the occipital condyles. There is no  significant degenerative pannus formation.     C2-C3: There is no disc bulge or spinal canal stenosis. There is mild  left-sided facet arthropathy without neuroforaminal narrowing. No  significant uncovertebral spurring.     C3-C4: There is a minimal broad-based posterior disc bulge. There is no  spinal canal stenosis. There is mild bilateral uncovertebral spurring  contributing to mild right neuroforaminal narrowing. There is mild  left-sided facet arthropathy.     C4-C5: There is a minimal posterior disc bulge without spinal canal  stenosis. There is bilateral facet arthropathy  without neuroforaminal  narrowing. No significant uncovertebral spurring.     C5-C6: There is significant degenerative disc height loss with posterior  disc osteophyte complex asymmetric to the right which causes mild spinal  canal stenosis and partial effacement of the ventral CSF space. There is  no cord compression. There is bilateral facet arthropathy, left greater  than right. There is mild left-sided neuroforaminal narrowing secondary  to uncovertebral and facet arthropathy. There is no definite right-sided  neuroforaminal narrowing with evaluation somewhat limited due to  artifact.     C6-C7: There is a broad-based posterior disc osteophyte complex without  spinal canal stenosis. There is bilateral uncovertebral spurring with  moderate right and mild left neuroforaminal narrowing secondary to  uncovertebral spurring.     C7-T1: There is no disc bulge, spinal canal stenosis, or neuroforaminal  narrowing.     IMPRESSION:  1. Multilevel degenerative disc disease most notably at C5-C6 and C6-C7  with mild spinal canal stenosis.  2. Moderate right-sided neuroforaminal narrowing at C6-C7 with mild  left-sided neuroforaminal narrowing.  3. Lesser degrees of degenerative changes as detailed above.            Medical Decision Making:      EMR and imaging studies have been reviewed.  Patient has been seen and evaluated.  Clinical summary results and recommendations have been discussed with patient's.  Probable cervical ham fasciitis.  Recommend continuation of PT and follow-up with pain management.    Procedures      Diagnosis consistent with mild fasciitis cervical/musculoskeletal pain of cervical spine.  Mild cervical spondylosis.  As needed follow-up.    This patient was examined wearing appropriate personal protective equipment.     Patient is a current tobacco user.  Recommendations for cessation/avoidance of tobacco products.    Smoking increases the risk of heart disease, lung disease, vascular disease, stroke,  and cancer. If you smoke, STOP!    For more information:  Quit Now [place]  1-800-QUIT-NOW  https://[email or web address]      Patient's blood pressure was reviewed.  Recommendations for  a low-salt diet and exercise to maintain/improve BP in addition to taking any presribed medications.    Patient's BMI is above goal.  Recommendations for initiating a healthy, low-fat diet and exercise to improve BMI, overall health and wellbeing.             Etienne Mckinnon MD  03/03/21  13:41 EST

## 2021-03-04 ENCOUNTER — TREATMENT (OUTPATIENT)
Dept: PHYSICAL THERAPY | Facility: CLINIC | Age: 71
End: 2021-03-04

## 2021-03-04 DIAGNOSIS — M54.2 CERVICALGIA: Primary | ICD-10-CM

## 2021-03-04 PROCEDURE — 97530 THERAPEUTIC ACTIVITIES: CPT | Performed by: PHYSICAL THERAPIST

## 2021-03-04 PROCEDURE — 97140 MANUAL THERAPY 1/> REGIONS: CPT | Performed by: PHYSICAL THERAPIST

## 2021-03-04 PROCEDURE — 97110 THERAPEUTIC EXERCISES: CPT | Performed by: PHYSICAL THERAPIST

## 2021-03-04 NOTE — PROGRESS NOTES
Physical Therapy Daily Progress Note    Patient: Kaveh Vences   : 1950  Referring practitioner: Etienne Mckinnon MD  Today's Date: 3/4/2021    VISIT#: 3    Subjective Evaluation    History of Present Illness  Mechanism of injury: He feels a bit better but still with night pain.  He has been in pain management for his LB and he is supposed to go for his neck too.      Pain  Current pain ratin  Location: last night 5/10         Objective     See Exercise, Manual, and Modality Logs for complete treatment.     ACT:  demo'd use of wedge for sleeping and reducing pressure on cervical spine; reviewed use of pillow with roll    Patient Education:    Assessment & Plan     Assessment  Assessment details: Pt is slowly progressing with dec pain and short term inc in ROM;  Still with most pain at night      Progress per Plan of Care        Timed:         Manual Therapy: 15        mins  17328;     Therapeutic Exercise:  20       mins  54978;     Neuromuscular Summer:        mins  14975;    Therapeutic Activity:   10       mins  61780;     Gait Training:           mins  06166;     Ultrasound:          mins  49575;    Ionto                                   mins   90935  Self Care                            mins   34496  Canalith Repos                   mins  4209    Un-Timed:  Electrical Stimulation:         mins  56182 ( );  Traction          mins 72165  Low Eval          Mins  22599  Mod Eval          Mins  54503  High Eval                            Mins  99003  Re-Eval                               mins  58798    Timed Treatment: 45     mins   Total Treatment:   45     mins    Yanna Newton PT  Physical Therapist

## 2021-03-05 LAB
ASCENDING AORTA: 4.2 CM
BH CV ECHO MEAS - ACS: 2.5 CM
BH CV ECHO MEAS - AI DEC SLOPE: 199.2 CM/SEC^2
BH CV ECHO MEAS - AI DEC TIME: 2.2 SEC
BH CV ECHO MEAS - AI MAX PG: 76.6 MMHG
BH CV ECHO MEAS - AI MAX VEL: 437.6 CM/SEC
BH CV ECHO MEAS - AI P1/2T: 643.5 MSEC
BH CV ECHO MEAS - AO MAX PG (FULL): 4.7 MMHG
BH CV ECHO MEAS - AO MAX PG: 8.6 MMHG
BH CV ECHO MEAS - AO MEAN PG (FULL): 2.4 MMHG
BH CV ECHO MEAS - AO MEAN PG: 4.6 MMHG
BH CV ECHO MEAS - AO ROOT AREA (BSA CORRECTED): 2.2
BH CV ECHO MEAS - AO ROOT AREA: 14.3 CM^2
BH CV ECHO MEAS - AO ROOT DIAM: 4.3 CM
BH CV ECHO MEAS - AO V2 MAX: 146.3 CM/SEC
BH CV ECHO MEAS - AO V2 MEAN: 100.9 CM/SEC
BH CV ECHO MEAS - AO V2 VTI: 29.6 CM
BH CV ECHO MEAS - ASC AORTA: 4.2 CM
BH CV ECHO MEAS - AVA(I,A): 3.2 CM^2
BH CV ECHO MEAS - AVA(I,D): 3.2 CM^2
BH CV ECHO MEAS - AVA(V,A): 2.9 CM^2
BH CV ECHO MEAS - AVA(V,D): 2.9 CM^2
BH CV ECHO MEAS - BSA(HAYCOCK): 2 M^2
BH CV ECHO MEAS - BSA: 2 M^2
BH CV ECHO MEAS - BZI_BMI: 29.1 KILOGRAMS/M^2
BH CV ECHO MEAS - BZI_METRIC_HEIGHT: 170.2 CM
BH CV ECHO MEAS - BZI_METRIC_WEIGHT: 84.4 KG
BH CV ECHO MEAS - EDV(CUBED): 169.4 ML
BH CV ECHO MEAS - EDV(MOD-SP2): 144 ML
BH CV ECHO MEAS - EDV(MOD-SP4): 148.6 ML
BH CV ECHO MEAS - EDV(TEICH): 149.5 ML
BH CV ECHO MEAS - EF(CUBED): 44 %
BH CV ECHO MEAS - EF(MOD-BP): 50 %
BH CV ECHO MEAS - EF(MOD-SP2): 49.6 %
BH CV ECHO MEAS - EF(MOD-SP4): 44.8 %
BH CV ECHO MEAS - EF(TEICH): 36.2 %
BH CV ECHO MEAS - ESV(CUBED): 94.9 ML
BH CV ECHO MEAS - ESV(MOD-SP2): 72.6 ML
BH CV ECHO MEAS - ESV(MOD-SP4): 82.1 ML
BH CV ECHO MEAS - ESV(TEICH): 95.4 ML
BH CV ECHO MEAS - FS: 17.6 %
BH CV ECHO MEAS - IVS/LVPW: 1.5
BH CV ECHO MEAS - IVSD: 1.6 CM
BH CV ECHO MEAS - LA DIMENSION(2D): 4.4 CM
BH CV ECHO MEAS - LA DIMENSION: 4.4 CM
BH CV ECHO MEAS - LA/AO: 1
BH CV ECHO MEAS - LAT PEAK E' VEL: 6 CM/SEC
BH CV ECHO MEAS - LV DIASTOLIC VOL/BSA (35-75): 75.8 ML/M^2
BH CV ECHO MEAS - LV IVRT: 0.13 SEC
BH CV ECHO MEAS - LV MASS(C)D: 323.2 GRAMS
BH CV ECHO MEAS - LV MASS(C)DI: 164.8 GRAMS/M^2
BH CV ECHO MEAS - LV MAX PG: 3.8 MMHG
BH CV ECHO MEAS - LV MEAN PG: 2.1 MMHG
BH CV ECHO MEAS - LV SYSTOLIC VOL/BSA (12-30): 41.9 ML/M^2
BH CV ECHO MEAS - LV V1 MAX: 97.9 CM/SEC
BH CV ECHO MEAS - LV V1 MEAN: 69.5 CM/SEC
BH CV ECHO MEAS - LV V1 VTI: 22 CM
BH CV ECHO MEAS - LVIDD: 5.5 CM
BH CV ECHO MEAS - LVIDS: 4.6 CM
BH CV ECHO MEAS - LVOT AREA: 4.4 CM^2
BH CV ECHO MEAS - LVOT DIAM: 2.4 CM
BH CV ECHO MEAS - LVPWD: 1.1 CM
BH CV ECHO MEAS - MED PEAK E' VEL: 6 CM/SEC
BH CV ECHO MEAS - MV A MAX VEL: 65.1 CM/SEC
BH CV ECHO MEAS - MV DEC SLOPE: 202.2 CM/SEC^2
BH CV ECHO MEAS - MV DEC TIME: 0.29 SEC
BH CV ECHO MEAS - MV E MAX VEL: 57.9 CM/SEC
BH CV ECHO MEAS - MV E/A: 0.89
BH CV ECHO MEAS - MV MAX PG: 3.6 MMHG
BH CV ECHO MEAS - MV MEAN PG: 1 MMHG
BH CV ECHO MEAS - MV P1/2T: 58 MSEC
BH CV ECHO MEAS - MV V2 MAX: 94.3 CM/SEC
BH CV ECHO MEAS - MV V2 MEAN: 44.6 CM/SEC
BH CV ECHO MEAS - MV V2 VTI: 26.2 CM
BH CV ECHO MEAS - MVA(P1/2T): 3.8 CM2
BH CV ECHO MEAS - MVA(VTI): 3.7 CM^2
BH CV ECHO MEAS - PA ACC SLOPE: 761 CM/SEC2
BH CV ECHO MEAS - PA ACC TIME: 0.1 SEC
BH CV ECHO MEAS - PA MAX PG (FULL): 1.5 MMHG
BH CV ECHO MEAS - PA MAX PG: 3.7 MMHG
BH CV ECHO MEAS - PA MEAN PG (FULL): 0.53 MMHG
BH CV ECHO MEAS - PA MEAN PG: 1.4 MMHG
BH CV ECHO MEAS - PA PR(ACCEL): 35.2 MMHG
BH CV ECHO MEAS - PA V2 MAX: 94.5 CM/SEC
BH CV ECHO MEAS - PA V2 MEAN: 52.8 CM/SEC
BH CV ECHO MEAS - PA V2 VTI: 18.4 CM
BH CV ECHO MEAS - PAPD(PI EDV): 5 MMHG
BH CV ECHO MEAS - PI END-D VEL: 73.8 CM/SEC
BH CV ECHO MEAS - PI MAX PG: 19.3 MMHG
BH CV ECHO MEAS - PI MAX VEL: 219.4 CM/SEC
BH CV ECHO MEAS - PULM A REVS DUR: 0.12 SEC
BH CV ECHO MEAS - PULM A REVS VEL: 23.7 CM/SEC
BH CV ECHO MEAS - PULM DIAS VEL: 48 CM/SEC
BH CV ECHO MEAS - PULM S/D: 0.99
BH CV ECHO MEAS - PULM SYS VEL: 47.4 CM/SEC
BH CV ECHO MEAS - PVA(I,A): 11.9 CM^2
BH CV ECHO MEAS - PVA(I,D): 11.9 CM^2
BH CV ECHO MEAS - PVA(V,A): 5.2 CM^2
BH CV ECHO MEAS - PVA(V,D): 5.2 CM^2
BH CV ECHO MEAS - QP/QS: 2.3
BH CV ECHO MEAS - RAP SYSTOLE: 3 MMHG
BH CV ECHO MEAS - RV MAX PG: 2.3 MMHG
BH CV ECHO MEAS - RV MEAN PG: 0.85 MMHG
BH CV ECHO MEAS - RV V1 MAX: 75.4 CM/SEC
BH CV ECHO MEAS - RV V1 MEAN: 38.1 CM/SEC
BH CV ECHO MEAS - RV V1 VTI: 34 CM
BH CV ECHO MEAS - RVOT AREA: 6.5 CM^2
BH CV ECHO MEAS - RVOT DIAM: 2.9 CM
BH CV ECHO MEAS - RVSP: 32.4 MMHG
BH CV ECHO MEAS - SI(AO): 216.2 ML/M^2
BH CV ECHO MEAS - SI(CUBED): 38 ML/M^2
BH CV ECHO MEAS - SI(LVOT): 48.9 ML/M^2
BH CV ECHO MEAS - SI(MOD-SP2): 36.4 ML/M^2
BH CV ECHO MEAS - SI(MOD-SP4): 33.9 ML/M^2
BH CV ECHO MEAS - SI(TEICH): 27.6 ML/M^2
BH CV ECHO MEAS - SUP REN AO DIAM: 2.6 CM
BH CV ECHO MEAS - SV(AO): 423.9 ML
BH CV ECHO MEAS - SV(CUBED): 74.5 ML
BH CV ECHO MEAS - SV(LVOT): 95.8 ML
BH CV ECHO MEAS - SV(MOD-SP2): 71.4 ML
BH CV ECHO MEAS - SV(MOD-SP4): 66.5 ML
BH CV ECHO MEAS - SV(RVOT): 219.8 ML
BH CV ECHO MEAS - SV(TEICH): 54.1 ML
BH CV ECHO MEAS - TAPSE (>1.6): 2.1 CM
BH CV ECHO MEAS - TR MAX PG: 29 MMHG
BH CV ECHO MEAS - TR MAX VEL: 270.7 CM/SEC
BH CV ECHO MEASUREMENTS AVERAGE E/E' RATIO: 9.65
BH CV VAS BP LEFT ARM: NORMAL MMHG
BH CV VAS BP LEFT ARM: NORMAL MMHG
BH CV VAS BP RIGHT ARM: NORMAL MMHG
BH CV XLRA - RV BASE: 4.5 CM
BH CV XLRA - RV MID: 3.3 CM
BH CV XLRA - TDI S': 13 CM/SEC
BH CV XLRA MEAS CAROTID RIGHT ICA/CCA DIASTOLIC RATIO: 2.39
BH CV XLRA MEAS LEFT BULB EDV: 12.2 CM/SEC
BH CV XLRA MEAS LEFT BULB PSV: 58.6 CM/SEC
BH CV XLRA MEAS LEFT DIST CCA EDV: 11.9 CM/SEC
BH CV XLRA MEAS LEFT DIST CCA PSV: 47.6 CM/SEC
BH CV XLRA MEAS LEFT DIST ICA EDV: 19.6 CM/SEC
BH CV XLRA MEAS LEFT DIST ICA PSV: 63.8 CM/SEC
BH CV XLRA MEAS LEFT ICA/CCA DIASTOLIC RATIO: 2.1
BH CV XLRA MEAS LEFT ICA/CCA RATIO: 1.56
BH CV XLRA MEAS LEFT MID CCA EDV: 11.6 CM/SEC
BH CV XLRA MEAS LEFT MID CCA PSV: 50.7 CM/SEC
BH CV XLRA MEAS LEFT MID ICA EDV: 23.7 CM/SEC
BH CV XLRA MEAS LEFT MID ICA PSV: 81.4 CM/SEC
BH CV XLRA MEAS LEFT PROX CCA EDV: 13.3 CM/SEC
BH CV XLRA MEAS LEFT PROX CCA PSV: 60.2 CM/SEC
BH CV XLRA MEAS LEFT PROX ECA EDV: 10.7 CM/SEC
BH CV XLRA MEAS LEFT PROX ECA PSV: 86 CM/SEC
BH CV XLRA MEAS LEFT PROX ICA EDV: 11.4 CM/SEC
BH CV XLRA MEAS LEFT PROX ICA PSV: 54.7 CM/SEC
BH CV XLRA MEAS LEFT VERTEBRAL A EDV: 8.6 CM/SEC
BH CV XLRA MEAS LEFT VERTEBRAL A PSV: 28.8 CM/SEC
BH CV XLRA MEAS RIGHT BULB EDV: 13.7 CM/SEC
BH CV XLRA MEAS RIGHT BULB PSV: 85.6 CM/SEC
BH CV XLRA MEAS RIGHT DIST CCA EDV: 11.7 CM/SEC
BH CV XLRA MEAS RIGHT DIST CCA PSV: 52.7 CM/SEC
BH CV XLRA MEAS RIGHT DIST ICA EDV: 18.7 CM/SEC
BH CV XLRA MEAS RIGHT DIST ICA PSV: 69.5 CM/SEC
BH CV XLRA MEAS RIGHT ICA/CCA RATIO: 1.73
BH CV XLRA MEAS RIGHT MID CCA EDV: 11.2 CM/SEC
BH CV XLRA MEAS RIGHT MID CCA PSV: 50.3 CM/SEC
BH CV XLRA MEAS RIGHT MID ICA EDV: 16 CM/SEC
BH CV XLRA MEAS RIGHT MID ICA PSV: 82.1 CM/SEC
BH CV XLRA MEAS RIGHT PROX CCA EDV: 8.5 CM/SEC
BH CV XLRA MEAS RIGHT PROX CCA PSV: 51.7 CM/SEC
BH CV XLRA MEAS RIGHT PROX ECA EDV: 12.5 CM/SEC
BH CV XLRA MEAS RIGHT PROX ECA PSV: 143.6 CM/SEC
BH CV XLRA MEAS RIGHT PROX ICA EDV: 14.2 CM/SEC
BH CV XLRA MEAS RIGHT PROX ICA PSV: 55.5 CM/SEC
BH CV XLRA MEAS RIGHT VERTEBRAL A EDV: 7.7 CM/SEC
BH CV XLRA MEAS RIGHT VERTEBRAL A PSV: 32.2 CM/SEC
IVRT: 130 MSEC
LEFT ARM BP: NORMAL MMHG
LEFT ATRIUM VOLUME INDEX: 36 ML/M2
LEFT ATRIUM VOLUME: 71 CM3
LV EF 2D ECHO EST: 50 %
PV VALVE AREA: 5.2 CM2
RIGHT ARM BP: NORMAL MMHG

## 2021-03-09 RX ORDER — RANOLAZINE 1000 MG/1
1000 TABLET, EXTENDED RELEASE ORAL EVERY 12 HOURS SCHEDULED
Qty: 60 TABLET | Refills: 6 | Status: SHIPPED | OUTPATIENT
Start: 2021-03-09 | End: 2021-03-24 | Stop reason: SDUPTHER

## 2021-03-11 ENCOUNTER — TELEPHONE (OUTPATIENT)
Dept: CARDIOLOGY | Facility: CLINIC | Age: 71
End: 2021-03-11

## 2021-03-11 ENCOUNTER — TREATMENT (OUTPATIENT)
Dept: PHYSICAL THERAPY | Facility: CLINIC | Age: 71
End: 2021-03-11

## 2021-03-11 DIAGNOSIS — M54.2 CERVICALGIA: Primary | ICD-10-CM

## 2021-03-11 PROCEDURE — 97140 MANUAL THERAPY 1/> REGIONS: CPT | Performed by: PHYSICAL THERAPIST

## 2021-03-11 PROCEDURE — 97110 THERAPEUTIC EXERCISES: CPT | Performed by: PHYSICAL THERAPIST

## 2021-03-11 PROCEDURE — 97530 THERAPEUTIC ACTIVITIES: CPT | Performed by: PHYSICAL THERAPIST

## 2021-03-11 NOTE — PROGRESS NOTES
Physical Therapy Daily Progress Note    Patient: Kaveh Vences   : 1950  Referring practitioner: Etienne Mckinnon MD  Today's Date: 3/11/2021    VISIT#: 4    Subjective Evaluation    History of Present Illness  Mechanism of injury: Pt reports he is doing about the same.  He is able to get comfortable at night with the pillow and the rolled towel.  He does have inc pain in the morning trying to get up whether he is on his back, side or stomach.  He has a stabbing pain right on the spine at the lower part when he starts to get up.  He rates improvement so far with PT at at least 25%.      Pain  Current pain ratin           Objective     See Exercise, Manual, and Modality Logs for complete treatment.   Cont man there and there ex    Cervical AROM:  Rot  R:  Minimal limitation with pain                              Rot  L:  Minimal limitation without pain    Patient Education: THERE ACT:  practiced transferring supine to sit and sidelying to sit on EOB for morning transfers OOB    Assessment & Plan     Assessment  Assessment details: Pt has met 4/5 STGs so far and reports 25% improvement in pain and mobility         STGs:  6 weeks  1.  Pt to tolerate initial HEP without inc pain.  MET  2.  Pt to tolerate advancement of HEP without inc pain.  MET  3.  Pt to report pain has decreased by at least 25%.  MET  4.  Pt to improve  AROM of cervical extension and bilateral rotation  to at least minimal limitation.  MET  5.  Pt to sit with proper posture without cues required.  PART  MET      Progress per Plan of Care            Timed:         Manual Therapy:  20       mins  24970;     Therapeutic Exercise:  15       mins  11070;     Neuromuscular Summer:        mins  21815;    Therapeutic Activity:  10        mins  51254;     Gait Training:           mins  67592;     Ultrasound:          mins  18980;    Ionto                                   mins   92943  Self Care                            mins   37857  [facility]                    mins  4209    Un-Timed:  Electrical Stimulation:         mins  83161 ( );  Traction          mins 37520  Low Eval          Mins  30215  Mod Eval          Mins  51894  High Eval                            Mins  38622  Re-Eval                               mins  67858    Timed Treatment: 45     mins   Total Treatment:   45     mins    Yanna Newton, PT  Physical Therapist

## 2021-03-11 NOTE — TELEPHONE ENCOUNTER
Called and informed the Pt per Dr. Wong that his carotid ultrasound looks okay.  The Pt stated understanding.

## 2021-03-15 ENCOUNTER — TREATMENT (OUTPATIENT)
Dept: PHYSICAL THERAPY | Facility: CLINIC | Age: 71
End: 2021-03-15

## 2021-03-15 DIAGNOSIS — M54.2 CERVICALGIA: Primary | ICD-10-CM

## 2021-03-15 PROCEDURE — 97110 THERAPEUTIC EXERCISES: CPT | Performed by: PHYSICAL THERAPIST

## 2021-03-15 PROCEDURE — 97140 MANUAL THERAPY 1/> REGIONS: CPT | Performed by: PHYSICAL THERAPIST

## 2021-03-15 NOTE — PROGRESS NOTES
Physical Therapy Daily Progress Note    Patient: Kaveh Vences   : 1950  Referring practitioner: Etienne Mckinnon MD  Today's Date: 3/15/2021    VISIT#: 5    Subjective Evaluation    History of Present Illness  Mechanism of injury: Pt thought he was scheduled for 2:30 so he was late today.  Pt feels really good today with no pain even with turning hi head.  He has realized he watches TV in the bed every night with his head up against the headboard.      Pain  Current pain ratin         Objective     See Exercise, Manual, and Modality Logs for complete treatment.   Pt was late for session today;  Cont with man and there ex as time allowed    Patient Education:  Added to HEP today as noted with written instructions issued    Assessment & Plan     Assessment  Assessment details: Da well today with improvement after realizing he shouldn't be watching TV propped up in bed        Progress per Plan of Care        Timed:         Manual Therapy:  15       mins  96379;     Therapeutic Exercise:   15      mins  57515;     Neuromuscular Summer:        mins  20229;    Therapeutic Activity:          mins  18777;     Gait Training:           mins  86387;     Ultrasound:          mins  18803;    Ionto                                   mins   49099  Self Care                            mins   78866  Canalith Repos                   mins  4209    Un-Timed:  Electrical Stimulation:         mins  82657 ( );  Traction          mins 89037  Low Eval          Mins  00347  Mod Eval          Mins  14737  High Eval                            Mins  06132  Re-Eval                               mins  28720    Timed Treatment: 30     mins   Total Treatment:   30     mins    Yanna Newton PT  Physical Therapist

## 2021-03-18 ENCOUNTER — TREATMENT (OUTPATIENT)
Dept: PHYSICAL THERAPY | Facility: CLINIC | Age: 71
End: 2021-03-18

## 2021-03-18 DIAGNOSIS — M54.2 CERVICALGIA: Primary | ICD-10-CM

## 2021-03-18 PROCEDURE — 97110 THERAPEUTIC EXERCISES: CPT | Performed by: PHYSICAL THERAPIST

## 2021-03-18 PROCEDURE — 97140 MANUAL THERAPY 1/> REGIONS: CPT | Performed by: PHYSICAL THERAPIST

## 2021-03-18 PROCEDURE — 97112 NEUROMUSCULAR REEDUCATION: CPT | Performed by: PHYSICAL THERAPIST

## 2021-03-18 NOTE — PROGRESS NOTES
Physical Therapy Daily Progress Note    Patient: Kaveh Vences   : 1950  Referring practitioner: Etienne Mckinnon MD  Today's Date: 3/18/2021    VISIT#: 6    Subjective Evaluation    History of Present Illness  Mechanism of injury: Pt reports he has stopped leaning in the headboard and watching TV and that seems to help quite a bit.  This morning his pain was a little bit more than a 2/10.    Pain  Current pain ratin         Objective     See Exercise, Manual, and Modality Logs for complete treatment.     Cont with man, NMR and there ex today;  Added deep cerv flexor strengthening ex today with tactile and verbal cues needed for proper technique.      Assessment & Plan     Assessment  Assessment details: Performed all ex very well without inc pain and with good form including the new one for deep cervical strengthening          Progress per Plan of Care        Timed:         Manual Therapy:   15      mins  95997;     Therapeutic Exercise:   15      mins  34359;     Neuromuscular Summer: 10       mins  69075;    Therapeutic Activity:          mins  48066;     Gait Training:           mins  55646;     Ultrasound:          mins  98651;    Ionto                                  mins   44659  Self Care                            mins   21020  Canalith Repos                   mins  4209    Un-Timed:  Electrical Stimulation:         mins  51214 ( );  Traction          mins 89502  Low Eval          Mins  93514  Mod Eval          Mins  94642  High Eval                            Mins  49104  Re-Eval                               mins  42924    Timed Treatment: 40     mins   Total Treatment:   40     mins    Yanna Newton PT  Physical Therapist

## 2021-03-19 RX ORDER — AMLODIPINE BESYLATE 5 MG/1
TABLET ORAL
Qty: 90 TABLET | Refills: 0 | Status: SHIPPED | OUTPATIENT
Start: 2021-03-19 | End: 2021-03-24 | Stop reason: SDUPTHER

## 2021-03-22 ENCOUNTER — TREATMENT (OUTPATIENT)
Dept: PHYSICAL THERAPY | Facility: CLINIC | Age: 71
End: 2021-03-22

## 2021-03-22 DIAGNOSIS — M54.2 CERVICALGIA: Primary | ICD-10-CM

## 2021-03-22 PROCEDURE — 97110 THERAPEUTIC EXERCISES: CPT | Performed by: PHYSICAL THERAPIST

## 2021-03-22 PROCEDURE — 97140 MANUAL THERAPY 1/> REGIONS: CPT | Performed by: PHYSICAL THERAPIST

## 2021-03-22 PROCEDURE — 97112 NEUROMUSCULAR REEDUCATION: CPT | Performed by: PHYSICAL THERAPIST

## 2021-03-22 NOTE — PROGRESS NOTES
Physical Therapy Daily Progress Note    Patient: Kaveh Vences   : 1950  Referring practitioner: Etienne Mckinnon MD  Today's Date: 3/22/2021    VISIT#: 7    Subjective Evaluation    History of Present Illness  Mechanism of injury: Pt is sleeping now without watching TV in the bed.  If he does he lies on his side.  He also sleeps on his side too and it is working well so far.    Pain  Current pain ratin  Location: 2/10 this am         Objective     See Exercise, Manual, and Modality Logs for complete treatment.     Cont with man, there ex and NMR as noted; note pt with inc ROM during cerv flex with rotation bilaterally today      Assessment & Plan     Assessment  Assessment details: laila well with improvement noted with AROM today during a specific ex targeting upper cervical rotation      Progress per Plan of Care        Timed:         Manual Therapy: 15        mins  20958;     Therapeutic Exercise:  20       mins  12555;     Neuromuscular Summer: 10       mins  80973;    Therapeutic Activity:          mins  36146;     Gait Training:           mins  40496;     Ultrasound:          mins  78935;    Ionto                                   mins   59736  Self Care                            mins   89387  Canalith Repos                   mins  4209    Un-Timed:  Electrical Stimulation:         mins  16211 ( );  Traction          mins 72565  Low Eval          Mins  07794  Mod Eval          Mins  14201  High Eval                            Mins  47638  Re-Eval                               mins  59444    Timed Treatment: 45     mins   Total Treatment:    45    mins    Yanna Newton PT  Physical Therapist

## 2021-03-24 ENCOUNTER — OFFICE VISIT (OUTPATIENT)
Dept: CARDIOLOGY | Facility: CLINIC | Age: 71
End: 2021-03-24

## 2021-03-24 VITALS
BODY MASS INDEX: 28.88 KG/M2 | SYSTOLIC BLOOD PRESSURE: 126 MMHG | OXYGEN SATURATION: 97 % | RESPIRATION RATE: 18 BRPM | DIASTOLIC BLOOD PRESSURE: 66 MMHG | HEART RATE: 55 BPM | WEIGHT: 184 LBS | HEIGHT: 67 IN

## 2021-03-24 DIAGNOSIS — I25.708 ATHEROSCLEROSIS OF CORONARY ARTERY BYPASS GRAFT(S), UNSPECIFIED, WITH OTHER FORMS OF ANGINA PECTORIS (HCC): ICD-10-CM

## 2021-03-24 DIAGNOSIS — I20.9 ANGINA PECTORIS (HCC): ICD-10-CM

## 2021-03-24 DIAGNOSIS — I48.0 PAROXYSMAL ATRIAL FIBRILLATION (HCC): ICD-10-CM

## 2021-03-24 DIAGNOSIS — I25.5 ISCHEMIC CARDIOMYOPATHY: ICD-10-CM

## 2021-03-24 DIAGNOSIS — I50.30 CONGESTIVE HEART FAILURE WITH LV DIASTOLIC DYSFUNCTION, NYHA CLASS 3 (HCC): ICD-10-CM

## 2021-03-24 DIAGNOSIS — I25.718 CORONARY ARTERY DISEASE OF AUTOLOGOUS VEIN BYPASS GRAFT WITH STABLE ANGINA PECTORIS (HCC): ICD-10-CM

## 2021-03-24 DIAGNOSIS — I34.0 MODERATE MITRAL REGURGITATION: Primary | ICD-10-CM

## 2021-03-24 DIAGNOSIS — Z95.1 S/P CABG X 4: ICD-10-CM

## 2021-03-24 PROCEDURE — 99214 OFFICE O/P EST MOD 30 MIN: CPT | Performed by: INTERNAL MEDICINE

## 2021-03-24 RX ORDER — NITROGLYCERIN 0.4 MG/1
0.4 TABLET SUBLINGUAL
Qty: 30 TABLET | Refills: 4 | Status: SHIPPED | OUTPATIENT
Start: 2021-03-24

## 2021-03-24 RX ORDER — AMLODIPINE BESYLATE 5 MG/1
5 TABLET ORAL DAILY
Qty: 90 TABLET | Refills: 3 | Status: SHIPPED | OUTPATIENT
Start: 2021-03-24 | End: 2021-06-22

## 2021-03-24 RX ORDER — METOPROLOL TARTRATE 50 MG/1
50 TABLET, FILM COATED ORAL 2 TIMES DAILY
Qty: 180 TABLET | Refills: 3 | Status: SHIPPED | OUTPATIENT
Start: 2021-03-24 | End: 2022-05-04

## 2021-03-24 RX ORDER — RANOLAZINE 1000 MG/1
1000 TABLET, EXTENDED RELEASE ORAL EVERY 12 HOURS SCHEDULED
Qty: 180 TABLET | Refills: 3 | Status: SHIPPED | OUTPATIENT
Start: 2021-03-24 | End: 2022-04-05

## 2021-03-24 RX ORDER — ISOSORBIDE MONONITRATE 30 MG/1
30 TABLET, EXTENDED RELEASE ORAL DAILY
Qty: 90 TABLET | Refills: 3 | Status: SHIPPED | OUTPATIENT
Start: 2021-03-24 | End: 2022-04-19

## 2021-03-24 RX ORDER — CLOPIDOGREL BISULFATE 75 MG/1
75 TABLET ORAL DAILY
Qty: 90 TABLET | Refills: 3 | Status: SHIPPED | OUTPATIENT
Start: 2021-03-24 | End: 2022-05-05

## 2021-03-24 RX ORDER — LISINOPRIL 20 MG/1
20 TABLET ORAL 2 TIMES DAILY
Qty: 180 TABLET | Refills: 3 | Status: SHIPPED | OUTPATIENT
Start: 2021-03-24 | End: 2022-06-07

## 2021-03-24 RX ORDER — ATORVASTATIN CALCIUM 20 MG/1
20 TABLET, FILM COATED ORAL DAILY
Qty: 90 TABLET | Refills: 3 | Status: SHIPPED | OUTPATIENT
Start: 2021-03-24 | End: 2022-04-05

## 2021-03-24 NOTE — PROGRESS NOTES
Cardiology Office Visit      Encounter Date:  03/24/2021    Patient ID:   Kaveh Vences is a 70 y.o. male.    Reason For Followup:  Coronary artery disease  Hypertension  Hyperlipidemia      Brief Clinical History:  Dear Skinny Harper MD    I had the pleasure of seeing Kaveh Vences today. As you are well aware, this is a 70 y.o. male with history of diabetes,  coronary artery disease, status post previous CABG in 2012, and PCI stenting.     cardiac catheterization with graft angiography showed LV ejection fraction of 40%, severe native three-vessel coronary disease with 90% calcified stenosis involving the distal left main involving the ostium of the LAD significant 99% lesion of left circumflex coronary artery.  100% occlusion of the nondominant right coronary artery was noted as well.  Ashby to the LAD was patent and saphenous vein graft to marginal branch was patent as well. Echocardiogram with LV ejection fraction of 45% with moderate mitral regurgitation      Interval History:  Denies any new complaints  No further chest pain  No unstable symptoms  Dizziness and shortness of breath have improved    Interpretation Summary    · Left ventricular wall thickness is consistent with moderate asymmetric hypertrophy.  · Estimated left ventricular EF = 50% Estimated left ventricular EF was in agreement with the calculated left ventricular EF. Left ventricular systolic function is normal.  · Estimated right ventricular systolic pressure from tricuspid regurgitation is normal (<35 mmHg).  · Moderate dilation of the aortic root is present. Moderate dilation of the ascending aorta is present.  · Left ventricular diastolic function is consistent with (grade I) impaired relaxation.        Interpretation Summary    · Mid right internal carotid artery mild stenosis.  · Mid left internal carotid artery mild stenosis.  · Study Impression: • Right ICA Mid: Imaging indicates 16%-49% stenosis. • Left ICA Mid: Imaging indicates  "16-49% stenosis.           Assessment & Plan    Impressions:  Coronary artery disease status post coronary artery bypass surgery  Hypertension  Hyperlipidemia  Sinus bradycardia with a left bundle branch block  Peripheral vascular disease/altered moderate obstructive carotid artery disease  Mild to moderate mitral regurgitation    Recommendations:  Recent evaluation at [facility] with a repeat cardiac catheterization with no significant change in the anatomy compared to before opted for medical therapy  Repeat echocardiogram with only mild to moderate mitral regurgitation somewhat better from before  Continue Ranexa 1000 mg at night  Good functional capacity  Cardiogram discussed with the patient  Labs with the primary care physician office  Plan treat patient conservatively from cardiac standpoint  Continue current medical therapy  Patient is already on maximal medical therapy  Continue close monitoring  Results reviewed and discussed with patient  Labs reviewed and discussed with the patient    Follow-up in office 6 months    Objective:    Vitals:  Vitals:    03/24/21 1005   BP: 126/66   BP Location: Left arm   Pulse: 55   Resp: 18   SpO2: 97%   Weight: 83.5 kg (184 lb)   Height: 170.2 cm (67\")       Physical Exam:    General: Alert, cooperative, no distress, appears stated age  Head:  Normocephalic, atraumatic, mucous membranes moist  Eyes:  Conjunctiva/corneas clear, EOM's intact     Neck:  Supple,  no adenopathy;      Lungs: Clear to auscultation bilaterally, no wheezes rhonchi rales are noted  Chest wall: No tenderness  Heart::  Regular rate and rhythm, S1 and S2 normal, no murmur, rub or gallop  Abdomen: Soft, non-tender, nondistended bowel sounds active  Extremities: No cyanosis, clubbing, or edema  Pulses: 2+ and symmetric all extremities  Skin:  No rashes or lesions  Neuro/psych: A&O x3. CN II through XII are grossly intact with appropriate affect      Allergies:  No Known Allergies    Medication " Review:     Current Outpatient Medications:   •  amLODIPine (NORVASC) 5 MG tablet, Take 1 tablet by mouth once daily, Disp: 90 tablet, Rfl: 0  •  aspirin 81 MG EC tablet, Take 81 mg by mouth Daily., Disp: , Rfl:   •  atorvastatin (LIPITOR) 20 MG tablet, Take 1 tablet by mouth Daily., Disp: 90 tablet, Rfl: 3  •  clopidogrel (PLAVIX) 75 MG tablet, Take 1 tablet by mouth Daily., Disp: 90 tablet, Rfl: 3  •  isosorbide mononitrate (IMDUR) 30 MG 24 hr tablet, Take 1 tablet by mouth Daily., Disp: 90 tablet, Rfl: 3  •  lisinopril (PRINIVIL,ZESTRIL) 20 MG tablet, Take 1 tablet by mouth 2 (Two) Times a Day., Disp: 180 tablet, Rfl: 3  •  metFORMIN ER (GLUCOPHAGE-XR) 500 MG 24 hr tablet, TAKE 2 TABLETS BY MOUTH TWICE DAILY FOR 30 DAYS, Disp: , Rfl:   •  metoprolol tartrate (LOPRESSOR) 50 MG tablet, Take 1 tablet by mouth 2 (Two) Times a Day., Disp: 180 tablet, Rfl: 3  •  nitroglycerin (NITROSTAT) 0.4 MG SL tablet, Place 1 tablet under the tongue Every 5 (Five) Minutes As Needed for Chest Pain. Take no more than 3 doses in 15 minutes., Disp: 30 tablet, Rfl: 4  •  ranolazine (Ranexa) 1000 MG 12 hr tablet, Take 1 tablet by mouth Every 12 (Twelve) Hours., Disp: 60 tablet, Rfl: 6    Family History:  Family History   Problem Relation Age of Onset   • Coronary artery disease Father    • Coronary artery disease Brother    • Heart attack Brother    • Stroke Brother    • Diabetes Maternal Uncle        Past Medical History:  Past Medical History:   Diagnosis Date   • Atrial fibrillation (CMS/HCC)    • Coronary artery disease    • Diabetes mellitus (CMS/HCC)    • Gallstones    • Hyperlipidemia    • Hypertension    • Injury of back    • Low back pain    • Mitral regurgitation    • Rheumatoid arthritis (CMS/HCC)    • Sleep apnea    • Valvular disease        Past surgical History:  Past Surgical History:   Procedure Laterality Date   • BACK SURGERY  01/2019   • CARDIAC CATHETERIZATION  2006, 2009, 2012, 2018   • CHOLECYSTECTOMY  12/22/2015    • CORONARY ANGIOPLASTY  2006, 2009   • CORONARY ARTERY BYPASS GRAFT  05/11/2012    x2   • ROTATOR CUFF REPAIR Left        Social History:  Social History     Socioeconomic History   • Marital status:      Spouse name: Not on file   • Number of children: Not on file   • Years of education: Not on file   • Highest education level: Not on file   Tobacco Use   • Smoking status: Never Smoker   • Smokeless tobacco: Never Used   Vaping Use   • Vaping Use: Never used   Substance and Sexual Activity   • Alcohol use: No   • Drug use: No   • Sexual activity: Defer       Review of Systems:  The following systems were reviewed as they relate to the cardiovascular system: Constitutional, Eyes, ENT, Cardiovascular, Respiratory, Gastrointestinal, Integumentary, Neurological, Psychiatric, Hematologic, Endocrine, Musculoskeletal, and Genitourinary. The pertinent cardiovascular findings are reported above with all other cardiovascular points within those systems being negative.    Diagnostic Study Review:     Current Electrocardiogram:  Procedures      NOTE: The following portions of the patient's history were reviewed and updated this visit as appropriate: allergies, current medications, past family history, past medical history, past social history, past surgical history and problem list.

## 2021-03-25 ENCOUNTER — TREATMENT (OUTPATIENT)
Dept: PHYSICAL THERAPY | Facility: CLINIC | Age: 71
End: 2021-03-25

## 2021-03-25 DIAGNOSIS — M54.2 CERVICALGIA: Primary | ICD-10-CM

## 2021-03-25 PROCEDURE — 97112 NEUROMUSCULAR REEDUCATION: CPT | Performed by: PHYSICAL THERAPIST

## 2021-03-25 PROCEDURE — 97140 MANUAL THERAPY 1/> REGIONS: CPT | Performed by: PHYSICAL THERAPIST

## 2021-03-25 PROCEDURE — 97110 THERAPEUTIC EXERCISES: CPT | Performed by: PHYSICAL THERAPIST

## 2021-03-25 NOTE — PROGRESS NOTES
Physical Therapy Daily Progress Note    Patient: Kaveh Vences   : 1950  Referring practitioner: Etienne Mckinnon MD  Today's Date: 3/25/2021    VISIT#: 8    Subjective Evaluation    History of Present Illness  Mechanism of injury: Pt reports he feels like he is continuing to make progress; his neck used to hurt several hours in the morning and hurt some all day up to a 5/10.  It does not hurt very long in the morning now and it does not get up as high.      Pain  Current pain ratin         Objective     See Exercise, Manual, and Modality Logs for complete treatment.     Patient Education:    Assessment & Plan     Assessment  Assessment details: laila well today without inc pain during or after; progressing well with spinal mobility          Progress per Plan of Care        Timed:         Manual Therapy: 20        mins  40013;     Therapeutic Exercise: 15        mins  23413;     Neuromuscular Summer:10        mins  69417;    Therapeutic Activity:          mins  59497;     Gait Training:           mins  65156;     Ultrasound:          mins  36260;    Ionto                                   mins   59156  Self Care                            mins   72862  Canalith Repos                   mins  4209    Un-Timed:  Electrical Stimulation:         mins  74535 ( );  Traction          mins 41561  Low Eval          Mins  45545  Mod Eval          Mins  62160  High Eval                            Mins  00828  Re-Eval                               mins  77571    Timed Treatment: 45     mins   Total Treatment:   45     mins    Yanna Newton PT  Physical Therapist

## 2021-03-29 ENCOUNTER — TREATMENT (OUTPATIENT)
Dept: PHYSICAL THERAPY | Facility: CLINIC | Age: 71
End: 2021-03-29

## 2021-03-29 DIAGNOSIS — M54.2 CERVICALGIA: Primary | ICD-10-CM

## 2021-03-29 DIAGNOSIS — Z91.89 AT RISK FOR IMPAIRMENT OF SLEEP: ICD-10-CM

## 2021-03-29 PROCEDURE — 97140 MANUAL THERAPY 1/> REGIONS: CPT | Performed by: PHYSICAL THERAPIST

## 2021-03-29 PROCEDURE — 97110 THERAPEUTIC EXERCISES: CPT | Performed by: PHYSICAL THERAPIST

## 2021-03-29 NOTE — PROGRESS NOTES
Physical Therapy Daily Progress Note    VISIT#: 9    Subjective   Kaveh Vences reports that his neck is always tight but that he has been dong better since starting therapy. Pt reports that his LB is hurting today following putting trim on at a house over the weekend.   Pain Rating (0/10): 2    Objective     See Exercise, Manual, and Modality Logs for complete treatment.     Assessment/Plan   Hypertonicity noted in LUKE SCM, UT, scalene with slight improvements noted with manual therapy. Pt continues to progress deep cervical strengthening and cervical ROM at this time.     Plan  Progress per Plan of Care and Progress strengthening /stabilization /functional activity            Timed:         Manual Therapy:    17     mins  21180;     Therapeutic Exercise:    25     mins  17565;     Neuromuscular Summer:        mins  09354;    Therapeutic Activity:          mins  51982;     Gait Training:           mins  84809;     Ultrasound:          mins  56910;    Ionto                                   mins   54685  Self Care                            mins   64483    Un-Timed:  Electrical Stimulation:         mins  41294 ( );  Dry Needling          mins self-pay  Traction          mins 73225  Low Eval          Mins  18905  Mod Eval          Mins  04879  High Eval                           Mins  65891  Re-Eval                               mins  58461    Timed Treatment:   42   mins   Total Treatment:     42   mins    Amberly Haji PT, DPT  Physical Therapist

## 2021-04-01 ENCOUNTER — TREATMENT (OUTPATIENT)
Dept: PHYSICAL THERAPY | Facility: CLINIC | Age: 71
End: 2021-04-01

## 2021-04-01 DIAGNOSIS — M54.2 CERVICALGIA: Primary | ICD-10-CM

## 2021-04-01 PROCEDURE — 97112 NEUROMUSCULAR REEDUCATION: CPT | Performed by: PHYSICAL THERAPIST

## 2021-04-01 PROCEDURE — 97140 MANUAL THERAPY 1/> REGIONS: CPT | Performed by: PHYSICAL THERAPIST

## 2021-04-01 PROCEDURE — 97110 THERAPEUTIC EXERCISES: CPT | Performed by: PHYSICAL THERAPIST

## 2021-04-01 NOTE — PROGRESS NOTES
Physical Therapy Daily Progress Note    Patient: Kaveh Vences   : 1950  Referring practitioner: Etienne Mckinnon MD  Today's Date: 2021    VISIT#: 10    Subjective Evaluation    History of Present Illness  Mechanism of injury: Pt reports he feels at least 75% improved overall since the start of PT.  He is sleeping better, tolerating work around the house much better too.  He has been doing his HEP regularly.      NDI:  6%    Pain  Current pain ratin  Location: upon awakening 2/10         Objective     See Exercise, Manual, and Modality Logs for complete treatment.     Cervical AROM:  Rot  R:  Minimal limitation with pain                              Rot  L:  Minimal limitation without pain       Assessment & Plan     Assessment  Assessment details: Pt has met 5/5 STGs and 2/6 LTGs so far.  He rates improvement at this time at 75% since start of PT.  He is sleeping better, driving better and working around the house better.  Pt would benefit from continued skilled PT to further improve impairments and pain and maximize function.         STGs:  6 weeks  1.  Pt to tolerate initial HEP without inc pain.  MET  2.  Pt to tolerate advancement of HEP without inc pain.  MET  3.  Pt to report pain has decreased by at least 25%.  MET  4.  Pt to improve  AROM of cervical extension and bilateral rotation  to at least minimal limitation.  MET  5.  Pt to sit with proper posture without cues required.  MET    LTGs:  By DC  1.  Pt to be (I) with HEP.  2.  Pt to report pain has decreased by at least 80% to allow better tolerance to and performance of sleep quality and comfort while lying down in any position.  3.  Pt to improve AROM of cervical spine all planes to min limit to WFL without pain to allow better sleep quality.    4.  Pt to improve head and neck position while sleeping via trying different pillows at home and reporting improvement in pain.  MET  5.  Pt to exhibit improved function as indicated by improved  score on NDI vs score at evaluation.  MET  6.  Pt to demonstrate (I) and appropriate use of body mechanics for daily home and/or work tasks.      Progress per Plan of Care        Timed:         Manual Therapy:  15       mins  30572;     Therapeutic Exercise:  15       mins  75381;     Neuromuscular Summer:  10      mins  65059;    Therapeutic Activity:          mins  55405;     Gait Training:           mins  90112;     Ultrasound:          mins  60384;    Ionto                                   mins   70933  Self Care                            mins   63925  Canalith Repos                   mins  4209    Un-Timed:  Electrical Stimulation:         mins  38482 ( );  Traction          mins 75087  Low Eval          Mins  07666  Mod Eval          Mins  76444  High Eval                            Mins  36872  Re-Eval                               mins  45539    Timed Treatment: 40     mins   Total Treatment:   40     mins    Yanna Newton PT  Physical Therapist

## 2021-04-05 ENCOUNTER — TREATMENT (OUTPATIENT)
Dept: PHYSICAL THERAPY | Facility: CLINIC | Age: 71
End: 2021-04-05

## 2021-04-05 DIAGNOSIS — M54.2 CERVICALGIA: Primary | ICD-10-CM

## 2021-04-05 PROCEDURE — 97112 NEUROMUSCULAR REEDUCATION: CPT | Performed by: PHYSICAL THERAPIST

## 2021-04-05 PROCEDURE — 97110 THERAPEUTIC EXERCISES: CPT | Performed by: PHYSICAL THERAPIST

## 2021-04-05 PROCEDURE — 97140 MANUAL THERAPY 1/> REGIONS: CPT | Performed by: PHYSICAL THERAPIST

## 2021-04-05 NOTE — PROGRESS NOTES
Physical Therapy Daily Progress Note    Patient: Kaveh Vences   : 1950  Referring practitioner: Etienne Mckinnon MD  Today's Date: 2021    VISIT#: 11    Subjective Evaluation    History of Present Illness  Mechanism of injury: Pt reports he was a little bit sore after last session.  He felt like he got a work out that day.    Pain  Current pain ratin         Objective     See Exercise, Manual, and Modality Logs for complete treatment.     Patient Education:    Assessment & Plan     Assessment  Assessment details: laila well today with all ex and man therapy; gaining better control with deep cervical flexor strength          Progress per Plan of Care        Timed:         Manual Therapy:  15       mins  18398;     Therapeutic Exercise:   15      mins  88862;     Neuromuscular Summer: 10       mins  36326;    Therapeutic Activity:          mins  67321;     Gait Training:           mins  88629;     Ultrasound:          mins  15721;    Ionto                                   mins   07399  Self Care                            mins   33839  Canalith Repos                   mins  4209    Un-Timed:  Electrical Stimulation:         mins  87181 ( );  Traction          mins 19645  Low Eval          Mins  11817  Mod Eval          Mins  31288  High Eval                            Mins  11646  Re-Eval                               mins  25829    Timed Treatment: 40     mins   Total Treatment:  40      mins    Yanna Newton PT  Physical Therapist

## 2021-04-08 ENCOUNTER — TREATMENT (OUTPATIENT)
Dept: PHYSICAL THERAPY | Facility: CLINIC | Age: 71
End: 2021-04-08

## 2021-04-08 DIAGNOSIS — M54.2 CERVICALGIA: Primary | ICD-10-CM

## 2021-04-08 PROCEDURE — 97110 THERAPEUTIC EXERCISES: CPT | Performed by: PHYSICAL THERAPIST

## 2021-04-08 PROCEDURE — 97140 MANUAL THERAPY 1/> REGIONS: CPT | Performed by: PHYSICAL THERAPIST

## 2021-04-08 NOTE — PROGRESS NOTES
Physical Therapy Daily Progress Note    Patient: Kaveh Vences   : 1950  Referring practitioner: Etienne Mckinnon MD  Today's Date: 2021    VISIT#: 12    Subjective Evaluation    History of Present Illness  Mechanism of injury: He was not sore from the last visit here    Pain  Current pain ratin         Objective     See Exercise, Manual, and Modality Logs for complete treatment.     Patient Education:  Be sure to do all of the rotations and stretches at home to maintain gains made in PT sessions    Assessment & Plan     Assessment  Assessment details: laila well with all act/ex today and inc rotation ROM after manual therapy.        Progress per Plan of Care        Timed:         Manual Therapy: 25        mins  33001;     Therapeutic Exercise:  15       mins  88586;     Neuromuscular Summer:        mins  51831;    Therapeutic Activity:          mins  05088;     Gait Training:           mins  69747;     Ultrasound:          mins  35297;    Ionto                                   mins   12808  Self Care                            mins   17429  Canalith Repos                   mins  4209    Un-Timed:  Electrical Stimulation:         mins  50673 ( );  Traction          mins 26299  Low Eval          Mins  81529  Mod Eval          Mins  24271  High Eval                            Mins  82676  Re-Eval                               mins  16827    Timed Treatment: 40     mins   Total Treatment:    40    mins    Yanna Newton PT  Physical Therapist

## 2021-04-14 ENCOUNTER — OFFICE VISIT (OUTPATIENT)
Dept: PAIN MEDICINE | Facility: CLINIC | Age: 71
End: 2021-04-14

## 2021-04-14 VITALS
BODY MASS INDEX: 28.88 KG/M2 | WEIGHT: 184 LBS | HEART RATE: 49 BPM | SYSTOLIC BLOOD PRESSURE: 138 MMHG | TEMPERATURE: 97.2 F | RESPIRATION RATE: 16 BRPM | OXYGEN SATURATION: 98 % | DIASTOLIC BLOOD PRESSURE: 78 MMHG | HEIGHT: 67 IN

## 2021-04-14 DIAGNOSIS — M54.12 CERVICAL RADICULITIS: ICD-10-CM

## 2021-04-14 DIAGNOSIS — G89.4 CHRONIC PAIN SYNDROME: Primary | ICD-10-CM

## 2021-04-14 DIAGNOSIS — M47.812 CERVICAL SPONDYLOSIS WITHOUT MYELOPATHY: ICD-10-CM

## 2021-04-14 DIAGNOSIS — M96.1 POSTLAMINECTOMY SYNDROME OF LUMBAR REGION: ICD-10-CM

## 2021-04-14 PROCEDURE — 99214 OFFICE O/P EST MOD 30 MIN: CPT | Performed by: ANESTHESIOLOGY

## 2021-04-14 RX ORDER — TIZANIDINE 4 MG/1
4 TABLET ORAL 2 TIMES DAILY PRN
Qty: 60 TABLET | Refills: 1 | Status: SHIPPED | OUTPATIENT
Start: 2021-04-14 | End: 2021-06-22 | Stop reason: SDUPTHER

## 2021-04-14 NOTE — PROGRESS NOTES
Subjective    CC Back pain  Kaveh Vences is a 70 y.o. male with chronic back pain history of decompression/L2-3 microdiscectomy in 2019/Dr. Brandon, here for f/u.  Complains of several months of progressively worsening neck pain radiating to head and bilateral shoulders worse at night and associated with muscle spasm headaches.  Denies weakness, balance issues, bladder bowel continence.  Seen neurosurgery, C-spine MRI reviewed referred for physical therapy and injections.  Chronic axial back pain radiating to bilateral hip without radicular pain worse with standing walking or prolonged activity.  Denies weakness, saddle anesthesia bladder bowel continence.  Tried physical therapy and medication with marginal relief.  Interfering with ADL and sleep.     C-spine MRI  Multilevel degenerative disc disease most notably at C5-C6 and C6-C7 with mild spinal canal stenosis. Moderate right-sided neuroforaminal narrowing at C6-C7 with mild left-sided neuroforaminal narrowing.  L-spine x-ray 2020 Good range of motion with flexion and extension.  No evidence of subluxation. Persistent degenerative disc space narrowing at the L3/4 and L5/T7ckbvsn, unchanged from prior study.   L-spine MRI 2018 degenerative disc disease and mild facet degenerative change. Findings are most pronounced at L2-L3 were is moderate to severe neural foramen narrowing on the left due to the large focal disc extrusion.  mild neural foramen narrowing on the right at the L2-L3 level due to a smaller focal extrusion    Pain Assessment   Location of Pain: Lower Back  Description of Pain: Dull/Aching, Throbbing, Stabbing  Previous Pain Rating :2  Current Pain Ratin  Aggravating Factors: Activity  Alleviating Factors: Rest, Medication    PEG Assessment   What number best describes your pain on average in the past week?4  What number best describes how, during the past week, pain has interfered with your enjoyment of life?5  What number best  "describes how, during the past week, pain has interfered with your general activity?6      The following portions of the patient's history were reviewed and updated as appropriate: allergies, current medications, past family history, past medical history, past social history, past surgical history and problem list.     has a past medical history of Atrial fibrillation (CMS/HCC), Coronary artery disease, Diabetes mellitus (CMS/HCC), Gallstones, Hyperlipidemia, Hypertension, Injury of back, Low back pain, Mitral regurgitation, Rheumatoid arthritis (CMS/HCC), Sleep apnea, and Valvular disease.   has a past surgical history that includes Coronary artery bypass graft (05/11/2012); Coronary angioplasty (2006, 2009); Rotator cuff repair (Left); Cholecystectomy (12/22/2015); Cardiac catheterization (2006, 2009, 2012, 2018); and Back surgery (01/2019).  family history includes Coronary artery disease in his brother and father; Diabetes in his maternal uncle; Heart attack in his brother; Stroke in his brother.  Social History     Tobacco Use   • Smoking status: Never Smoker   • Smokeless tobacco: Never Used   Substance Use Topics   • Alcohol use: No       Review of Systems   Musculoskeletal: Positive for arthralgias, back pain, myalgias, neck pain and neck stiffness.   All other systems reviewed and are negative.      Objective   Physical Exam   Constitutional: No distress.   Pulmonary/Chest: Effort normal.   Musculoskeletal:      Cervical back: He exhibits decreased range of motion and tenderness.      Lumbar back: He exhibits tenderness.   Psychiatric: His behavior is normal.     /78   Pulse (!) 49   Temp 97.2 °F (36.2 °C)   Resp 16   Ht 170.2 cm (67\")   Wt 83.5 kg (184 lb)   SpO2 98%   BMI 28.82 kg/m²      PHQ 9 on chart  Opioid risk tool low risk    Assessment/Plan   Diagnoses and all orders for this visit:    1. Chronic pain syndrome (Primary)  -     tiZANidine (ZANAFLEX) 4 MG tablet; Take 1 tablet by mouth " 2 (Two) Times a Day As Needed for Muscle Spasms.  Dispense: 60 tablet; Refill: 1    2. Cervical spondylosis without myelopathy    3. Cervical radiculitis  -     Epidural Block    4. Postlaminectomy syndrome of lumbar region    Summary  Kaveh Vences is a 69 y.o. male with chronic back pain history of decompression/L2-3 microdiscectomy in January 2019/Dr. Brandon, here for f/u.   Chronic axial back pain from DDD spondylosis.  Chronic back pain from postlaminectomy syndrome and chronic polyarthralgia.  Good relief of lumbar RFA, repeat as needed.    Worsening neck pain from DDD spondylosis with occasional radicular pain.  Interfering with ADL and with sleep.  No relief with physical therapy or anti-inflammatories.  Seen neurosurgery/Dr. Mckinnon, C-spine MRI reviewed referred for physical therapy and injections.    We will schedule for cervical CLARENCE x2.  Needs to be off Plavix 7 days prior to procedure.  Obtain permission from prescriber    Start Zanaflex at bedtime.    RTC  for procedure

## 2021-04-15 ENCOUNTER — TREATMENT (OUTPATIENT)
Dept: PHYSICAL THERAPY | Facility: CLINIC | Age: 71
End: 2021-04-15

## 2021-04-15 DIAGNOSIS — M54.2 CERVICALGIA: Primary | ICD-10-CM

## 2021-04-15 PROCEDURE — 97112 NEUROMUSCULAR REEDUCATION: CPT | Performed by: PHYSICAL THERAPIST

## 2021-04-15 PROCEDURE — 97110 THERAPEUTIC EXERCISES: CPT | Performed by: PHYSICAL THERAPIST

## 2021-04-15 NOTE — PROGRESS NOTES
Physical Therapy Daily Progress Note/Discharge Summary    Patient: Kaveh Vences   : 1950  Referring practitioner: Etienne Mckinnon MD  Today's Date: 4/15/2021    VISIT#: [id number]    Subjective Evaluation    History of Present Illness  Mechanism of injury: Pt rates improvement overall from PT at 70% at least.  He plans to get a new mattress as soon as he can find the time to go shop for one.  He plans to cont with HEP as much as he can.  He is getting his first neck epidural soon.      Pain  Current pain ratin         Objective     See Exercise, Manual, and Modality Logs for complete treatment.     Cont with there ex and NMR as noted; finalized his HEP with new written pictures with instructions issued.    Patient Education:  Final HEP    Assessment & Plan     Assessment  Assessment details: Pt has made good progress with all aspects of PT including strengthening, stretching and motor control.  He is (I) with HEP         STGs:  6 weeks  1.  Pt to tolerate initial HEP without inc pain.  MET  2.  Pt to tolerate advancement of HEP without inc pain.  MET  3.  Pt to report pain has decreased by at least 25%.  MET  4.  Pt to improve  AROM of cervical extension and bilateral rotation  to at least minimal limitation.  MET  5.  Pt to sit with proper posture without cues required.  MET     LTGs:  By DC  1.  Pt to be (I) with HEP.  2.  Pt to report pain has decreased by at least 80% to allow better tolerance to and performance of sleep quality and comfort while lying down in any position.  PART  MET  3.  Pt to improve AROM of cervical spine all planes to min limit to WFL without pain to allow better sleep quality.  PART  MET    4.  Pt to improve head and neck position while sleeping via trying different pillows at home and reporting improvement in pain.  MET  5.  Pt to exhibit improved function as indicated by improved score on NDI vs score at evaluation.  MET  6.  Pt to demonstrate (I) and appropriate use of body  mechanics for daily home and/or work tasks.       Discharge pt today with (I) HEP.              Timed:         Manual Therapy:         mins  81603;     Therapeutic Exercise:  30       mins  92357;     Neuromuscular Summer:  15      mins  18851;    Therapeutic Activity:          mins  22627;     Gait Training:           mins  79463;     Ultrasound:          mins  10857;    Ionto                                   mins   44231  Self Care                            mins   81211  Canalith Repos                   mins  4209    Un-Timed:  Electrical Stimulation:         mins  77160 ( );  Traction          mins 23785  Low Eval          Mins  72837  Mod Eval          Mins  85718  High Eval                            Mins  78776  Re-Eval                               mins  02522    Timed Treatment: 45     mins   Total Treatment:   45     mins    Yanna Newton PT  Physical Therapist

## 2021-05-03 ENCOUNTER — APPOINTMENT (OUTPATIENT)
Dept: PAIN MEDICINE | Facility: HOSPITAL | Age: 71
End: 2021-05-03

## 2021-05-10 ENCOUNTER — HOSPITAL ENCOUNTER (OUTPATIENT)
Dept: PAIN MEDICINE | Facility: HOSPITAL | Age: 71
Discharge: HOME | End: 2021-05-10

## 2021-05-10 VITALS
HEART RATE: 45 BPM | DIASTOLIC BLOOD PRESSURE: 65 MMHG | OXYGEN SATURATION: 97 % | TEMPERATURE: 96.9 F | WEIGHT: 184 LBS | SYSTOLIC BLOOD PRESSURE: 115 MMHG | HEIGHT: 67 IN | BODY MASS INDEX: 28.88 KG/M2 | RESPIRATION RATE: 16 BRPM

## 2021-05-10 DIAGNOSIS — M54.12 CERVICAL RADICULITIS: Primary | ICD-10-CM

## 2021-05-10 DIAGNOSIS — R52 PAIN: ICD-10-CM

## 2021-05-10 PROCEDURE — 25010000002 METHYLPREDNISOLONE PER 40 MG

## 2021-05-10 PROCEDURE — 0 IOPAMIDOL 41 % SOLUTION

## 2021-05-10 PROCEDURE — 0 IOPAMIDOL 41 % SOLUTION: Performed by: ANESTHESIOLOGY

## 2021-05-10 PROCEDURE — 62321 NJX INTERLAMINAR CRV/THRC: CPT | Performed by: ANESTHESIOLOGY

## 2021-05-10 PROCEDURE — 77003 FLUOROGUIDE FOR SPINE INJECT: CPT

## 2021-05-10 RX ORDER — METHYLPREDNISOLONE ACETATE 40 MG/ML
40 INJECTION, SUSPENSION INTRA-ARTICULAR; INTRALESIONAL; INTRAMUSCULAR; SOFT TISSUE ONCE
Status: COMPLETED | OUTPATIENT
Start: 2021-05-10 | End: 2021-05-10

## 2021-05-10 RX ORDER — METHYLPREDNISOLONE ACETATE 40 MG/ML
INJECTION, SUSPENSION INTRA-ARTICULAR; INTRALESIONAL; INTRAMUSCULAR; SOFT TISSUE
Status: DISCONTINUED
Start: 2021-05-10 | End: 2021-05-11 | Stop reason: HOSPADM

## 2021-05-10 RX ADMIN — IOPAMIDOL 3 ML: 408 INJECTION, SOLUTION INTRATHECAL at 11:13

## 2021-05-10 RX ADMIN — METHYLPREDNISOLONE ACETATE 40 MG: 40 INJECTION, SUSPENSION INTRA-ARTICULAR; INTRALESIONAL; INTRAMUSCULAR; SOFT TISSUE at 11:13

## 2021-05-10 NOTE — PROCEDURES
"Subjective    CC neck pain  Kaveh Vences is a 70 y.o. male cervical radiculitis here for cervical CLARENCE.  Off Plavix 7 days.     Pain Assessment   Location of Pain: Lower Back, neck pain, joint  Description of Pain: Dull/Aching, Throbbing, Stabbing  Previous Pain Rating :7  Current Pain Ratin  Aggravating Factors: Activity  Alleviating Factors: Rest, Medication    The following portions of the patient's history were reviewed and updated as appropriate: allergies, current medications, past family history, past medical history, past social history, past surgical history and problem list.      Review of Systems  As in HPI  Objective   Physical Exam  Constitutional:       General: He is not in acute distress.  Cardiovascular:      Rate and Rhythm: Normal rate.   Pulmonary:      Effort: Pulmonary effort is normal.   Musculoskeletal:      Cervical back: Tenderness present.      Lumbar back: Tenderness present. Decreased range of motion.   Neurological:      Mental Status: He is alert and oriented to person, place, and time.       /65 (BP Location: Left arm, Patient Position: Sitting)   Pulse (!) 45   Temp 96.9 °F (36.1 °C) (Skin)   Resp 16   Ht 170.2 cm (67\")   Wt 83.5 kg (184 lb)   SpO2 97%   BMI 28.82 kg/m²     Assessment/Plan    underwent cervical CLARENCE    RTC 4-6 weeks or as needed for repeat    DATE OF PROCEDURE:  5/10/2021    PREOPERATIVE DIAGNOSIS:Cervical radiculitis    POSTOPERATIVE DIAGNOSIS: same    PROCEDURE PERFORMED:  cervical Epidural Steroid Injection    The patient presents with a history of   cervical degenerative disc disease  with  radiculitis. The patient presents today for a  cervical  epidural steroid injection at level C6/7. The patient understands the risks and benefits of the procedure and wishes to proceed.  The patient was seen in the preoperative area.  Patient's consent was obtained and updated.  Vitals were taken.  Patient was then brought to the procedure suite and placed in a " prone position. The appropriate anatomic area was widely prepped with Chloraprep and draped in a sterile fashion. Noninvasive monitoring per routine anesthesia protocol was placed.  Under fluoroscopic guidance using  AP view a 20 gauge styleted tuohy needle was passed through skin anesthetized with 1% Lidocaine without epinephrine.  The needle was advanced using the continuous loss of resistance to saline technique into the C6 epidural space. Needle tip placement in the epidural space was confirmed by loss of resistance and injection of 1 mL of  preservative free contrast.  Following this 7 mL of a solution containing  1 mL of 40 mg Depo-Medrol and 7 mL of preservative-free saline was carefully administered in the epidural space. Epidurogram following injection demonstrated dye spread as high as C4 and as low as T1. A sterile dressing was placed over the puncture site.    The patient tolerated the procedure with no complications . They were then brought to the post procedure area where they recovered nicely.

## 2021-05-24 ENCOUNTER — HOSPITAL ENCOUNTER (OUTPATIENT)
Dept: PAIN MEDICINE | Facility: HOSPITAL | Age: 71
Discharge: HOME | End: 2021-05-24

## 2021-05-24 VITALS
HEIGHT: 67 IN | TEMPERATURE: 98.4 F | RESPIRATION RATE: 16 BRPM | DIASTOLIC BLOOD PRESSURE: 61 MMHG | OXYGEN SATURATION: 96 % | BODY MASS INDEX: 28.88 KG/M2 | SYSTOLIC BLOOD PRESSURE: 113 MMHG | WEIGHT: 184 LBS | HEART RATE: 47 BPM

## 2021-05-24 DIAGNOSIS — R52 PAIN: ICD-10-CM

## 2021-05-24 DIAGNOSIS — M54.12 CERVICAL RADICULITIS: Primary | ICD-10-CM

## 2021-05-24 PROCEDURE — 77003 FLUOROGUIDE FOR SPINE INJECT: CPT

## 2021-05-24 PROCEDURE — 25010000002 METHYLPREDNISOLONE PER 40 MG: Performed by: ANESTHESIOLOGY

## 2021-05-24 PROCEDURE — 62321 NJX INTERLAMINAR CRV/THRC: CPT | Performed by: ANESTHESIOLOGY

## 2021-05-24 PROCEDURE — 0 IOPAMIDOL 41 % SOLUTION: Performed by: ANESTHESIOLOGY

## 2021-05-24 RX ORDER — METHYLPREDNISOLONE ACETATE 40 MG/ML
40 INJECTION, SUSPENSION INTRA-ARTICULAR; INTRALESIONAL; INTRAMUSCULAR; SOFT TISSUE ONCE
Status: COMPLETED | OUTPATIENT
Start: 2021-05-24 | End: 2021-05-24

## 2021-05-24 RX ADMIN — IOPAMIDOL 2.5 ML: 408 INJECTION, SOLUTION INTRATHECAL at 09:21

## 2021-05-24 RX ADMIN — METHYLPREDNISOLONE ACETATE 40 MG: 40 INJECTION, SUSPENSION INTRA-ARTICULAR; INTRALESIONAL; INTRAMUSCULAR; SOFT TISSUE at 09:21

## 2021-05-24 NOTE — PROCEDURES
"Subjective    CC neck pain  Kaveh Vences is a 70 y.o. male cervical radiculitis here for repeat cervical CLARENCE.  Off Plavix 7 days.     Pain Assessment   Location of Pain: Lower Back, neck pain, joint  Description of Pain: Dull/Aching, Throbbing, Stabbing  Previous Pain Rating :7  Current Pain Ratin  Aggravating Factors: Activity  Alleviating Factors: Rest, Medication    The following portions of the patient's history were reviewed and updated as appropriate: allergies, current medications, past family history, past medical history, past social history, past surgical history and problem list.      Review of Systems  As in HPI  Objective   Physical Exam  Constitutional:       General: He is not in acute distress.  Cardiovascular:      Rate and Rhythm: Normal rate.   Pulmonary:      Effort: Pulmonary effort is normal.   Musculoskeletal:      Cervical back: Tenderness present.      Lumbar back: Tenderness present. Decreased range of motion.   Neurological:      Mental Status: He is alert and oriented to person, place, and time.       /61 (BP Location: Left arm, Patient Position: Sitting)   Pulse (!) 49   Temp 98.4 °F (36.9 °C) (Skin)   Resp 16   Ht 170.2 cm (67\")   Wt 83.5 kg (184 lb)   SpO2 95%   BMI 28.82 kg/m²     Assessment/Plan    underwent repeat cervical CLARENCE    RTC 4-6 weeks or as needed for repeat    DATE OF PROCEDURE:  2021    PREOPERATIVE DIAGNOSIS:Cervical radiculitis    POSTOPERATIVE DIAGNOSIS: same    PROCEDURE PERFORMED:  cervical Epidural Steroid Injection    The patient presents with a history of   cervical degenerative disc disease  with  radiculitis. The patient presents today for a  cervical  epidural steroid injection at level C6/7. The patient understands the risks and benefits of the procedure and wishes to proceed.  The patient was seen in the preoperative area.  Patient's consent was obtained and updated.  Vitals were taken.  Patient was then brought to the procedure suite and " placed in a prone position. The appropriate anatomic area was widely prepped with Chloraprep and draped in a sterile fashion. Noninvasive monitoring per routine anesthesia protocol was placed.  Under fluoroscopic guidance using  AP view a 20 gauge styleted tuohy needle was passed through skin anesthetized with 1% Lidocaine without epinephrine.  The needle was advanced using the continuous loss of resistance to saline technique into the C6 epidural space. Needle tip placement in the epidural space was confirmed by loss of resistance and injection of 1 mL of  preservative free contrast.  Following this 7 mL of a solution containing  1 mL of 40 mg Depo-Medrol and 7 mL of preservative-free saline was carefully administered in the epidural space. Epidurogram following injection demonstrated dye spread as high as C4 and as low as T1. A sterile dressing was placed over the puncture site.    The patient tolerated the procedure with no complications . They were then brought to the post procedure area where they recovered nicely.

## 2021-06-22 ENCOUNTER — OFFICE VISIT (OUTPATIENT)
Dept: PAIN MEDICINE | Facility: CLINIC | Age: 71
End: 2021-06-22

## 2021-06-22 VITALS
SYSTOLIC BLOOD PRESSURE: 105 MMHG | OXYGEN SATURATION: 96 % | HEIGHT: 67 IN | BODY MASS INDEX: 28.88 KG/M2 | HEART RATE: 50 BPM | WEIGHT: 184 LBS | DIASTOLIC BLOOD PRESSURE: 63 MMHG | RESPIRATION RATE: 16 BRPM

## 2021-06-22 DIAGNOSIS — G89.4 CHRONIC PAIN SYNDROME: Primary | ICD-10-CM

## 2021-06-22 DIAGNOSIS — M47.812 CERVICAL SPONDYLOSIS WITHOUT MYELOPATHY: ICD-10-CM

## 2021-06-22 DIAGNOSIS — M25.50 POLYARTHRALGIA: ICD-10-CM

## 2021-06-22 DIAGNOSIS — M96.1 POSTLAMINECTOMY SYNDROME OF LUMBAR REGION: ICD-10-CM

## 2021-06-22 PROCEDURE — 99214 OFFICE O/P EST MOD 30 MIN: CPT | Performed by: ANESTHESIOLOGY

## 2021-06-22 RX ORDER — TIZANIDINE 4 MG/1
4 TABLET ORAL 2 TIMES DAILY PRN
Qty: 60 TABLET | Refills: 1 | Status: SHIPPED | OUTPATIENT
Start: 2021-06-22 | End: 2021-09-14 | Stop reason: SDUPTHER

## 2021-06-22 RX ORDER — AMLODIPINE BESYLATE 5 MG/1
TABLET ORAL
Qty: 90 TABLET | Refills: 0 | Status: SHIPPED | OUTPATIENT
Start: 2021-06-22 | End: 2021-10-06

## 2021-06-22 NOTE — PROGRESS NOTES
Subjective    CC Back pain  Kaveh Vences is a 70 y.o. male with chronic back pain history of decompression/L2-3 microdiscectomy in 2019/Dr. Brandon, here for f/u.  Cervical CLARENCE x2 reports good relief.  Chronic neck pain radiating to head and bilateral shoulders worse at night and associated with muscle spasm headaches.  Denies weakness, balance issues, bladder bowel continence.    Chronic axial back pain radiating to bilateral hip without radicular pain worse with standing walking or prolonged activity.  Denies weakness, saddle anesthesia bladder bowel continence.  Tried physical therapy and medication with marginal relief.  Interfering with ADL and sleep.   Seen neurosurgery, no surgery indicated.    C-spine MRI  Multilevel degenerative disc disease most notably at C5-C6 and C6-C7 with mild spinal canal stenosis. Moderate right-sided neuroforaminal narrowing at C6-C7 with mild left-sided neuroforaminal narrowing.  L-spine x-ray  Good range of motion with flexion and extension.  No evidence of subluxation. Persistent degenerative disc space narrowing at the L3/4 and L5/E2jyobta, unchanged from prior study.   L-spine MRI  degenerative disc disease and mild facet degenerative change. Findings are most pronounced at L2-L3 were is moderate to severe neural foramen narrowing on the left due to the large focal disc extrusion.  mild neural foramen narrowing on the right at the L2-L3 level due to a smaller focal extrusion    Pain Assessment   Location of Pain: Lower Back  Description of Pain: Dull/Aching, Throbbing, Stabbing  Previous Pain Rating :4  Current Pain Ratin  Aggravating Factors: Activity  Alleviating Factors: Rest, Medication    PEG Assessment   What number best describes your pain on average in the past week?1  What number best describes how, during the past week, pain has interfered with your enjoyment of life?0  What number best describes how, during the past week, pain has interfered  "with your general activity?5      The following portions of the patient's history were reviewed and updated as appropriate: allergies, current medications, past family history, past medical history, past social history, past surgical history and problem list.     has a past medical history of Atrial fibrillation (CMS/HCC), Coronary artery disease, Diabetes mellitus (CMS/HCC), Gallstones, Hyperlipidemia, Hypertension, Injury of back, Low back pain, Mitral regurgitation, Rheumatoid arthritis (CMS/HCC), Sleep apnea, and Valvular disease.   has a past surgical history that includes Coronary artery bypass graft (05/11/2012); Coronary angioplasty (2006, 2009); Rotator cuff repair (Left); Cholecystectomy (12/22/2015); Cardiac catheterization (2006, 2009, 2012, 2018); and Back surgery (01/2019).  family history includes Coronary artery disease in his brother and father; Diabetes in his maternal uncle; Heart attack in his brother; Stroke in his brother.  Social History     Tobacco Use   • Smoking status: Never Smoker   • Smokeless tobacco: Never Used   Substance Use Topics   • Alcohol use: No       Review of Systems   Musculoskeletal: Positive for arthralgias, back pain, myalgias, neck pain and neck stiffness.   All other systems reviewed and are negative.      Objective   Physical Exam   Constitutional: No distress.   Pulmonary/Chest: Effort normal.   Musculoskeletal:      Cervical back: He exhibits decreased range of motion and tenderness.      Lumbar back: He exhibits tenderness.   Psychiatric: His behavior is normal.     /63   Pulse 50   Resp 16   Ht 170.2 cm (67\")   Wt 83.5 kg (184 lb)   SpO2 96%   BMI 28.82 kg/m²      PHQ 9 on chart  Opioid risk tool low risk    Assessment/Plan   Diagnoses and all orders for this visit:    1. Chronic pain syndrome (Primary)  -     tiZANidine (ZANAFLEX) 4 MG tablet; Take 1 tablet by mouth 2 (Two) Times a Day As Needed for Muscle Spasms.  Dispense: 60 tablet; Refill: 1    2. " Cervical spondylosis without myelopathy    3. Postlaminectomy syndrome of lumbar region    4. Polyarthralgia    Summary  Kaveh Vences is a 69 y.o. male with chronic back pain history of decompression/L2-3 microdiscectomy in January 2019/Dr. Brandon, here for f/u.   Chronic axial back pain from DDD spondylosis.  Chronic back pain from postlaminectomy syndrome and chronic polyarthralgia.    Good relief of lumbar RFA and cervical CLARENCE, repeat as needed.  Needs to be off Plavix 7 days prior to procedure.      Continue  Zanaflex at bedtime.    RTC 3 months or for procedure

## 2021-06-29 ENCOUNTER — OFFICE VISIT (OUTPATIENT)
Dept: CARDIOLOGY | Facility: CLINIC | Age: 71
End: 2021-06-29

## 2021-06-29 VITALS
RESPIRATION RATE: 18 BRPM | HEIGHT: 67 IN | OXYGEN SATURATION: 98 % | DIASTOLIC BLOOD PRESSURE: 70 MMHG | BODY MASS INDEX: 28.41 KG/M2 | SYSTOLIC BLOOD PRESSURE: 125 MMHG | WEIGHT: 181 LBS | HEART RATE: 49 BPM

## 2021-06-29 DIAGNOSIS — I25.718 CORONARY ARTERY DISEASE OF AUTOLOGOUS VEIN BYPASS GRAFT WITH STABLE ANGINA PECTORIS (HCC): ICD-10-CM

## 2021-06-29 DIAGNOSIS — I50.30 CONGESTIVE HEART FAILURE WITH LV DIASTOLIC DYSFUNCTION, NYHA CLASS 3 (HCC): ICD-10-CM

## 2021-06-29 DIAGNOSIS — I25.5 ISCHEMIC CARDIOMYOPATHY: ICD-10-CM

## 2021-06-29 DIAGNOSIS — Z95.1 S/P CABG X 4: Primary | ICD-10-CM

## 2021-06-29 DIAGNOSIS — I25.708 ATHEROSCLEROSIS OF CORONARY ARTERY BYPASS GRAFT(S), UNSPECIFIED, WITH OTHER FORMS OF ANGINA PECTORIS (HCC): ICD-10-CM

## 2021-06-29 DIAGNOSIS — I48.0 PAROXYSMAL ATRIAL FIBRILLATION (HCC): ICD-10-CM

## 2021-06-29 PROCEDURE — 93000 ELECTROCARDIOGRAM COMPLETE: CPT | Performed by: INTERNAL MEDICINE

## 2021-06-29 PROCEDURE — 99214 OFFICE O/P EST MOD 30 MIN: CPT | Performed by: INTERNAL MEDICINE

## 2021-06-29 NOTE — PROGRESS NOTES
Cardiology Office Visit      Encounter Date:  06/29/2021    Patient ID:   Kaveh Vences is a 70 y.o. male.    Reason For Followup:  Coronary artery disease  Hypertension  Hyperlipidemia      Brief Clinical History:  Dear Skinny Harper MD    I had the pleasure of seeing Kaveh Vences today. As you are well aware, this is a 70 y.o. male with history of diabetes,  coronary artery disease, status post previous CABG in 2012, and PCI stenting.     cardiac catheterization with graft angiography showed LV ejection fraction of 40%, severe native three-vessel coronary disease with 90% calcified stenosis involving the distal left main involving the ostium of the LAD significant 99% lesion of left circumflex coronary artery.  100% occlusion of the nondominant right coronary artery was noted as well.  Ashby to the LAD was patent and saphenous vein graft to marginal branch was patent as well. Echocardiogram with LV ejection fraction of 45% with moderate mitral regurgitation      Interval History:  Denies any new complaints  No further chest pain  No unstable symptoms  Dizziness and shortness of breath have improved    Interpretation Summary    · Left ventricular wall thickness is consistent with moderate asymmetric hypertrophy.  · Estimated left ventricular EF = 50% Estimated left ventricular EF was in agreement with the calculated left ventricular EF. Left ventricular systolic function is normal.  · Estimated right ventricular systolic pressure from tricuspid regurgitation is normal (<35 mmHg).  · Moderate dilation of the aortic root is present. Moderate dilation of the ascending aorta is present.  · Left ventricular diastolic function is consistent with (grade I) impaired relaxation.        Interpretation Summary    · Mid right internal carotid artery mild stenosis.  · Mid left internal carotid artery mild stenosis.  · Study Impression: • Right ICA Mid: Imaging indicates 16%-49% stenosis. • Left ICA Mid: Imaging indicates  "16-49% stenosis.           Assessment & Plan    Impressions:  Coronary artery disease status post coronary artery bypass surgery  Hypertension  Hyperlipidemia  Sinus bradycardia with a left bundle branch block  Peripheral vascular disease/altered moderate obstructive carotid artery disease  Mild to moderate mitral regurgitation  Diabetes mellitus    Recommendations:  Recent evaluation at [facility] with a repeat cardiac catheterization with no significant change in the anatomy compared to before opted for medical therapy  Repeat echocardiogram with only mild to moderate mitral regurgitation somewhat better from before  Continue Ranexa 1000 mg at night  Good functional capacity  Cardiogram discussed with the patient  Labs with the primary care physician office  Plan treat patient conservatively from cardiac standpoint  Continue current medical therapy  Patient is already on maximal medical therapy  Continue close monitoring  Results reviewed and discussed with patient  Labs reviewed and discussed with the patient  Labs with primary care physician office  Consider repeat carotid ultrasound next year  Follow-up in office 6 months    Objective:    Vitals:  Vitals:    06/29/21 0919   BP: 125/70   BP Location: Left arm   Pulse: (!) 49   Resp: 18   SpO2: 98%   Weight: 82.1 kg (181 lb)   Height: 170.2 cm (67\")       Physical Exam:    General: Alert, cooperative, no distress, appears stated age  Head:  Normocephalic, atraumatic, mucous membranes moist  Eyes:  Conjunctiva/corneas clear, EOM's intact     Neck:  Supple,  no adenopathy;      Lungs: Clear to auscultation bilaterally, no wheezes rhonchi rales are noted  Chest wall: No tenderness  Heart::  Regular rate and rhythm, S1 and S2 normal, no murmur, rub or gallop  Abdomen: Soft, non-tender, nondistended bowel sounds active  Extremities: No cyanosis, clubbing, or edema  Pulses: 2+ and symmetric all extremities  Skin:  No rashes or lesions  Neuro/psych: A&O x3. CN II " through XII are grossly intact with appropriate affect      Allergies:  No Known Allergies    Medication Review:     Current Outpatient Medications:   •  amLODIPine (NORVASC) 5 MG tablet, Take 1 tablet by mouth once daily, Disp: 90 tablet, Rfl: 0  •  aspirin 81 MG EC tablet, Take 81 mg by mouth Daily., Disp: , Rfl:   •  atorvastatin (LIPITOR) 20 MG tablet, Take 1 tablet by mouth Daily., Disp: 90 tablet, Rfl: 3  •  clopidogrel (PLAVIX) 75 MG tablet, Take 1 tablet by mouth Daily., Disp: 90 tablet, Rfl: 3  •  isosorbide mononitrate (IMDUR) 30 MG 24 hr tablet, Take 1 tablet by mouth Daily., Disp: 90 tablet, Rfl: 3  •  lisinopril (PRINIVIL,ZESTRIL) 20 MG tablet, Take 1 tablet by mouth 2 (Two) Times a Day., Disp: 180 tablet, Rfl: 3  •  metFORMIN ER (GLUCOPHAGE-XR) 500 MG 24 hr tablet, TAKE 2 TABLETS BY MOUTH TWICE DAILY FOR 30 DAYS, Disp: , Rfl:   •  metoprolol tartrate (LOPRESSOR) 50 MG tablet, Take 1 tablet by mouth 2 (Two) Times a Day., Disp: 180 tablet, Rfl: 3  •  nitroglycerin (NITROSTAT) 0.4 MG SL tablet, Place 1 tablet under the tongue Every 5 (Five) Minutes As Needed for Chest Pain. Take no more than 3 doses in 15 minutes., Disp: 30 tablet, Rfl: 4  •  ranolazine (Ranexa) 1000 MG 12 hr tablet, Take 1 tablet by mouth Every 12 (Twelve) Hours., Disp: 180 tablet, Rfl: 3  •  tiZANidine (ZANAFLEX) 4 MG tablet, Take 1 tablet by mouth 2 (Two) Times a Day As Needed for Muscle Spasms., Disp: 60 tablet, Rfl: 1    Family History:  Family History   Problem Relation Age of Onset   • Coronary artery disease Father    • Coronary artery disease Brother    • Heart attack Brother    • Stroke Brother    • Diabetes Maternal Uncle        Past Medical History:  Past Medical History:   Diagnosis Date   • Atrial fibrillation (CMS/HCC)    • Coronary artery disease    • Diabetes mellitus (CMS/HCC)    • Gallstones    • Hyperlipidemia    • Hypertension    • Injury of back    • Low back pain    • Mitral regurgitation    • Rheumatoid arthritis  (CMS/[facility])    • Sleep apnea    • Valvular disease        Past surgical History:  Past Surgical History:   Procedure Laterality Date   • BACK SURGERY  01/2019   • CARDIAC CATHETERIZATION  2006, 2009, 2012, 2018   • CHOLECYSTECTOMY  12/22/2015   • CORONARY ANGIOPLASTY  2006, 2009   • CORONARY ARTERY BYPASS GRAFT  05/11/2012    x2   • ROTATOR CUFF REPAIR Left        Social History:  Social History     Socioeconomic History   • Marital status:      Spouse name: Not on file   • Number of children: Not on file   • Years of education: Not on file   • Highest education level: Not on file   Tobacco Use   • Smoking status: Never Smoker   • Smokeless tobacco: Never Used   Vaping Use   • Vaping Use: Never used   Substance and Sexual Activity   • Alcohol use: No   • Drug use: No   • Sexual activity: Defer       Review of Systems:  The following systems were reviewed as they relate to the cardiovascular system: Constitutional, Eyes, ENT, Cardiovascular, Respiratory, Gastrointestinal, Integumentary, Neurological, Psychiatric, Hematologic, Endocrine, Musculoskeletal, and Genitourinary. The pertinent cardiovascular findings are reported above with all other cardiovascular points within those systems being negative.    Diagnostic Study Review:     Current Electrocardiogram:    ECG 12 Lead    Date/Time: 6/29/2021 9:51 AM  Performed by: Cris Mcneill MD  Authorized by: Cris Mcneill MD   Comparison: compared with previous ECG   Similar to previous ECG  Rhythm: sinus rhythm and sinus bradycardia  Rate: normal  BPM: 49  Conduction: non-specific intraventricular conduction delay  QRS axis: normal  Other findings: non-specific ST-T wave changes    Clinical impression: abnormal EKG              NOTE: The following portions of the patient's history were reviewed and updated this visit as appropriate: allergies, current medications, past family history, past medical history, past social history, past surgical history  and problem list.

## 2021-09-14 ENCOUNTER — OFFICE VISIT (OUTPATIENT)
Dept: PAIN MEDICINE | Facility: CLINIC | Age: 71
End: 2021-09-14

## 2021-09-14 VITALS
HEIGHT: 67 IN | WEIGHT: 181 LBS | RESPIRATION RATE: 16 BRPM | DIASTOLIC BLOOD PRESSURE: 68 MMHG | OXYGEN SATURATION: 95 % | HEART RATE: 55 BPM | BODY MASS INDEX: 28.41 KG/M2 | SYSTOLIC BLOOD PRESSURE: 134 MMHG

## 2021-09-14 DIAGNOSIS — M47.812 CERVICAL SPONDYLOSIS WITHOUT MYELOPATHY: ICD-10-CM

## 2021-09-14 DIAGNOSIS — M96.1 POSTLAMINECTOMY SYNDROME OF LUMBAR REGION: ICD-10-CM

## 2021-09-14 DIAGNOSIS — M47.817 LUMBOSACRAL SPONDYLOSIS WITHOUT MYELOPATHY: ICD-10-CM

## 2021-09-14 DIAGNOSIS — G89.4 CHRONIC PAIN SYNDROME: Primary | ICD-10-CM

## 2021-09-14 PROCEDURE — 99214 OFFICE O/P EST MOD 30 MIN: CPT | Performed by: ANESTHESIOLOGY

## 2021-09-14 RX ORDER — TIZANIDINE 4 MG/1
4 TABLET ORAL 2 TIMES DAILY PRN
Qty: 60 TABLET | Refills: 1 | Status: SHIPPED | OUTPATIENT
Start: 2021-09-14 | End: 2022-03-08

## 2021-09-14 NOTE — PROGRESS NOTES
Subjective    CC Back pain  Kaveh Vences is a 70 y.o. male with chronic back pain history of decompression/L2-3 microdiscectomy in 2019/Dr. Brandon, here for f/u.  Continues to have good relief since cervical CLARENCE and lumbar RFA.  Utilizing Zanaflex as needed.  Chronic axial back pain radiating to bilateral hip without radicular pain worse with standing walking or prolonged activity.  Denies weakness, saddle anesthesia bladder bowel continence.  Chronic neck pain radiating to head and bilateral shoulders worse at night and associated with muscle spasm headaches.  Denies weakness, balance issues, bladder bowel continence.    Tried physical therapy and medication with marginal relief.  Interfering with ADL and sleep.   Seen neurosurgery, no surgery recommended.    C-spine MRI  Multilevel degenerative disc disease most notably at C5-C6 and C6-C7 with mild spinal canal stenosis. Moderate right-sided neuroforaminal narrowing at C6-C7 with mild left-sided neuroforaminal narrowing.  L-spine x-ray  Good range of motion with flexion and extension.  No evidence of subluxation. Persistent degenerative disc space narrowing at the L3/4 and L5/H6xofjuz, unchanged from prior study.   L-spine MRI  degenerative disc disease and mild facet degenerative change. Findings are most pronounced at L2-L3 were is moderate to severe neural foramen narrowing on the left due to the large focal disc extrusion.  mild neural foramen narrowing on the right at the L2-L3 level due to a smaller focal extrusion    Pain Assessment   Location of Pain: Lower Back  Description of Pain: Dull/Aching, Throbbing, Stabbing  Previous Pain Rating :1  Current Pain Ratin  Aggravating Factors: Activity  Alleviating Factors: Rest, Medication    PEG Assessment   What number best describes your pain on average in the past week?1  What number best describes how, during the past week, pain has interfered with your enjoyment of life?1  What number  "best describes how, during the past week, pain has interfered with your general activity?6      The following portions of the patient's history were reviewed and updated as appropriate: allergies, current medications, past family history, past medical history, past social history, past surgical history and problem list.     has a past medical history of Atrial fibrillation (CMS/HCC), Coronary artery disease, Diabetes mellitus (CMS/HCC), Gallstones, Hyperlipidemia, Hypertension, Injury of back, Low back pain, Mitral regurgitation, Rheumatoid arthritis (CMS/HCC), Sleep apnea, and Valvular disease.   has a past surgical history that includes Coronary artery bypass graft (05/11/2012); Coronary angioplasty (2006, 2009); Rotator cuff repair (Left); Cholecystectomy (12/22/2015); Cardiac catheterization (2006, 2009, 2012, 2018); and Back surgery (01/2019).  family history includes Coronary artery disease in his brother and father; Diabetes in his maternal uncle; Heart attack in his brother; Stroke in his brother.  Social History     Tobacco Use   • Smoking status: Never Smoker   • Smokeless tobacco: Never Used   Substance Use Topics   • Alcohol use: No       Review of Systems   Musculoskeletal: Positive for arthralgias, back pain, myalgias, neck pain and neck stiffness.   All other systems reviewed and are negative.      Objective   Physical Exam   Constitutional: No distress.   Pulmonary/Chest: Effort normal.   Musculoskeletal:      Cervical back: He exhibits decreased range of motion and tenderness.      Lumbar back: He exhibits tenderness.   Vitals reviewed.    /68   Pulse 55   Resp 16   Ht 170.2 cm (67.01\")   Wt 82.1 kg (181 lb)   SpO2 95%   BMI 28.34 kg/m²      PHQ 9 on chart  Opioid risk tool low risk    Assessment/Plan   Diagnoses and all orders for this visit:    1. Chronic pain syndrome (Primary)  -     tiZANidine (ZANAFLEX) 4 MG tablet; Take 1 tablet by mouth 2 (Two) Times a Day As Needed for Muscle " Spasms.  Dispense: 60 tablet; Refill: 1    2. Cervical spondylosis without myelopathy    3. Postlaminectomy syndrome of lumbar region    4. Lumbosacral spondylosis without myelopathy      Summary  Kaveh Vences is a 69 y.o. male with chronic back pain history of decompression/L2-3 microdiscectomy in January 2019/Dr. Brandon, here for f/u.   Chronic axial back pain from DDD spondylosis.  Chronic back pain from postlaminectomy syndrome and chronic polyarthralgia.    Continues to have good relief since cervical CLARENCE and lumbar RFA.  Utilizing Zanaflex as needed.      Repeat cervical CLARENCE or lumbar RFA as needed.  Needs to be off Plavix.    Continue  Zanaflex at bedtime.    RTC 6 months or for procedure

## 2021-09-23 DIAGNOSIS — Z95.1 S/P CABG X 4: Primary | ICD-10-CM

## 2021-09-23 DIAGNOSIS — I25.5 ISCHEMIC CARDIOMYOPATHY: ICD-10-CM

## 2021-09-23 DIAGNOSIS — I50.30 CONGESTIVE HEART FAILURE WITH LV DIASTOLIC DYSFUNCTION, NYHA CLASS 3 (HCC): ICD-10-CM

## 2021-09-23 DIAGNOSIS — I25.718 CORONARY ARTERY DISEASE OF AUTOLOGOUS VEIN BYPASS GRAFT WITH STABLE ANGINA PECTORIS (HCC): ICD-10-CM

## 2021-09-28 ENCOUNTER — HOSPITAL ENCOUNTER (OUTPATIENT)
Dept: CARDIOLOGY | Facility: HOSPITAL | Age: 71
Discharge: HOME | End: 2021-09-28

## 2021-09-28 DIAGNOSIS — I50.30 CONGESTIVE HEART FAILURE WITH LV DIASTOLIC DYSFUNCTION, NYHA CLASS 3 (HCC): ICD-10-CM

## 2021-09-28 DIAGNOSIS — I25.718 CORONARY ARTERY DISEASE OF AUTOLOGOUS VEIN BYPASS GRAFT WITH STABLE ANGINA PECTORIS (HCC): ICD-10-CM

## 2021-09-28 DIAGNOSIS — Z95.1 S/P CABG X 4: ICD-10-CM

## 2021-09-28 DIAGNOSIS — I25.5 ISCHEMIC CARDIOMYOPATHY: ICD-10-CM

## 2021-10-06 RX ORDER — AMLODIPINE BESYLATE 5 MG/1
TABLET ORAL
Qty: 90 TABLET | Refills: 2 | Status: SHIPPED | OUTPATIENT
Start: 2021-10-06 | End: 2022-04-05

## 2021-11-05 LAB
MAXIMAL PREDICTED HEART RATE: 149 BPM
STRESS TARGET HR: 127 BPM

## 2021-11-05 PROCEDURE — 93228 REMOTE 30 DAY ECG REV/REPORT: CPT | Performed by: INTERNAL MEDICINE

## 2021-11-08 ENCOUNTER — TELEPHONE (OUTPATIENT)
Dept: CARDIOLOGY | Facility: CLINIC | Age: 71
End: 2021-11-08

## 2021-11-08 NOTE — TELEPHONE ENCOUNTER
Called and notified patient per Dr Mcneill that Holter monitor study was normal.    -----------------------    Cris Mcneill MD Trent, Melissa, MA    Normal Holter monitor study

## 2022-01-12 ENCOUNTER — OFFICE VISIT (OUTPATIENT)
Dept: CARDIOLOGY | Facility: CLINIC | Age: 72
End: 2022-01-12

## 2022-01-12 VITALS
WEIGHT: 187 LBS | RESPIRATION RATE: 18 BRPM | SYSTOLIC BLOOD PRESSURE: 127 MMHG | DIASTOLIC BLOOD PRESSURE: 73 MMHG | HEART RATE: 50 BPM | HEIGHT: 67 IN | BODY MASS INDEX: 29.35 KG/M2 | OXYGEN SATURATION: 95 %

## 2022-01-12 DIAGNOSIS — I48.0 PAROXYSMAL ATRIAL FIBRILLATION: ICD-10-CM

## 2022-01-12 DIAGNOSIS — I25.718 CORONARY ARTERY DISEASE OF AUTOLOGOUS VEIN BYPASS GRAFT WITH STABLE ANGINA PECTORIS: ICD-10-CM

## 2022-01-12 DIAGNOSIS — I25.5 ISCHEMIC CARDIOMYOPATHY: ICD-10-CM

## 2022-01-12 DIAGNOSIS — I34.0 MODERATE MITRAL REGURGITATION: ICD-10-CM

## 2022-01-12 DIAGNOSIS — I25.708 ATHEROSCLEROSIS OF CORONARY ARTERY BYPASS GRAFT(S), UNSPECIFIED, WITH OTHER FORMS OF ANGINA PECTORIS: ICD-10-CM

## 2022-01-12 DIAGNOSIS — I38 VALVULAR HEART DISEASE: Primary | ICD-10-CM

## 2022-01-12 DIAGNOSIS — I50.30 CONGESTIVE HEART FAILURE WITH LV DIASTOLIC DYSFUNCTION, NYHA CLASS 3: ICD-10-CM

## 2022-01-12 DIAGNOSIS — I25.718 ATHEROSCLEROSIS OF AUTOLOGOUS VEIN CORONARY ARTERY BYPASS GRAFT(S) WITH OTHER FORMS OF ANGINA PECTORIS: ICD-10-CM

## 2022-01-12 DIAGNOSIS — I44.7 LEFT BUNDLE BRANCH BLOCK: ICD-10-CM

## 2022-01-12 DIAGNOSIS — Z95.1 S/P CABG X 4: ICD-10-CM

## 2022-01-12 DIAGNOSIS — I34.0 NONRHEUMATIC MITRAL VALVE REGURGITATION: ICD-10-CM

## 2022-01-12 PROCEDURE — 93000 ELECTROCARDIOGRAM COMPLETE: CPT | Performed by: INTERNAL MEDICINE

## 2022-01-12 PROCEDURE — 99214 OFFICE O/P EST MOD 30 MIN: CPT | Performed by: INTERNAL MEDICINE

## 2022-03-08 ENCOUNTER — OFFICE VISIT (OUTPATIENT)
Dept: PAIN MEDICINE | Facility: CLINIC | Age: 72
End: 2022-03-08

## 2022-03-08 VITALS
BODY MASS INDEX: 29.35 KG/M2 | DIASTOLIC BLOOD PRESSURE: 63 MMHG | RESPIRATION RATE: 16 BRPM | HEART RATE: 48 BPM | SYSTOLIC BLOOD PRESSURE: 125 MMHG | OXYGEN SATURATION: 98 % | HEIGHT: 67 IN | WEIGHT: 187 LBS

## 2022-03-08 DIAGNOSIS — M47.817 LUMBOSACRAL SPONDYLOSIS WITHOUT MYELOPATHY: ICD-10-CM

## 2022-03-08 DIAGNOSIS — M47.812 CERVICAL SPONDYLOSIS WITHOUT MYELOPATHY: ICD-10-CM

## 2022-03-08 DIAGNOSIS — G89.4 CHRONIC PAIN SYNDROME: Primary | ICD-10-CM

## 2022-03-08 DIAGNOSIS — M96.1 POSTLAMINECTOMY SYNDROME OF LUMBAR REGION: ICD-10-CM

## 2022-03-08 PROCEDURE — 99214 OFFICE O/P EST MOD 30 MIN: CPT | Performed by: ANESTHESIOLOGY

## 2022-03-08 NOTE — PROGRESS NOTES
Subjective    CC Back pain  Kaveh Vences is a 71 y.o. male with chronic back pain history of decompression/L2-3 microdiscectomy in 2019/Dr. Brandon, here for f/u.  Complains of worsening axial back pain.  Had good relief with lumbar RFA lasted several months now symptoms have returned.  Chronic axial back pain radiating to bilateral hip without radicular pain worse with standing walking or prolonged activity.  Denies weakness, saddle anesthesia bladder bowel continence.  Chronic neck pain radiating to head and bilateral shoulders worse at night and associated with muscle spasm headaches.  Denies weakness, balance issues, bladder bowel continence.    Tried physical therapy and medication with marginal relief.  Interfering with ADL and sleep.   Seen neurosurgery, no surgery recommended.    C-spine MRI  Multilevel degenerative disc disease most notably at C5-C6 and C6-C7 with mild spinal canal stenosis. Moderate right-sided neuroforaminal narrowing at C6-C7 with mild left-sided neuroforaminal narrowing.  L-spine x-ray  Good range of motion with flexion and extension.  No evidence of subluxation. Persistent degenerative disc space narrowing at the L3/4 and L5/R6gzplgj, unchanged from prior study.   L-spine MRI  degenerative disc disease and mild facet degenerative change. Findings are most pronounced at L2-L3 were is moderate to severe neural foramen narrowing on the left due to the large focal disc extrusion.  mild neural foramen narrowing on the right at the L2-L3 level due to a smaller focal extrusion    Pain Assessment   Location of Pain: Lower Back  Description of Pain: Dull/Aching, Throbbing, Stabbing  Previous Pain Rating :1  Current Pain Ratin  Aggravating Factors: Activity  Alleviating Factors: Rest, Medication    PEG Assessment   What number best describes your pain on average in the past week?1  What number best describes how, during the past week, pain has interfered with your enjoyment  "of life?1  What number best describes how, during the past week, pain has interfered with your general activity?8     The following portions of the patient's history were reviewed and updated as appropriate: allergies, current medications, past family history, past medical history, past social history, past surgical history and problem list.     has a past medical history of Atrial fibrillation (HCC), Coronary artery disease, Diabetes mellitus (HCC), Gallstones, Hyperlipidemia, Hypertension, Injury of back, Low back pain, Mitral regurgitation, Neck pain, Rheumatoid arthritis (HCC), Sleep apnea, and Valvular disease.   has a past surgical history that includes Coronary artery bypass graft (05/11/2012); Coronary angioplasty (2006, 2009); Rotator cuff repair (Left); Cholecystectomy (12/22/2015); Cardiac catheterization (2006, 2009, 2012, 2018); and Back surgery (01/2019).  family history includes Coronary artery disease in his brother and father; Diabetes in his maternal uncle; Heart attack in his brother; Stroke in his brother.  Social History     Tobacco Use   • Smoking status: Never Smoker   • Smokeless tobacco: Never Used   Substance Use Topics   • Alcohol use: No       Review of Systems   Musculoskeletal: Positive for arthralgias, back pain, myalgias, neck pain and neck stiffness.   All other systems reviewed and are negative.      Objective   Physical Exam   Constitutional: No distress.   Pulmonary/Chest: Effort normal.   Musculoskeletal:      Cervical back: He exhibits decreased range of motion and tenderness.      Lumbar back: He exhibits tenderness.   Vitals reviewed.    /63   Pulse (!) 48   Resp 16   Ht 170.2 cm (67\")   Wt 84.8 kg (187 lb)   SpO2 98%   BMI 29.29 kg/m²      PHQ 9 on chart  Opioid risk tool low risk    Assessment/Plan   Diagnoses and all orders for this visit:    1. Chronic pain syndrome (Primary)    2. Cervical spondylosis without myelopathy    3. Postlaminectomy syndrome of lumbar " region    4. Lumbosacral spondylosis without myelopathy  -     Facet    Summary  Kaveh Vences is a 71 y.o. male with chronic back pain history of decompression/L2-3 microdiscectomy in January 2019/Dr. Brandon, here for f/u.   Chronic axial back pain from DDD spondylosis.  Chronic back pain from postlaminectomy syndrome and chronic polyarthralgia.    Continues to have good relief since cervical CLARENCE and lumbar RFA.  Utilizing Zanaflex as needed.    Complains of worsening axial back pain.  Had good relief (80%) with lumbar RFA lasted several months now symptoms have returned.  We will schedule for repeat right and left lumbar RFA at L4/5, L5/S1 without sedation.  Needs to be off Plavix 7 days prior to procedure.    Continue  Zanaflex at as needed bedtime.    RTC for procedure

## 2022-04-05 RX ORDER — ATORVASTATIN CALCIUM 20 MG/1
TABLET, FILM COATED ORAL
Qty: 90 TABLET | Refills: 0 | Status: SHIPPED | OUTPATIENT
Start: 2022-04-05 | End: 2022-05-04

## 2022-04-05 RX ORDER — RANOLAZINE 1000 MG/1
TABLET, EXTENDED RELEASE ORAL
Qty: 180 TABLET | Refills: 0 | Status: SHIPPED | OUTPATIENT
Start: 2022-04-05 | End: 2022-07-13

## 2022-04-05 RX ORDER — AMLODIPINE BESYLATE 5 MG/1
TABLET ORAL
Qty: 90 TABLET | Refills: 0 | Status: SHIPPED | OUTPATIENT
Start: 2022-04-05 | End: 2022-10-11

## 2022-04-15 ENCOUNTER — HOSPITAL ENCOUNTER (OUTPATIENT)
Dept: PAIN MEDICINE | Facility: HOSPITAL | Age: 72
Discharge: HOME | End: 2022-04-15

## 2022-04-15 VITALS
TEMPERATURE: 97.5 F | RESPIRATION RATE: 16 BRPM | HEIGHT: 67 IN | HEART RATE: 50 BPM | OXYGEN SATURATION: 97 % | BODY MASS INDEX: 29.35 KG/M2 | SYSTOLIC BLOOD PRESSURE: 121 MMHG | DIASTOLIC BLOOD PRESSURE: 69 MMHG | WEIGHT: 187 LBS

## 2022-04-15 DIAGNOSIS — R52 PAIN: ICD-10-CM

## 2022-04-15 DIAGNOSIS — M47.817 LUMBOSACRAL SPONDYLOSIS WITHOUT MYELOPATHY: Primary | ICD-10-CM

## 2022-04-15 PROCEDURE — 25010000002 METHYLPREDNISOLONE PER 40 MG: Performed by: ANESTHESIOLOGY

## 2022-04-15 PROCEDURE — 64636 DESTROY L/S FACET JNT ADDL: CPT | Performed by: ANESTHESIOLOGY

## 2022-04-15 PROCEDURE — 64635 DESTROY LUMB/SAC FACET JNT: CPT | Performed by: ANESTHESIOLOGY

## 2022-04-15 PROCEDURE — 77003 FLUOROGUIDE FOR SPINE INJECT: CPT

## 2022-04-15 RX ORDER — BUPIVACAINE HYDROCHLORIDE 2.5 MG/ML
10 INJECTION, SOLUTION EPIDURAL; INFILTRATION; INTRACAUDAL ONCE
Status: COMPLETED | OUTPATIENT
Start: 2022-04-15 | End: 2022-04-15

## 2022-04-15 RX ORDER — METHYLPREDNISOLONE ACETATE 40 MG/ML
40 INJECTION, SUSPENSION INTRA-ARTICULAR; INTRALESIONAL; INTRAMUSCULAR; SOFT TISSUE ONCE
Status: COMPLETED | OUTPATIENT
Start: 2022-04-15 | End: 2022-04-15

## 2022-04-15 RX ORDER — LIDOCAINE HYDROCHLORIDE 10 MG/ML
5 INJECTION, SOLUTION EPIDURAL; INFILTRATION; INTRACAUDAL; PERINEURAL ONCE
Status: COMPLETED | OUTPATIENT
Start: 2022-04-15 | End: 2022-04-15

## 2022-04-15 RX ADMIN — LIDOCAINE HYDROCHLORIDE 5 ML: 10 INJECTION, SOLUTION EPIDURAL; INFILTRATION; INTRACAUDAL; PERINEURAL at 11:11

## 2022-04-15 RX ADMIN — METHYLPREDNISOLONE ACETATE 40 MG: 40 INJECTION, SUSPENSION INTRA-ARTICULAR; INTRALESIONAL; INTRAMUSCULAR; INTRASYNOVIAL; SOFT TISSUE at 11:19

## 2022-04-15 RX ADMIN — BUPIVACAINE HYDROCHLORIDE 10 ML: 2.5 INJECTION, SOLUTION EPIDURAL; INFILTRATION; INTRACAUDAL; PERINEURAL at 11:19

## 2022-04-15 NOTE — DISCHARGE INSTRUCTIONS
Radiofrequency Lesioning, Care After  Refer to this sheet in the next few weeks. These instructions provide you with information about caring for yourself after your procedure. Your health care provider may also give you more specific instructions. Your treatment has been planned according to current medical practices, but problems sometimes occur. Call your health care provider if you have any problems or questions after your procedure.  What can I expect after the procedure?  After the procedure, it is common to have:  Pain from the burned nerve.  You may feel a burning sensation for up to 1-2 weeks after the procedure  Temporary numbness at the site    Follow these instructions at home:  Take over-the-counter and prescription medicines only as told by your health care provider.  Return to your normal activities as told by your health care provider. Ask your health care provider what activities are safe for you.  Pay close attention to how you feel after the procedure. If you start to have pain, write down when it hurts and how it feels. This will help you and your health care provider to know if you need an additional treatment.  Check your needle insertion site every day for signs of infection. Watch for:  Redness, swelling, or pain.  Fluid, blood, or pus.  Keep all follow-up visits as told by your health care provider. This is important.  No driving for 24 hrs-make sure you have full sensation in your legs prior to driving.  Contact a health care provider if:  Your pain does not get better.  You have redness, swelling, or pain at the needle insertion site.  You have fluid, blood, or pus coming from the needle insertion site.  You have a fever.  You have new numb.ness in your arm or leg after the procedure  Get help right away if:  You develop sudden, severe pain.  You develop numbness or tingling near the procedure site that does not go away.  This information is not intended to replace advice given to you by  your health care provider. Make sure you discuss any questions you have with your health care provider.  Document Released: 08/16/2012 Document Revised: 05/25/2017 Document Reviewed: 01/25/2016  Elsevier Interactive Patient Education © 2019 [employer]

## 2022-04-15 NOTE — PROCEDURES
"Subjective    CC back pain  Kaveh Vences is a 71 y.o. male with lumbar spondylosis here for repeat right lumbar RFA.  Off Plavix 7 days    Pain Assessment   Location of Pain: Lower Back, R Hip, L Hip,   Description of Pain: Dull/Aching, Throbbing, Stabbing  Previous Pain Rating :7  Current Pain Ratin  Aggravating Factors: Activity  Alleviating Factors: Rest, Medication    The following portions of the patient's history were reviewed and updated as appropriate: allergies, current medications, past family history, past medical history, past social history, past surgical history and problem list.      Review of Systems  As in HPI  Objective   Physical Exam   Constitutional: He is oriented to person, place, and time. No distress.   Cardiovascular: Normal rate.   Pulmonary/Chest: Effort normal.   Musculoskeletal:      Lumbar back: He exhibits decreased range of motion and tenderness.   Neurological: He is alert and oriented to person, place, and time.     /69 (Patient Position: Sitting)   Pulse 50   Temp 97.5 °F (36.4 °C) (Skin)   Resp 16   Ht 170.2 cm (67\")   Wt 84.8 kg (187 lb)   SpO2 97%   BMI 29.29 kg/m²       Assessment/Plan    underwent repeat right lumbar RFA    RTC 6 weeks    DATE OF PROCEDURE:  4/15/2022     PREOPERATIVE DIAGNOSIS:   Lumbar spondylosis without myelopathy    POSTOPERATIVE DIAGNOSIS: same    PROCEDURE PERFORMED:  right lumbar Sacral RFTC at  L4/L5, L5/S1.    The patient presents with a history of lumbosacral spondylosis on the right at level[  L4/L5 ] [ L5/ S1 ].  The patient presents today for lumbosacral RFTC.  The patient understands the risks and benefits of the procedure and wishes to proceed.  The patient was seen in the preoperative area.  Patient's consent was obtained and updated.  Vitals were taken.  Patient was then brought to the procedure suite and placed in a prone position.  The appropriate anatomic area was widely prepped with Chloraprep and draped in a sterile " fashion.  Noninvasive monitoring per routine anesthesia protocol was placed.  Under fluoroscopic guidance an AP view with caudad cephaled tilt was obtained.  A 20 guage RFTC cannula was passed through skin anesthetized with 1% Lidocaine without epinephrine.  The needle tip was guided to the superior medial aspect of the transverse process at [  L3 ][  L4 ][  L5 and  sacral ala ].  Motor stimulation was undertaken at 2.0 mAmps at 2 Hertz. At no point was motor stimulation or sensory stimulation noted in a radicular fashion.  Impedence range 200's. Prior to ablation, each level was anesthetized with 2mL of 1% Lidocaine without epinephrine. The medial branch nerves were then ablated at 80* C for 90 seconds each .  Following ablation, each level was injected with 1 mL of  expiration containing 1 mL of 40 mg Depo-Medrol and 4 mL of 0.25% bupivacaine and the RFTC cannula removed.  A sterile dressing was placed over the puncture site.    The patient tolerated the procedure with no complications. They were then brought to the post procedure area where they recovered nicely.     Discharge  The patient will be discharged home in stable condition.  Patient understands to contact the Center with any post procedure questions or concerns.  Discharge instructions given by nursing staff.

## 2022-04-19 RX ORDER — ISOSORBIDE MONONITRATE 30 MG/1
TABLET, EXTENDED RELEASE ORAL
Qty: 90 TABLET | Refills: 0 | Status: SHIPPED | OUTPATIENT
Start: 2022-04-19 | End: 2022-08-19

## 2022-04-29 ENCOUNTER — APPOINTMENT (OUTPATIENT)
Dept: PAIN MEDICINE | Facility: HOSPITAL | Age: 72
End: 2022-04-29

## 2022-05-04 RX ORDER — METOPROLOL TARTRATE 50 MG/1
TABLET, FILM COATED ORAL
Qty: 180 TABLET | Refills: 0 | Status: SHIPPED | OUTPATIENT
Start: 2022-05-04 | End: 2022-08-04 | Stop reason: HOSPADM

## 2022-05-04 RX ORDER — ATORVASTATIN CALCIUM 20 MG/1
TABLET, FILM COATED ORAL
Qty: 90 TABLET | Refills: 0 | Status: SHIPPED | OUTPATIENT
Start: 2022-05-04 | End: 2022-09-06

## 2022-05-05 RX ORDER — CLOPIDOGREL BISULFATE 75 MG/1
TABLET ORAL
Qty: 90 TABLET | Refills: 2 | Status: SHIPPED | OUTPATIENT
Start: 2022-05-05

## 2022-05-13 ENCOUNTER — HOSPITAL ENCOUNTER (OUTPATIENT)
Dept: PAIN MEDICINE | Facility: HOSPITAL | Age: 72
Discharge: HOME | End: 2022-05-13

## 2022-05-13 VITALS
DIASTOLIC BLOOD PRESSURE: 57 MMHG | HEIGHT: 67 IN | OXYGEN SATURATION: 97 % | SYSTOLIC BLOOD PRESSURE: 117 MMHG | TEMPERATURE: 97.7 F | HEART RATE: 52 BPM | WEIGHT: 187 LBS | RESPIRATION RATE: 16 BRPM | BODY MASS INDEX: 29.35 KG/M2

## 2022-05-13 DIAGNOSIS — M47.817 LUMBOSACRAL SPONDYLOSIS WITHOUT MYELOPATHY: Primary | ICD-10-CM

## 2022-05-13 DIAGNOSIS — R52 PAIN: ICD-10-CM

## 2022-05-13 PROCEDURE — 77003 FLUOROGUIDE FOR SPINE INJECT: CPT

## 2022-05-13 PROCEDURE — 64635 DESTROY LUMB/SAC FACET JNT: CPT | Performed by: ANESTHESIOLOGY

## 2022-05-13 PROCEDURE — 25010000002 METHYLPREDNISOLONE PER 40 MG: Performed by: ANESTHESIOLOGY

## 2022-05-13 PROCEDURE — 64636 DESTROY L/S FACET JNT ADDL: CPT | Performed by: ANESTHESIOLOGY

## 2022-05-13 RX ORDER — METHYLPREDNISOLONE ACETATE 40 MG/ML
40 INJECTION, SUSPENSION INTRA-ARTICULAR; INTRALESIONAL; INTRAMUSCULAR; SOFT TISSUE ONCE
Status: COMPLETED | OUTPATIENT
Start: 2022-05-13 | End: 2022-05-13

## 2022-05-13 RX ORDER — LIDOCAINE HYDROCHLORIDE 10 MG/ML
5 INJECTION, SOLUTION EPIDURAL; INFILTRATION; INTRACAUDAL; PERINEURAL ONCE
Status: COMPLETED | OUTPATIENT
Start: 2022-05-13 | End: 2022-05-13

## 2022-05-13 RX ORDER — BUPIVACAINE HYDROCHLORIDE 2.5 MG/ML
10 INJECTION, SOLUTION EPIDURAL; INFILTRATION; INTRACAUDAL ONCE
Status: COMPLETED | OUTPATIENT
Start: 2022-05-13 | End: 2022-05-13

## 2022-05-13 RX ADMIN — METHYLPREDNISOLONE ACETATE 40 MG: 40 INJECTION, SUSPENSION INTRA-ARTICULAR; INTRALESIONAL; INTRAMUSCULAR; INTRASYNOVIAL; SOFT TISSUE at 10:01

## 2022-05-13 RX ADMIN — LIDOCAINE HYDROCHLORIDE 5 ML: 10 INJECTION, SOLUTION EPIDURAL; INFILTRATION; INTRACAUDAL; PERINEURAL at 09:55

## 2022-05-13 RX ADMIN — BUPIVACAINE HYDROCHLORIDE 10 ML: 2.5 INJECTION, SOLUTION EPIDURAL; INFILTRATION; INTRACAUDAL; PERINEURAL at 10:01

## 2022-05-13 NOTE — DISCHARGE INSTRUCTIONS
Radiofrequency Lesioning, Care After    Refer to this sheet in the next few weeks. These instructions provide you with information about caring for yourself after your procedure. Your health care provider may also give you more specific instructions. Your treatment has been planned according to current medical practices, but problems sometimes occur. Call your health care provider if you have any problems or questions after your procedure.  What can I expect after the procedure?  After the procedure, it is common to have:  Pain from the burned nerve.  You may feel a burning sensation for up to 1-2 weeks after the procedure  Temporary numbness at the site  Your leg/legs may be weak or feel numb after the procedure until the numbing medication wears off.  When you are up and walking, have assistance to prevent falling.     Follow these instructions at home:  Take over-the-counter and prescription medicines only as told by your health care provider.  Return to your normal activities as told by your health care provider. Ask your health care provider what activities are safe for you.  Pay close attention to how you feel after the procedure. If you start to have pain, write down when it hurts and how it feels. This will help you and your health care provider to know if you need an additional treatment.  Check your needle insertion site every day for signs of infection. Watch for:  Redness, swelling, or pain.  Fluid, blood, or pus.  Keep all follow-up visits as told by your health care provider. This is important.  No driving for 24 hrs-make sure you have full sensation in your legs prior to driving.  Avoid using heat on the injection site for 24 hours. You may use ice intermittently if needed by placing a               towel between your skin and the ice bag and using the ice for 20 minutes 2-3 times a day.  Do not take baths, swim or use a hot tub for 24 hours.  Leave your band-aids on for 24 hours and keep them  dry.    Contact a health care provider if:  Your pain does not get better.  You have redness, swelling, or pain at the needle insertion site.  You have fluid, blood, or pus coming from the needle insertion site.  You have a fever over 101 degrees.  You have new numb.ness in your arm or leg after the procedure    Get help right away if:  You develop sudden, severe pain.  You develop numbness or tingling near the procedure site that does not go away.  This information is not intended to replace advice given to you by your health care provider. Make sure you discuss any questions you have with your health care provider.  Document Released: 08/16/2012 Document Revised: 05/25/2017 Document Reviewed: 01/25/2016  [employer] Interactive Patient Education © 2019 [employer]

## 2022-05-13 NOTE — PROCEDURES
"Subjective    CC back pain  Kaveh Vences is a 71 y.o. male with lumbar spondylosis here for repeat left lumbar RFA.  Off Plavix 7 days    Pain Assessment   Location of Pain: Lower Back, R Hip, L Hip,   Description of Pain: Dull/Aching, Throbbing, Stabbing  Previous Pain Rating :7  Current Pain Ratin  Aggravating Factors: Activity  Alleviating Factors: Rest, Medication    The following portions of the patient's history were reviewed and updated as appropriate: allergies, current medications, past family history, past medical history, past social history, past surgical history and problem list.      Review of Systems  As in HPI  Objective   Physical Exam   Constitutional: He is oriented to person, place, and time. No distress.   Cardiovascular: Normal rate.   Pulmonary/Chest: Effort normal.   Musculoskeletal:      Lumbar back: He exhibits decreased range of motion and tenderness.   Neurological: He is alert and oriented to person, place, and time.     /57 (BP Location: Left arm, Patient Position: Sitting)   Pulse 52   Temp 97.7 °F (36.5 °C) (Skin)   Resp 16   Ht 170.2 cm (67\")   Wt 84.8 kg (187 lb)   SpO2 97%   BMI 29.29 kg/m²       Assessment & Plan    underwent repeat left lumbar RFA    RTC 6 weeks    DATE OF PROCEDURE:  2022     PREOPERATIVE DIAGNOSIS:   Lumbar spondylosis without myelopathy    POSTOPERATIVE DIAGNOSIS: same    PROCEDURE PERFORMED:  Left lumbar Sacral RFTC at  L4/L5, L5/S1.    The patient presents with a history of lumbosacral spondylosis on the left at level[  L4/L5 ] [ L5/ S1 ].  The patient presents today for lumbosacral RFTC.  The patient understands the risks and benefits of the procedure and wishes to proceed.  The patient was seen in the preoperative area.  Patient's consent was obtained and updated.  Vitals were taken.  Patient was then brought to the procedure suite and placed in a prone position.  The appropriate anatomic area was widely prepped with Chloraprep and " draped in a sterile fashion.  Noninvasive monitoring per routine anesthesia protocol was placed.  Under fluoroscopic guidance an AP view with caudad cephaled tilt was obtained.  A 20 guage RFTC cannula was passed through skin anesthetized with 1% Lidocaine without epinephrine.  The needle tip was guided to the superior medial aspect of the transverse process at [  L3 ][  L4 ][  L5 and  sacral ala ].  Motor stimulation was undertaken at 2.0 mAmps at 2 Hertz. At no point was motor stimulation or sensory stimulation noted in a radicular fashion.  Impedence range 200's. Prior to ablation, each level was anesthetized with 2mL of 1% Lidocaine without epinephrine. The medial branch nerves were then ablated at 80* C for 90 seconds each .  Following ablation, each level was injected with 1 mL of  expiration containing 1 mL of 40 mg Depo-Medrol and 4 mL of 0.25% bupivacaine and the RFTC cannula removed.  A sterile dressing was placed over the puncture site.    The patient tolerated the procedure with no complications. They were then brought to the post procedure area where they recovered nicely.     Discharge  The patient will be discharged home in stable condition.  Patient understands to contact the Center with any post procedure questions or concerns.  Discharge instructions given by nursing staff.

## 2022-06-07 RX ORDER — LISINOPRIL 20 MG/1
TABLET ORAL
Qty: 180 TABLET | Refills: 0 | Status: SHIPPED | OUTPATIENT
Start: 2022-06-07 | End: 2022-09-06

## 2022-06-14 ENCOUNTER — OFFICE VISIT (OUTPATIENT)
Dept: PAIN MEDICINE | Facility: CLINIC | Age: 72
End: 2022-06-14

## 2022-06-14 VITALS
WEIGHT: 187 LBS | HEART RATE: 53 BPM | SYSTOLIC BLOOD PRESSURE: 112 MMHG | OXYGEN SATURATION: 96 % | RESPIRATION RATE: 16 BRPM | HEIGHT: 67 IN | DIASTOLIC BLOOD PRESSURE: 61 MMHG | BODY MASS INDEX: 29.35 KG/M2

## 2022-06-14 DIAGNOSIS — M47.817 LUMBOSACRAL SPONDYLOSIS WITHOUT MYELOPATHY: ICD-10-CM

## 2022-06-14 DIAGNOSIS — M47.812 CERVICAL SPONDYLOSIS WITHOUT MYELOPATHY: ICD-10-CM

## 2022-06-14 DIAGNOSIS — G89.4 CHRONIC PAIN SYNDROME: Primary | ICD-10-CM

## 2022-06-14 DIAGNOSIS — M96.1 POSTLAMINECTOMY SYNDROME OF LUMBAR REGION: ICD-10-CM

## 2022-06-14 PROCEDURE — 99214 OFFICE O/P EST MOD 30 MIN: CPT | Performed by: ANESTHESIOLOGY

## 2022-06-14 RX ORDER — TIZANIDINE 4 MG/1
4 TABLET ORAL EVERY 8 HOURS PRN
Qty: 90 TABLET | Refills: 5 | Status: SHIPPED | OUTPATIENT
Start: 2022-06-14 | End: 2022-12-13 | Stop reason: SDUPTHER

## 2022-06-14 NOTE — PROGRESS NOTES
Subjective    CC Back pain  Kaveh Vences is a 71 y.o. male with chronic back pain history of decompression/L2-3 microdiscectomy in 2019/Dr. Brandon, here for f/u.  60% relief with repeat lumbar RFA and significant functional benefits.  Denies new complaints today.  Chronic axial back pain radiating to bilateral hip without radicular pain worse with standing walking or prolonged activity.  Denies weakness, saddle anesthesia bladder bowel continence.  Chronic neck pain radiating to head and bilateral shoulders worse at night and associated with muscle spasm headaches.  Denies weakness, balance issues, bladder bowel continence.    Tried physical therapy and medication with marginal relief.  Interfering with ADL and sleep.   Seen neurosurgery, no surgery recommended.    C-spine MRI  Multilevel degenerative disc disease most notably at C5-C6 and C6-C7 with mild spinal canal stenosis. Moderate right-sided neuroforaminal narrowing at C6-C7 with mild left-sided neuroforaminal narrowing.  L-spine x-ray  Good range of motion with flexion and extension.  No evidence of subluxation. Persistent degenerative disc space narrowing at the L3/4 and L5/K1jjbqct, unchanged from prior study.   L-spine MRI  degenerative disc disease and mild facet degenerative change. Findings are most pronounced at L2-L3 were is moderate to severe neural foramen narrowing on the left due to the large focal disc extrusion.  mild neural foramen narrowing on the right at the L2-L3 level due to a smaller focal extrusion    Pain Assessment   Location of Pain: Lower Back  Description of Pain: Dull/Aching, Throbbing, Stabbing  Previous Pain Rating :5  Current Pain Ratin  Aggravating Factors: Activity  Alleviating Factors: Rest, Medication    PEG Assessment   What number best describes your pain on average in the past week?1  What number best describes how, during the past week, pain has interfered with your enjoyment of life?1  What  "number best describes how, during the past week, pain has interfered with your general activity?6     The following portions of the patient's history were reviewed and updated as appropriate: allergies, current medications, past family history, past medical history, past social history, past surgical history and problem list.     has a past medical history of Atrial fibrillation (HCC), Coronary artery disease, Diabetes mellitus (HCC), Gallstones, Hyperlipidemia, Hypertension, Injury of back, Low back pain, Mitral regurgitation, Neck pain, Rheumatoid arthritis (HCC), Sleep apnea, and Valvular disease.   has a past surgical history that includes Coronary artery bypass graft (05/11/2012); Coronary angioplasty (2006, 2009); Rotator cuff repair (Left); Cholecystectomy (12/22/2015); Cardiac catheterization (2006, 2009, 2012, 2018); and Back surgery (01/2019).  family history includes Coronary artery disease in his brother and father; Diabetes in his maternal uncle; Heart attack in his brother; Stroke in his brother.  Social History     Tobacco Use   • Smoking status: Never Smoker   • Smokeless tobacco: Never Used   Substance Use Topics   • Alcohol use: No       Review of Systems   Musculoskeletal: Positive for arthralgias, back pain, myalgias, neck pain and neck stiffness.   All other systems reviewed and are negative.      Objective   Physical Exam   Constitutional: No distress.   Pulmonary/Chest: Effort normal.   Musculoskeletal:      Cervical back: He exhibits decreased range of motion and tenderness.      Lumbar back: He exhibits tenderness.   Vitals reviewed.    /61   Pulse 53   Resp 16   Ht 170.2 cm (67\")   Wt 84.8 kg (187 lb)   SpO2 96%   BMI 29.29 kg/m²      PHQ 9 on chart  Opioid risk tool low risk    Assessment & Plan   Diagnoses and all orders for this visit:    1. Chronic pain syndrome (Primary)  -     tiZANidine (ZANAFLEX) 4 MG tablet; Take 1 tablet by mouth Every 8 (Eight) Hours As Needed for " Muscle Spasms.  Dispense: 90 tablet; Refill: 5    2. Cervical spondylosis without myelopathy    3. Postlaminectomy syndrome of lumbar region    4. Lumbosacral spondylosis without myelopathy    Summary  Kaveh Vences is a 71 y.o. male with chronic back pain history of decompression/L2-3 microdiscectomy in January 2019/Dr. Brandon, chronic neck pain here for f/u.   Chronic neck pain from DDD spondylosis.  Chronic back pain from postlaminectomy syndrome and chronic polyarthralgia.  Repeat lumbar RFA or cervical CLARENCE as needed.    60% relief with repeat lumbar RFA and significant functional benefits.  Denies new complaints today.    Continue  Zanaflex at as needed bedtime.    RTC 6 months or as needed for procedure

## 2022-06-24 ENCOUNTER — OFFICE (INPATIENT)
Dept: URBAN - METROPOLITAN AREA PATHOLOGY 4 | Facility: PATHOLOGY | Age: 72
End: 2022-06-24
Payer: COMMERCIAL

## 2022-06-24 ENCOUNTER — ON CAMPUS - OUTPATIENT (INPATIENT)
Dept: URBAN - METROPOLITAN AREA HOSPITAL 2 | Facility: HOSPITAL | Age: 72
End: 2022-06-24

## 2022-06-24 ENCOUNTER — OFFICE (INPATIENT)
Dept: URBAN - METROPOLITAN AREA PATHOLOGY 4 | Facility: PATHOLOGY | Age: 72
End: 2022-06-24

## 2022-06-24 VITALS
DIASTOLIC BLOOD PRESSURE: 63 MMHG | SYSTOLIC BLOOD PRESSURE: 90 MMHG | SYSTOLIC BLOOD PRESSURE: 114 MMHG | SYSTOLIC BLOOD PRESSURE: 104 MMHG | OXYGEN SATURATION: 98 % | DIASTOLIC BLOOD PRESSURE: 60 MMHG | RESPIRATION RATE: 17 BRPM | DIASTOLIC BLOOD PRESSURE: 42 MMHG | DIASTOLIC BLOOD PRESSURE: 45 MMHG | SYSTOLIC BLOOD PRESSURE: 86 MMHG | SYSTOLIC BLOOD PRESSURE: 94 MMHG | HEART RATE: 44 BPM | DIASTOLIC BLOOD PRESSURE: 64 MMHG | SYSTOLIC BLOOD PRESSURE: 127 MMHG | HEART RATE: 43 BPM | DIASTOLIC BLOOD PRESSURE: 59 MMHG | OXYGEN SATURATION: 99 % | SYSTOLIC BLOOD PRESSURE: 108 MMHG | HEART RATE: 47 BPM | OXYGEN SATURATION: 97 % | TEMPERATURE: 96.9 F | HEART RATE: 51 BPM | DIASTOLIC BLOOD PRESSURE: 54 MMHG | SYSTOLIC BLOOD PRESSURE: 89 MMHG | RESPIRATION RATE: 12 BRPM | SYSTOLIC BLOOD PRESSURE: 102 MMHG | HEART RATE: 42 BPM | DIASTOLIC BLOOD PRESSURE: 55 MMHG | HEART RATE: 45 BPM | HEIGHT: 66 IN | HEART RATE: 49 BPM | WEIGHT: 174 LBS | RESPIRATION RATE: 15 BRPM | DIASTOLIC BLOOD PRESSURE: 66 MMHG | RESPIRATION RATE: 16 BRPM | OXYGEN SATURATION: 979 %

## 2022-06-24 DIAGNOSIS — D12.2 BENIGN NEOPLASM OF ASCENDING COLON: ICD-10-CM

## 2022-06-24 DIAGNOSIS — D12.3 BENIGN NEOPLASM OF TRANSVERSE COLON: ICD-10-CM

## 2022-06-24 DIAGNOSIS — K57.30 DIVERTICULOSIS OF LARGE INTESTINE WITHOUT PERFORATION OR ABS: ICD-10-CM

## 2022-06-24 DIAGNOSIS — Z12.11 ENCOUNTER FOR SCREENING FOR MALIGNANT NEOPLASM OF COLON: ICD-10-CM

## 2022-06-24 PROBLEM — K63.5 POLYP OF COLON: Status: ACTIVE | Noted: 2022-06-24

## 2022-06-24 LAB
GI HISTOLOGY: A. UNSPECIFIED: (no result)
GI HISTOLOGY: B. UNSPECIFIED: (no result)
GI HISTOLOGY: PDF REPORT: (no result)

## 2022-06-24 PROCEDURE — 45385 COLONOSCOPY W/LESION REMOVAL: CPT | Mod: PT | Performed by: INTERNAL MEDICINE

## 2022-06-24 PROCEDURE — 88305 TISSUE EXAM BY PATHOLOGIST: CPT | Mod: 26 | Performed by: INTERNAL MEDICINE

## 2022-07-13 RX ORDER — RANOLAZINE 1000 MG/1
TABLET, EXTENDED RELEASE ORAL
Qty: 180 TABLET | Refills: 0 | Status: SHIPPED | OUTPATIENT
Start: 2022-07-13 | End: 2022-10-17

## 2022-07-28 ENCOUNTER — TELEPHONE (OUTPATIENT)
Dept: CARDIOLOGY | Facility: CLINIC | Age: 72
End: 2022-07-28

## 2022-07-28 NOTE — TELEPHONE ENCOUNTER
"  Caller: Erika Powell    Relationship: Emergency Contact    Best call back number: [phone]    What is the best time to reach you: ANY    WIFE (ERIKA) IS ADVISING THAT THE PCP OF PATIENT (ОЛЬГА POWELL) WAS WANTING TO PUT PT ON A BLOOD THINNER BECAUSE HE \"HEARD SOMETHING\" WITH HIS HEART DURING HIS EXAMINATION. WIFE WAS NOT IN SUPPORT OF THIS, AND IS REQUESTING DR. SHEPHERD OPINION AND A VISIT EARLIER THEN September TO CLARIFY THIS  "

## 2022-08-03 ENCOUNTER — HOSPITAL ENCOUNTER (OUTPATIENT)
Facility: HOSPITAL | Age: 72
Setting detail: OBSERVATION
Discharge: HOME | End: 2022-08-04
Attending: EMERGENCY MEDICINE | Admitting: EMERGENCY MEDICINE

## 2022-08-03 ENCOUNTER — APPOINTMENT (OUTPATIENT)
Dept: GENERAL RADIOLOGY | Facility: HOSPITAL | Age: 72
End: 2022-08-03

## 2022-08-03 DIAGNOSIS — R07.9 CHEST PAIN, UNSPECIFIED TYPE: Primary | ICD-10-CM

## 2022-08-03 LAB
ALBUMIN SERPL-MCNC: 4.1 G/DL (ref 3.5–5.2)
ALBUMIN/GLOB SERPL: 2.3 G/DL
ALP SERPL-CCNC: 46 U/L (ref 39–117)
ALT SERPL W P-5'-P-CCNC: 9 U/L (ref 1–41)
ANION GAP SERPL CALCULATED.3IONS-SCNC: 9 MMOL/L (ref 5–15)
AST SERPL-CCNC: 11 U/L (ref 1–40)
BASOPHILS # BLD AUTO: 0 10*3/MM3 (ref 0–0.2)
BASOPHILS NFR BLD AUTO: 0.7 % (ref 0–1.5)
BILIRUB SERPL-MCNC: 0.6 MG/DL (ref 0–1.2)
BILIRUB UR QL STRIP: NEGATIVE
BUN SERPL-MCNC: 16 MG/DL (ref 8–23)
BUN/CREAT SERPL: 19.3 (ref 7–25)
CALCIUM SPEC-SCNC: 8.7 MG/DL (ref 8.6–10.5)
CHLORIDE SERPL-SCNC: 102 MMOL/L (ref 98–107)
CLARITY UR: CLEAR
CO2 SERPL-SCNC: 25 MMOL/L (ref 22–29)
COLOR UR: YELLOW
CREAT SERPL-MCNC: 0.83 MG/DL (ref 0.76–1.27)
DEPRECATED RDW RBC AUTO: 45.5 FL (ref 37–54)
EGFRCR SERPLBLD CKD-EPI 2021: 93 ML/MIN/1.73
EOSINOPHIL # BLD AUTO: 0 10*3/MM3 (ref 0–0.4)
EOSINOPHIL NFR BLD AUTO: 0.5 % (ref 0.3–6.2)
ERYTHROCYTE [DISTWIDTH] IN BLOOD BY AUTOMATED COUNT: 13.4 % (ref 12.3–15.4)
GLOBULIN UR ELPH-MCNC: 1.8 GM/DL
GLUCOSE BLDC GLUCOMTR-MCNC: 100 MG/DL (ref 70–105)
GLUCOSE BLDC GLUCOMTR-MCNC: 170 MG/DL (ref 70–105)
GLUCOSE SERPL-MCNC: 196 MG/DL (ref 65–99)
GLUCOSE UR STRIP-MCNC: NEGATIVE MG/DL
HCT VFR BLD AUTO: 36.7 % (ref 37.5–51)
HGB BLD-MCNC: 12.6 G/DL (ref 13–17.7)
HGB UR QL STRIP.AUTO: NEGATIVE
KETONES UR QL STRIP: NEGATIVE
LEUKOCYTE ESTERASE UR QL STRIP.AUTO: NEGATIVE
LYMPHOCYTES # BLD AUTO: 1.1 10*3/MM3 (ref 0.7–3.1)
LYMPHOCYTES NFR BLD AUTO: 22.3 % (ref 19.6–45.3)
MAGNESIUM SERPL-MCNC: 1.7 MG/DL (ref 1.6–2.4)
MCH RBC QN AUTO: 33.2 PG (ref 26.6–33)
MCHC RBC AUTO-ENTMCNC: 34.3 G/DL (ref 31.5–35.7)
MCV RBC AUTO: 96.9 FL (ref 79–97)
MONOCYTES # BLD AUTO: 0.4 10*3/MM3 (ref 0.1–0.9)
MONOCYTES NFR BLD AUTO: 9 % (ref 5–12)
NEUTROPHILS NFR BLD AUTO: 3.3 10*3/MM3 (ref 1.7–7)
NEUTROPHILS NFR BLD AUTO: 67.5 % (ref 42.7–76)
NITRITE UR QL STRIP: NEGATIVE
NRBC BLD AUTO-RTO: 0 /100 WBC (ref 0–0.2)
PH UR STRIP.AUTO: 7.5 [PH] (ref 5–8)
PLATELET # BLD AUTO: 171 10*3/MM3 (ref 140–450)
PMV BLD AUTO: 7 FL (ref 6–12)
POTASSIUM SERPL-SCNC: 4.4 MMOL/L (ref 3.5–5.2)
PROT SERPL-MCNC: 5.9 G/DL (ref 6–8.5)
PROT UR QL STRIP: NEGATIVE
RBC # BLD AUTO: 3.78 10*6/MM3 (ref 4.14–5.8)
SARS-COV-2 RNA PNL SPEC NAA+PROBE: NOT DETECTED
SODIUM SERPL-SCNC: 136 MMOL/L (ref 136–145)
SP GR UR STRIP: 1.01 (ref 1–1.03)
TROPONIN T SERPL-MCNC: <0.01 NG/ML (ref 0–0.03)
TROPONIN T SERPL-MCNC: <0.01 NG/ML (ref 0–0.03)
UROBILINOGEN UR QL STRIP: NORMAL
WBC NRBC COR # BLD: 4.9 10*3/MM3 (ref 3.4–10.8)

## 2022-08-03 PROCEDURE — G0378 HOSPITAL OBSERVATION PER HR: HCPCS

## 2022-08-03 PROCEDURE — 82962 GLUCOSE BLOOD TEST: CPT

## 2022-08-03 PROCEDURE — 93005 ELECTROCARDIOGRAM TRACING: CPT | Performed by: EMERGENCY MEDICINE

## 2022-08-03 PROCEDURE — 96372 THER/PROPH/DIAG INJ SC/IM: CPT

## 2022-08-03 PROCEDURE — 80053 COMPREHEN METABOLIC PANEL: CPT | Performed by: EMERGENCY MEDICINE

## 2022-08-03 PROCEDURE — 87635 SARS-COV-2 COVID-19 AMP PRB: CPT | Performed by: EMERGENCY MEDICINE

## 2022-08-03 PROCEDURE — 85025 COMPLETE CBC W/AUTO DIFF WBC: CPT | Performed by: EMERGENCY MEDICINE

## 2022-08-03 PROCEDURE — 84484 ASSAY OF TROPONIN QUANT: CPT | Performed by: EMERGENCY MEDICINE

## 2022-08-03 PROCEDURE — 83735 ASSAY OF MAGNESIUM: CPT | Performed by: EMERGENCY MEDICINE

## 2022-08-03 PROCEDURE — 81003 URINALYSIS AUTO W/O SCOPE: CPT | Performed by: EMERGENCY MEDICINE

## 2022-08-03 PROCEDURE — 99284 EMERGENCY DEPT VISIT MOD MDM: CPT

## 2022-08-03 PROCEDURE — C9803 HOPD COVID-19 SPEC COLLECT: HCPCS

## 2022-08-03 PROCEDURE — 36415 COLL VENOUS BLD VENIPUNCTURE: CPT | Performed by: PHYSICIAN ASSISTANT

## 2022-08-03 PROCEDURE — 84484 ASSAY OF TROPONIN QUANT: CPT | Performed by: PHYSICIAN ASSISTANT

## 2022-08-03 PROCEDURE — 71045 X-RAY EXAM CHEST 1 VIEW: CPT

## 2022-08-03 PROCEDURE — 93005 ELECTROCARDIOGRAM TRACING: CPT

## 2022-08-03 PROCEDURE — 25010000002 ENOXAPARIN PER 10 MG: Performed by: PHYSICIAN ASSISTANT

## 2022-08-03 RX ORDER — LISINOPRIL 20 MG/1
20 TABLET ORAL ONCE
Status: COMPLETED | OUTPATIENT
Start: 2022-08-03 | End: 2022-08-03

## 2022-08-03 RX ORDER — ATORVASTATIN CALCIUM 20 MG/1
20 TABLET, FILM COATED ORAL ONCE
Status: COMPLETED | OUTPATIENT
Start: 2022-08-03 | End: 2022-08-03

## 2022-08-03 RX ORDER — SODIUM CHLORIDE 0.9 % (FLUSH) 0.9 %
10 SYRINGE (ML) INJECTION AS NEEDED
Status: DISCONTINUED | OUTPATIENT
Start: 2022-08-03 | End: 2022-08-04 | Stop reason: HOSPADM

## 2022-08-03 RX ORDER — NICOTINE POLACRILEX 4 MG
15 LOZENGE BUCCAL
Status: DISCONTINUED | OUTPATIENT
Start: 2022-08-03 | End: 2022-08-04 | Stop reason: HOSPADM

## 2022-08-03 RX ORDER — AMLODIPINE BESYLATE 5 MG/1
5 TABLET ORAL ONCE
Status: COMPLETED | OUTPATIENT
Start: 2022-08-03 | End: 2022-08-03

## 2022-08-03 RX ORDER — ONDANSETRON 2 MG/ML
4 INJECTION INTRAMUSCULAR; INTRAVENOUS EVERY 6 HOURS PRN
Status: DISCONTINUED | OUTPATIENT
Start: 2022-08-03 | End: 2022-08-04 | Stop reason: HOSPADM

## 2022-08-03 RX ORDER — ASPIRIN 325 MG
325 TABLET ORAL ONCE
Status: COMPLETED | OUTPATIENT
Start: 2022-08-03 | End: 2022-08-03

## 2022-08-03 RX ORDER — CHOLECALCIFEROL (VITAMIN D3) 125 MCG
5 CAPSULE ORAL NIGHTLY PRN
Status: DISCONTINUED | OUTPATIENT
Start: 2022-08-03 | End: 2022-08-04 | Stop reason: HOSPADM

## 2022-08-03 RX ORDER — ONDANSETRON 4 MG/1
4 TABLET, FILM COATED ORAL EVERY 6 HOURS PRN
Status: DISCONTINUED | OUTPATIENT
Start: 2022-08-03 | End: 2022-08-04 | Stop reason: HOSPADM

## 2022-08-03 RX ORDER — INSULIN LISPRO 100 [IU]/ML
0-9 INJECTION, SOLUTION INTRAVENOUS; SUBCUTANEOUS
Status: DISCONTINUED | OUTPATIENT
Start: 2022-08-03 | End: 2022-08-04 | Stop reason: HOSPADM

## 2022-08-03 RX ORDER — DEXTROSE MONOHYDRATE 25 G/50ML
25 INJECTION, SOLUTION INTRAVENOUS
Status: DISCONTINUED | OUTPATIENT
Start: 2022-08-03 | End: 2022-08-04 | Stop reason: HOSPADM

## 2022-08-03 RX ORDER — RANOLAZINE 500 MG/1
1000 TABLET, EXTENDED RELEASE ORAL ONCE
Status: COMPLETED | OUTPATIENT
Start: 2022-08-03 | End: 2022-08-03

## 2022-08-03 RX ORDER — NITROGLYCERIN 0.4 MG/1
0.4 TABLET SUBLINGUAL
Status: DISCONTINUED | OUTPATIENT
Start: 2022-08-03 | End: 2022-08-04 | Stop reason: HOSPADM

## 2022-08-03 RX ORDER — OLANZAPINE 10 MG/2ML
1 INJECTION, POWDER, LYOPHILIZED, FOR SOLUTION INTRAMUSCULAR
Status: DISCONTINUED | OUTPATIENT
Start: 2022-08-03 | End: 2022-08-04 | Stop reason: HOSPADM

## 2022-08-03 RX ORDER — ENOXAPARIN SODIUM 100 MG/ML
40 INJECTION SUBCUTANEOUS DAILY
Status: DISCONTINUED | OUTPATIENT
Start: 2022-08-03 | End: 2022-08-04 | Stop reason: HOSPADM

## 2022-08-03 RX ORDER — SODIUM CHLORIDE 0.9 % (FLUSH) 0.9 %
10 SYRINGE (ML) INJECTION EVERY 12 HOURS SCHEDULED
Status: DISCONTINUED | OUTPATIENT
Start: 2022-08-03 | End: 2022-08-04 | Stop reason: HOSPADM

## 2022-08-03 RX ADMIN — ENOXAPARIN SODIUM 40 MG: 100 INJECTION SUBCUTANEOUS at 17:32

## 2022-08-03 RX ADMIN — LISINOPRIL 20 MG: 20 TABLET ORAL at 21:26

## 2022-08-03 RX ADMIN — RANOLAZINE 1000 MG: 500 TABLET, FILM COATED, EXTENDED RELEASE ORAL at 21:26

## 2022-08-03 RX ADMIN — Medication 10 ML: at 21:27

## 2022-08-03 RX ADMIN — ASPIRIN 325 MG ORAL TABLET 325 MG: 325 PILL ORAL at 14:38

## 2022-08-03 RX ADMIN — ATORVASTATIN CALCIUM 20 MG: 20 TABLET, FILM COATED ORAL at 21:26

## 2022-08-03 RX ADMIN — AMLODIPINE BESYLATE 5 MG: 5 TABLET ORAL at 21:26

## 2022-08-03 NOTE — CASE MANAGEMENT/SOCIAL WORK
Discharge Planning Assessment  [facility]     Patient Name: Kaveh Vences  MRN: [id number]  Today's Date: 8/3/2022    Admit Date: 8/3/2022     Discharge Needs Assessment     Row Name 08/03/22 1607       Living Environment    People in Home spouse    Current Living Arrangements home    Primary Care Provided by self    Provides Primary Care For no one       Resource/Environmental Concerns    Resource/Environmental Concerns none    Transportation Concerns none       Transition Planning    Patient/Family Anticipates Transition to home with family    Patient/Family Anticipated Services at Transition none    Transportation Anticipated family or friend will provide       Discharge Needs Assessment    Readmission Within the Last 30 Days no previous admission in last 30 days    Equipment Currently Used at Home none    Concerns to be Addressed denies needs/concerns at this time;no discharge needs identified    Anticipated Changes Related to Illness none    Equipment Needed After Discharge none               Discharge Plan     Row Name 08/03/22 1608       Plan    Plan Anticipate Routine Home    Patient/Family in Agreement with Plan yes    Plan Comments Met with Patient at wife at bedside. Lives at home with Wife. IADL's PCP and Pharmacy verified, able to afford medications. Denies any DME or HH needs. d/c barriers: Cardiology consult              Continued Care and Services - Admitted Since 8/3/2022    Coordination has not been started for this encounter.       Expected Discharge Date and Time     Expected Discharge Date Expected Discharge Time    Aug 4, 2022          Demographic Summary     Row Name 08/03/22 1607       General Information    Admission Type observation    Arrived From emergency department    Referral Source admission list    Reason for Consult discharge planning    Preferred Language English               Functional Status     Row Name 08/03/22 1607       Functional Status    Usual Activity Tolerance good     Current Activity Tolerance good       Functional Status, IADL    Medications independent    Meal Preparation independent    Housekeeping independent    Laundry independent    Shopping independent       Mental Status    General Appearance WDL WDL       Mental Status Summary    Recent Changes in Mental Status/Cognitive Functioning no changes                      Patient Forms     Row Name 08/03/22 1609       Patient Forms    Important Message from Medicare ([facility]) --  Smith 8/3 per reg              Met with patient at bedside wearing mask and goggles, Spent less than 15 minutes in room at greater than 6 feet distance.       Vonda Melo, RN

## 2022-08-03 NOTE — ED PROVIDER NOTES
Subjective   Chief complaint lightheadedness nausea chest tightness pain left arm    History of present illness 31-year-old male history of heart disease with previous bypass surgery atrial fibrillation 1 day history of intermittent feeling lightheaded and nauseous since this morning and pain shooting in his left arm and some tightness in his chest mild to moderate nothing makes it better nothing makes it worse intermittent since this morning patient has been at home.  Denies any fever chills sweats cough congestion no leg pain or swelling no recent long car ride plane or immobilization foreign travels.          Review of Systems   Constitutional: Negative for chills and fever.   HENT: Negative for congestion and sore throat.    Respiratory: Positive for chest tightness. Negative for shortness of breath.    Cardiovascular: Positive for chest pain. Negative for palpitations.   Gastrointestinal: Negative for abdominal pain and vomiting.   Genitourinary: Negative for difficulty urinating and dysuria.   Musculoskeletal: Negative for back pain and neck pain.   Skin: Negative for color change and rash.   Neurological: Positive for dizziness and light-headedness. Negative for facial asymmetry and speech difficulty.   Psychiatric/Behavioral: Negative for agitation and behavioral problems.       Past Medical History:   Diagnosis Date   • Atrial fibrillation (HCC)    • Coronary artery disease    • Diabetes mellitus (HCC)    • Gallstones    • Hyperlipidemia    • Hypertension    • Injury of back    • Low back pain    • Mitral regurgitation    • Neck pain    • Rheumatoid arthritis (HCC)    • Sleep apnea    • Valvular disease        No Known Allergies    Past Surgical History:   Procedure Laterality Date   • BACK SURGERY  01/2019   • CARDIAC CATHETERIZATION  2006, 2009, 2012, 2018   • CHOLECYSTECTOMY  12/22/2015   • CORONARY ANGIOPLASTY  2006, 2009   • CORONARY ARTERY BYPASS GRAFT  05/11/2012    x2   • ROTATOR CUFF REPAIR Left         Family History   Problem Relation Age of Onset   • Coronary artery disease Father    • Coronary artery disease Brother    • Heart attack Brother    • Stroke Brother    • Diabetes Maternal Uncle        Social History     Socioeconomic History   • Marital status:    Tobacco Use   • Smoking status: Never Smoker   • Smokeless tobacco: Never Used   Vaping Use   • Vaping Use: Never used   Substance and Sexual Activity   • Alcohol use: No   • Drug use: No   • Sexual activity: Defer     Prior to Admission medications    Medication Sig Start Date End Date Taking? Authorizing Provider   amLODIPine (NORVASC) 5 MG tablet Take 1 tablet by mouth once daily 4/5/22   Cris Mcneill MD   aspirin 81 MG EC tablet Take 81 mg by mouth Daily.    ProviderMaureen MD   atorvastatin (LIPITOR) 20 MG tablet Take 1 tablet by mouth once daily 5/4/22   Cris Mcneill MD   clopidogrel (PLAVIX) 75 MG tablet Take 1 tablet by mouth once daily 5/5/22   Cris Mcneill MD   isosorbide mononitrate (IMDUR) 30 MG 24 hr tablet Take 1 tablet by mouth once daily 4/19/22   Cris Mcneill MD   lisinopril (PRINIVIL,ZESTRIL) 20 MG tablet Take 1 tablet by mouth twice daily 6/7/22   Cris Mcneill MD   metFORMIN ER (GLUCOPHAGE-XR) 500 MG 24 hr tablet TAKE 2 TABLETS BY MOUTH TWICE DAILY FOR 30 DAYS 8/13/20   Provider, MD Maureen   metoprolol tartrate (LOPRESSOR) 50 MG tablet Take 1 tablet by mouth twice daily 5/4/22   Cris Mcneill MD   nitroglycerin (NITROSTAT) 0.4 MG SL tablet Place 1 tablet under the tongue Every 5 (Five) Minutes As Needed for Chest Pain. Take no more than 3 doses in 15 minutes. 3/24/21   Cris Mcneill MD   ranolazine (RANEXA) 1000 MG 12 hr tablet TAKE 1  BY MOUTH EVERY 12 HOURS 7/13/22   Cris Mcneill MD   tiZANidine (ZANAFLEX) 4 MG tablet Take 1 tablet by mouth Every 8 (Eight) Hours As Needed for Muscle Spasms. 6/14/22   Chloé Beltran MD            Objective   Physical Exam  Constitutional 71-year-old male awake alert in no distress.  Temperature 97.7 blood pressure 131/73 heart rate 60 at the time of triage but in the room is 45.  Patient sats are 97% on room air respiration of 18  HEENT extraocular muscles are intact pupils equal react there is no photophobia or nystagmus.  Neck supple no adenopathy no JV no bruits.  Lungs clear no retractions.  Heart regular without murmur.  Abdomen soft without tenderness good bowel sounds no pulsatile masses.  Extremities pulses are equal throughout upper and lower extremities no edema cords or Homans' sign or evidence of DVT.  Neurologically he is awake alert orientated x3 no facial asymmetry normal speech no drift the arms legs without focal weakness.  Skin warm dry without rashes.  Procedures           ED Course      Results for orders placed or performed during the hospital encounter of 08/03/22   COVID-19,CEPHEID/ENRIQUE,COR/MARTHA/PAD/ROSI IN-HOUSE(OR EMERGENT/ADD-ON),NP SWAB IN TRANSPORT MEDIA 3-4 HR TAT, RT-PCR - Swab, Nasopharynx    Specimen: Nasopharynx; Swab   Result Value Ref Range    COVID19 Not Detected Not Detected - Ref. Range   Comprehensive Metabolic Panel    Specimen: Blood   Result Value Ref Range    Glucose 196 (H) 65 - 99 mg/dL    BUN 16 8 - 23 mg/dL    Creatinine 0.83 0.76 - 1.27 mg/dL    Sodium 136 136 - 145 mmol/L    Potassium 4.4 3.5 - 5.2 mmol/L    Chloride 102 98 - 107 mmol/L    CO2 25.0 22.0 - 29.0 mmol/L    Calcium 8.7 8.6 - 10.5 mg/dL    Total Protein 5.9 (L) 6.0 - 8.5 g/dL    Albumin 4.10 3.50 - 5.20 g/dL    ALT (SGPT) 9 1 - 41 U/L    AST (SGOT) 11 1 - 40 U/L    Alkaline Phosphatase 46 39 - 117 U/L    Total Bilirubin 0.6 0.0 - 1.2 mg/dL    Globulin 1.8 gm/dL    A/G Ratio 2.3 g/dL    BUN/Creatinine Ratio 19.3 7.0 - 25.0    Anion Gap 9.0 5.0 - 15.0 mmol/L    eGFR 93.0 >60.0 mL/min/1.73   Troponin    Specimen: Blood   Result Value Ref Range    Troponin T <0.010 0.000 - 0.030 ng/mL    Magnesium    Specimen: Blood   Result Value Ref Range    Magnesium 1.7 1.6 - 2.4 mg/dL   CBC Auto Differential    Specimen: Blood   Result Value Ref Range    WBC 4.90 3.40 - 10.80 10*3/mm3    RBC 3.78 (L) 4.14 - 5.80 10*6/mm3    Hemoglobin 12.6 (L) 13.0 - 17.7 g/dL    Hematocrit 36.7 (L) 37.5 - 51.0 %    MCV 96.9 79.0 - 97.0 fL    MCH 33.2 (H) 26.6 - 33.0 pg    MCHC 34.3 31.5 - 35.7 g/dL    RDW 13.4 12.3 - 15.4 %    RDW-SD 45.5 37.0 - 54.0 fl    MPV 7.0 6.0 - 12.0 fL    Platelets 171 140 - 450 10*3/mm3    Neutrophil % 67.5 42.7 - 76.0 %    Lymphocyte % 22.3 19.6 - 45.3 %    Monocyte % 9.0 5.0 - 12.0 %    Eosinophil % 0.5 0.3 - 6.2 %    Basophil % 0.7 0.0 - 1.5 %    Neutrophils, Absolute 3.30 1.70 - 7.00 10*3/mm3    Lymphocytes, Absolute 1.10 0.70 - 3.10 10*3/mm3    Monocytes, Absolute 0.40 0.10 - 0.90 10*3/mm3    Eosinophils, Absolute 0.00 0.00 - 0.40 10*3/mm3    Basophils, Absolute 0.00 0.00 - 0.20 10*3/mm3    nRBC 0.0 0.0 - 0.2 /100 WBC   Urinalysis With Culture If Indicated - Urine, Clean Catch    Specimen: Urine, Clean Catch   Result Value Ref Range    Color, UA Yellow Yellow, Straw    Appearance, UA Clear Clear    pH, UA 7.5 5.0 - 8.0    Specific Gravity, UA 1.012 1.005 - 1.030    Glucose, UA Negative Negative    Ketones, UA Negative Negative    Bilirubin, UA Negative Negative    Blood, UA Negative Negative    Protein, UA Negative Negative    Leuk Esterase, UA Negative Negative    Nitrite, UA Negative Negative    Urobilinogen, UA 1.0 E.U./dL 0.2 - 1.0 E.U./dL   Troponin    Specimen: Blood   Result Value Ref Range    Troponin T <0.010 0.000 - 0.030 ng/mL   Basic Metabolic Panel    Specimen: Blood   Result Value Ref Range    Glucose 134 (H) 65 - 99 mg/dL    BUN 17 8 - 23 mg/dL    Creatinine 1.00 0.76 - 1.27 mg/dL    Sodium 142 136 - 145 mmol/L    Potassium 4.1 3.5 - 5.2 mmol/L    Chloride 107 98 - 107 mmol/L    CO2 26.0 22.0 - 29.0 mmol/L    Calcium 8.9 8.6 - 10.5 mg/dL    BUN/Creatinine Ratio 17.0 7.0 - 25.0     Anion Gap 9.0 5.0 - 15.0 mmol/L    eGFR 80.0 >60.0 mL/min/1.73   Magnesium    Specimen: Blood   Result Value Ref Range    Magnesium 1.7 1.6 - 2.4 mg/dL   CBC Auto Differential    Specimen: Blood   Result Value Ref Range    WBC 4.60 3.40 - 10.80 10*3/mm3    RBC 3.80 (L) 4.14 - 5.80 10*6/mm3    Hemoglobin 12.6 (L) 13.0 - 17.7 g/dL    Hematocrit 36.9 (L) 37.5 - 51.0 %    MCV 97.0 79.0 - 97.0 fL    MCH 33.0 26.6 - 33.0 pg    MCHC 34.0 31.5 - 35.7 g/dL    RDW 13.5 12.3 - 15.4 %    RDW-SD 45.5 37.0 - 54.0 fl    MPV 7.5 6.0 - 12.0 fL    Platelets 170 140 - 450 10*3/mm3    Neutrophil % 56.6 42.7 - 76.0 %    Lymphocyte % 29.0 19.6 - 45.3 %    Monocyte % 11.7 5.0 - 12.0 %    Eosinophil % 2.0 0.3 - 6.2 %    Basophil % 0.7 0.0 - 1.5 %    Neutrophils, Absolute 2.60 1.70 - 7.00 10*3/mm3    Lymphocytes, Absolute 1.30 0.70 - 3.10 10*3/mm3    Monocytes, Absolute 0.50 0.10 - 0.90 10*3/mm3    Eosinophils, Absolute 0.10 0.00 - 0.40 10*3/mm3    Basophils, Absolute 0.00 0.00 - 0.20 10*3/mm3    nRBC 0.1 0.0 - 0.2 /100 WBC   POC Glucose Once    Specimen: Blood   Result Value Ref Range    Glucose 100 70 - 105 mg/dL   POC Glucose Once    Specimen: Blood   Result Value Ref Range    Glucose 170 (H) 70 - 105 mg/dL   ECG 12 Lead   Result Value Ref Range    QT Interval 492 ms     XR Chest 1 View    Result Date: 8/3/2022  No acute chest findings. No significant change from 2/21/2020  Electronically Signed By-Amisha Sanchez MD On:8/3/2022 12:26 PM This report was finalized on [id number] by  Amisha Sanchez MD.    Medications   sodium chloride 0.9 % flush 10 mL (has no administration in time range)   nitroglycerin (NITROSTAT) SL tablet 0.4 mg (has no administration in time range)   sodium chloride 0.9 % flush 10 mL (10 mL Intravenous Given 8/3/22 2127)   sodium chloride 0.9 % flush 10 mL (has no administration in time range)   ondansetron (ZOFRAN) tablet 4 mg (has no administration in time range)     Or   ondansetron (ZOFRAN) injection 4 mg  (has no administration in time range)   melatonin tablet 5 mg (has no administration in time range)   Enoxaparin Sodium (LOVENOX) syringe 40 mg (40 mg Subcutaneous Given 8/3/22 1732)   dextrose (GLUTOSE) oral gel 15 g (has no administration in time range)   dextrose (D50W) (25 g/50 mL) IV injection 25 g (has no administration in time range)   glucagon (human recombinant) (GLUCAGEN DIAGNOSTIC) 1 mg in sterile water (preservative free) 1 mL injection (has no administration in time range)   insulin lispro (ADMELOG) injection 0-9 Units (0 Units Subcutaneous Not Given 8/3/22 1743)   insulin glargine (LANTUS, SEMGLEE) injection 10 Units (10 Units Subcutaneous Not Given 8/3/22 2020)   amLODIPine (NORVASC) tablet 5 mg (has no administration in time range)   aspirin EC tablet 81 mg (has no administration in time range)   atorvastatin (LIPITOR) tablet 20 mg (has no administration in time range)   clopidogrel (PLAVIX) tablet 75 mg (has no administration in time range)   isosorbide mononitrate (IMDUR) 24 hr tablet 30 mg (has no administration in time range)   lisinopril (PRINIVIL,ZESTRIL) tablet 20 mg (has no administration in time range)   metoprolol tartrate (LOPRESSOR) tablet 50 mg (has no administration in time range)   ranolazine (RANEXA) 12 hr tablet 1,000 mg (has no administration in time range)   tiZANidine (ZANAFLEX) tablet 4 mg (has no administration in time range)   aspirin tablet 325 mg (325 mg Oral Given 8/3/22 1438)   amLODIPine (NORVASC) tablet 5 mg (5 mg Oral Given 8/3/22 2126)   atorvastatin (LIPITOR) tablet 20 mg (20 mg Oral Given 8/3/22 2126)   lisinopril (PRINIVIL,ZESTRIL) tablet 20 mg (20 mg Oral Given 8/3/22 2126)   ranolazine (RANEXA) 12 hr tablet 1,000 mg (1,000 mg Oral Given 8/3/22 2126)             EKG my interpretation normal sinus rhythm rate of 50 left bundle branch block QTC of 437 no change from previous other than slower rate                             MDM  Number of Diagnoses or Management  Options  Chest pain, unspecified type: new and requires workup  Diagnosis management comments: Medical decision making IV established monitor which showed sinus rhythm on my review.  EKG obtained reviewed by me sinus rhythm left bundle branch block.  No change from previous reviewed by me chest x-ray obtained reviewed by me as well as radiology unremarkable.  Labs obtained CBC electrolytes unremarkable blood sugar 196 troponin magnesium within normal limits hemoglobin was 12.6.  Patient remained stable otherwise heart rate mainly about 45-50 range.  He had no further pain I do not see evidence of acute DVT pulmonary embolism or dissection or acute stroke or acute myocardial infarction.  The patient states that this feels similar to previous times that his heart was clogged up.  He is resting comfortably all labs chest x-ray EKG reviewed by me the patient will be placed in observation for serial troponins and cardiac consultation and further work-up.  Patient made aware of findings provider notified stable unremarkable ER course       Amount and/or Complexity of Data Reviewed  Clinical lab tests: reviewed  Tests in the radiology section of CPT®: reviewed  Discuss the patient with other providers: yes    Risk of Complications, Morbidity, and/or Mortality  Presenting problems: high  Diagnostic procedures: high  Management options: high    Patient Progress  Patient progress: stable      Final diagnoses:   Chest pain, unspecified type       ED Disposition  ED Disposition     ED Disposition   Decision to Admit    Condition   --    Comment   --             No follow-up provider specified.       Medication List      No changes were made to your prescriptions during this visit.          Jb Shultz MD  08/04/22 0700

## 2022-08-03 NOTE — H&P
[facility] Observation Unit H&P    Patient Name: Kaveh Vences  : 1950  MRN: [id number]  Primary Care Physician: Skinny Greene MD  Date of admission: 8/3/2022     Patient Care Team:  Skinny Greene MD as PCP - General  TRACY Man MD (Cardiology)          Subjective   History Present Illness     Chief Complaint:   Chief Complaint   Patient presents with   • Dizziness     Dizzy, nausea, weakness,started 630 am when woke up         8/3/2022: Patient reports most concerning symptom being significant and worsening fatigue over approximately the past 3 days.  This has progressed to the point that is caused significant difficulty even with performing his normal ADLs and work.  Some mild left arm and chest pain is also been present but he denies any dyspnea, cough or fever, nausea or vomiting, palpitations, peripheral edema, syncope or near syncope.      Review of Systems   Constitutional: Positive for malaise/fatigue. Negative for fever.   HENT: Negative.    Eyes: Negative.    Cardiovascular: Positive for chest pain. Negative for leg swelling, near-syncope, orthopnea, palpitations and syncope.   Respiratory: Negative.    Skin: Negative.    Musculoskeletal: Negative.    Gastrointestinal: Negative.    Genitourinary: Negative.    Neurological: Negative.    Psychiatric/Behavioral: Negative.            Personal History     Past Medical History:   Past Medical History:   Diagnosis Date   • Atrial fibrillation (HCC)    • Coronary artery disease    • Diabetes mellitus (HCC)    • Gallstones    • Hyperlipidemia    • Hypertension    • Injury of back    • Low back pain    • Mitral regurgitation    • Neck pain    • Rheumatoid arthritis (HCC)    • Sleep apnea    • Valvular disease        Surgical History:      Past Surgical History:   Procedure Laterality Date   • BACK SURGERY  2019   • CARDIAC CATHETERIZATION  , , ,    • CHOLECYSTECTOMY  2015   • CORONARY ANGIOPLASTY  ,    • CORONARY ARTERY  BYPASS GRAFT  05/11/2012    x2   • ROTATOR CUFF REPAIR Left            Family History: family history includes Coronary artery disease in his brother and father; Diabetes in his maternal uncle; Heart attack in his brother; Stroke in his brother. Otherwise pertinent FHx was reviewed and unremarkable.     Social History:  reports that he has never smoked. He has never used smokeless tobacco. He reports that he does not drink alcohol and does not use drugs.      Medications:  Prior to Admission medications    Medication Sig Start Date End Date Taking? Authorizing Provider   amLODIPine (NORVASC) 5 MG tablet Take 1 tablet by mouth once daily 4/5/22   Cris Mcneill MD   aspirin 81 MG EC tablet Take 81 mg by mouth Daily.    ProviderMaureen MD   atorvastatin (LIPITOR) 20 MG tablet Take 1 tablet by mouth once daily 5/4/22   Cris Mcneill MD   clopidogrel (PLAVIX) 75 MG tablet Take 1 tablet by mouth once daily 5/5/22   Cris Mcneill MD   isosorbide mononitrate (IMDUR) 30 MG 24 hr tablet Take 1 tablet by mouth once daily 4/19/22   Cris Mcneill MD   lisinopril (PRINIVIL,ZESTRIL) 20 MG tablet Take 1 tablet by mouth twice daily 6/7/22   Cris Mcneill MD   metFORMIN ER (GLUCOPHAGE-XR) 500 MG 24 hr tablet TAKE 2 TABLETS BY MOUTH TWICE DAILY FOR 30 DAYS 8/13/20   Provider, MD Maureen   metoprolol tartrate (LOPRESSOR) 50 MG tablet Take 1 tablet by mouth twice daily 5/4/22   Cris Mcneill MD   nitroglycerin (NITROSTAT) 0.4 MG SL tablet Place 1 tablet under the tongue Every 5 (Five) Minutes As Needed for Chest Pain. Take no more than 3 doses in 15 minutes. 3/24/21   Cris Mcneill MD   ranolazine (RANEXA) 1000 MG 12 hr tablet TAKE 1  BY MOUTH EVERY 12 HOURS 7/13/22   Cris Mcneill MD   tiZANidine (ZANAFLEX) 4 MG tablet Take 1 tablet by mouth Every 8 (Eight) Hours As Needed for Muscle Spasms. 6/14/22   Chloé Beltran MD       Allergies:  No  Known Allergies    Objective   Objective     Vital Signs  Temp:  [97.7 °F (36.5 °C)] 97.7 °F (36.5 °C)  Heart Rate:  [44-53] 53  Resp:  [14-18] 17  BP: (104-128)/(59-72) 104/62  SpO2:  [95 %-96 %] 96 %  on   ;   Device (Oxygen Therapy): room air  Body mass index is 28.98 kg/m².    Physical Exam  Vitals reviewed.   Constitutional:       General: He is not in acute distress.     Appearance: Normal appearance. He is normal weight. He is not ill-appearing, toxic-appearing or diaphoretic.   HENT:      Head: Normocephalic.      Right Ear: External ear normal.      Left Ear: External ear normal.      Nose: Nose normal.      Mouth/Throat:      Mouth: Mucous membranes are moist.   Eyes:      Extraocular Movements: Extraocular movements intact.   Cardiovascular:      Rate and Rhythm: Normal rate and regular rhythm.      Pulses: Normal pulses.      Heart sounds: Normal heart sounds.   Pulmonary:      Effort: Pulmonary effort is normal.      Breath sounds: Normal breath sounds.   Abdominal:      General: Bowel sounds are normal.      Palpations: Abdomen is soft.      Tenderness: There is no abdominal tenderness.   Musculoskeletal:         General: Normal range of motion.      Cervical back: Normal range of motion.      Right lower leg: No edema.      Left lower leg: No edema.   Skin:     General: Skin is warm and dry.      Capillary Refill: Capillary refill takes less than 2 seconds.   Neurological:      General: No focal deficit present.      Mental Status: He is alert and oriented to person, place, and time.   Psychiatric:         Mood and Affect: Mood normal.         Behavior: Behavior normal.         Thought Content: Thought content normal.         Judgment: Judgment normal.           Results Review:  I have personally reviewed most recent cardiac tracings, lab results and radiology images and interpretations and agree with findings, most notably: Troponin, CMP, CBC, magnesium, UA, chest x-ray and EKG.    Results from last  7 days   Lab Units 08/03/22  1221   WBC 10*3/mm3 4.90   HEMOGLOBIN g/dL 12.6*   HEMATOCRIT % 36.7*   PLATELETS 10*3/mm3 171     Results from last 7 days   Lab Units 08/03/22  1221   SODIUM mmol/L 136   POTASSIUM mmol/L 4.4   CHLORIDE mmol/L 102   CO2 mmol/L 25.0   BUN mg/dL 16   CREATININE mg/dL 0.83   GLUCOSE mg/dL 196*   CALCIUM mg/dL 8.7   ALT (SGPT) U/L 9   AST (SGOT) U/L 11   TROPONIN T ng/mL <0.010     Estimated Creatinine Clearance: 83.3 mL/min (by C-G formula based on SCr of 0.83 mg/dL).  Brief Urine Lab Results  (Last result in the past 365 days)      Color   Clarity   Blood   Leuk Est   Nitrite   Protein   CREAT   Urine HCG        08/03/22 1442 Yellow   Clear   Negative   Negative   Negative   Negative                 Microbiology Results (last 10 days)     ** No results found for the last 240 hours. **          ECG/EMG Results (most recent)     Procedure Component Value Units Date/Time    ECG 12 Lead [[id number]] Collected: 08/03/22 1035     Updated: 08/03/22 1036     QT Interval 492 ms     Narrative:      HEART RATE= 47  bpm  RR Interval= 1268  ms  TN Interval= 197  ms  P Horizontal Axis= 19  deg  P Front Axis= 40  deg  QRSD Interval= 169  ms  QT Interval= 492  ms  QRS Axis= 15  deg  T Wave Axis= 148  deg  - ABNORMAL ECG -  Sinus bradycardia  Left bundle branch block  Electronically Signed By:   Date and Time of Study: 2022-08-03 10:35:41          Results for orders placed during the hospital encounter of 03/02/21    Duplex Carotid Ultrasound CAR    Interpretation Summary  · Mid right internal carotid artery mild stenosis.  · Mid left internal carotid artery mild stenosis.  · Study Impression: • Right ICA Mid: Imaging indicates 16%-49% stenosis. • Left ICA Mid: Imaging indicates 16-49% stenosis.      Results for orders placed during the hospital encounter of 03/02/21    Adult Transthoracic Echo Complete W/ Cont if Necessary Per Protocol    Interpretation Summary  · Left ventricular wall thickness is  consistent with moderate asymmetric hypertrophy.  · Estimated left ventricular EF = 50% Estimated left ventricular EF was in agreement with the calculated left ventricular EF. Left ventricular systolic function is normal.  · Estimated right ventricular systolic pressure from tricuspid regurgitation is normal (<35 mmHg).  · Moderate dilation of the aortic root is present. Moderate dilation of the ascending aorta is present.  · Left ventricular diastolic function is consistent with (grade I) impaired relaxation.      XR Chest 1 View    Result Date: 8/3/2022  No acute chest findings. No significant change from 2/21/2020  Electronically Signed By-Amisha Sanchez MD On:8/3/2022 12:26 PM This report was finalized on [id number] by  Amisha Sanchez MD.        Estimated Creatinine Clearance: 83.3 mL/min (by C-G formula based on SCr of 0.83 mg/dL).    Assessment & Plan   Assessment/Plan       Active Hospital Problems    Diagnosis  POA   • Chest pain [R07.9]  Yes      Resolved Hospital Problems   No resolved problems to display.     Chest pain with history of CAD  -Initial troponin: Less than 0.00, trend  -Chest X-Ray: No acute findings  -EKG shows sinus bradycardia at 47 with a left bundle branch block, similar to previous study  -In the ED pt given 3 and 25 mg aspirin  -healthy heart diet for now, NPO at midnight  -Check lipid panel  -81mg ASA daily  -Cardiology consulted  -Telemetry  -Continue aspirin, Plavix, statin, Imdur and Ranexa    Anemia  -Hemoglobin: 12.6 with a normal MCV and MCHC  -Monitor while admitted    Hypertension  -Well controlled with a most recent blood pressure of 104/62  -Hold metoprolol secondary to bradycardia  - Continue amlodipine, and lisinopril  - Monitor while admitted    Diabetes mellitus  -Moderately controlled with a most recent glucose of 196  -Hold metformin  -Basal and sliding scale  -Diabetic diet  -Monitor AC and HS    Hyperlipidemia  -Statin                VTE Prophylaxis -    Mechanical Order History:     None      Pharmalogical Order History:     None          CODE STATUS:    There are no questions and answers to display.       This patient has been examined wearing personal protective equipment.     I discussed the patient's findings and my recommendations with patient and nursing staff.      Signature:Electronically signed by Reynaldo Parish PA-C, 08/03/22, 4:53 PM EDT.

## 2022-08-03 NOTE — ED NOTES
Patient has been feeling dizzy, light headed, nauseas, and having sharp pain down through his left arm that comes and goes. He had open heart surgery over 10 years ago.

## 2022-08-04 ENCOUNTER — APPOINTMENT (OUTPATIENT)
Dept: CARDIOLOGY | Facility: HOSPITAL | Age: 72
End: 2022-08-04

## 2022-08-04 ENCOUNTER — APPOINTMENT (OUTPATIENT)
Dept: NUCLEAR MEDICINE | Facility: HOSPITAL | Age: 72
End: 2022-08-04

## 2022-08-04 ENCOUNTER — APPOINTMENT (OUTPATIENT)
Dept: RESPIRATORY THERAPY | Facility: HOSPITAL | Age: 72
End: 2022-08-04

## 2022-08-04 VITALS
BODY MASS INDEX: 27.94 KG/M2 | WEIGHT: 178 LBS | TEMPERATURE: 97.5 F | HEART RATE: 57 BPM | SYSTOLIC BLOOD PRESSURE: 113 MMHG | OXYGEN SATURATION: 96 % | DIASTOLIC BLOOD PRESSURE: 54 MMHG | HEIGHT: 67 IN | RESPIRATION RATE: 16 BRPM

## 2022-08-04 LAB
ANION GAP SERPL CALCULATED.3IONS-SCNC: 9 MMOL/L (ref 5–15)
BASOPHILS # BLD AUTO: 0 10*3/MM3 (ref 0–0.2)
BASOPHILS NFR BLD AUTO: 0.7 % (ref 0–1.5)
BH CV ECHO MEAS - ACS: 2.25 CM
BH CV ECHO MEAS - AO MAX PG: 5.7 MMHG
BH CV ECHO MEAS - AO MEAN PG: 3.1 MMHG
BH CV ECHO MEAS - AO ROOT DIAM: 3.7 CM
BH CV ECHO MEAS - AO V2 MAX: 119.2 CM/SEC
BH CV ECHO MEAS - AO V2 VTI: 25.2 CM
BH CV ECHO MEAS - AVA(I,D): 3.3 CM2
BH CV ECHO MEAS - EDV(CUBED): 106.6 ML
BH CV ECHO MEAS - EDV(MOD-SP4): 100 ML
BH CV ECHO MEAS - EF(MOD-SP4): 55.4 %
BH CV ECHO MEAS - ESV(CUBED): 18.9 ML
BH CV ECHO MEAS - ESV(MOD-SP4): 44.6 ML
BH CV ECHO MEAS - FS: 43.8 %
BH CV ECHO MEAS - IVS/LVPW: 1.36 CM
BH CV ECHO MEAS - IVSD: 1.19 CM
BH CV ECHO MEAS - LA DIMENSION: 5 CM
BH CV ECHO MEAS - LV DIASTOLIC VOL/BSA (35-75): 52 CM2
BH CV ECHO MEAS - LV MASS(C)D: 173.3 GRAMS
BH CV ECHO MEAS - LV MAX PG: 4.1 MMHG
BH CV ECHO MEAS - LV MEAN PG: 2.11 MMHG
BH CV ECHO MEAS - LV SYSTOLIC VOL/BSA (12-30): 23.2 CM2
BH CV ECHO MEAS - LV V1 MAX: 100.9 CM/SEC
BH CV ECHO MEAS - LV V1 VTI: 24.1 CM
BH CV ECHO MEAS - LVIDD: 4.7 CM
BH CV ECHO MEAS - LVIDS: 2.7 CM
BH CV ECHO MEAS - LVOT AREA: 3.4 CM2
BH CV ECHO MEAS - LVOT DIAM: 2.08 CM
BH CV ECHO MEAS - LVPWD: 0.87 CM
BH CV ECHO MEAS - MR MAX PG: 132.5 MMHG
BH CV ECHO MEAS - MR MAX VEL: 575.5 CM/SEC
BH CV ECHO MEAS - MV A MAX VEL: 83.7 CM/SEC
BH CV ECHO MEAS - MV DEC SLOPE: 334.5 CM/SEC2
BH CV ECHO MEAS - MV DEC TIME: 0.23 MSEC
BH CV ECHO MEAS - MV E MAX VEL: 78.3 CM/SEC
BH CV ECHO MEAS - MV E/A: 0.94
BH CV ECHO MEAS - MV MAX PG: 4.3 MMHG
BH CV ECHO MEAS - MV MEAN PG: 1.54 MMHG
BH CV ECHO MEAS - MV V2 VTI: 34.4 CM
BH CV ECHO MEAS - MVA(VTI): 2.39 CM2
BH CV ECHO MEAS - PA ACC TIME: 0.1 SEC
BH CV ECHO MEAS - PA PR(ACCEL): 33.8 MMHG
BH CV ECHO MEAS - PA V2 MAX: 100.6 CM/SEC
BH CV ECHO MEAS - PI END-D VEL: 110.6 CM/SEC
BH CV ECHO MEAS - PULM A REVS DUR: 0.09 SEC
BH CV ECHO MEAS - PULM A REVS VEL: 27.2 CM/SEC
BH CV ECHO MEAS - PULM DIAS VEL: 55 CM/SEC
BH CV ECHO MEAS - PULM S/D: 1.01
BH CV ECHO MEAS - PULM SYS VEL: 55.3 CM/SEC
BH CV ECHO MEAS - QP/QS: 1.28
BH CV ECHO MEAS - RAP SYSTOLE: 3 MMHG
BH CV ECHO MEAS - RV MAX PG: 2.31 MMHG
BH CV ECHO MEAS - RV V1 MAX: 75.9 CM/SEC
BH CV ECHO MEAS - RV V1 VTI: 17 CM
BH CV ECHO MEAS - RVDD: 3.1 CM
BH CV ECHO MEAS - RVOT DIAM: 2.8 CM
BH CV ECHO MEAS - RVSP: 34.3 MMHG
BH CV ECHO MEAS - SI(MOD-SP4): 28.8 ML/M2
BH CV ECHO MEAS - SV(LVOT): 82 ML
BH CV ECHO MEAS - SV(MOD-SP4): 55.4 ML
BH CV ECHO MEAS - SV(RVOT): 105 ML
BH CV ECHO MEAS - TR MAX PG: 31.3 MMHG
BH CV ECHO MEAS - TR MAX VEL: 279.9 CM/SEC
BH CV REST NUCLEAR ISOTOPE DOSE: 11 MCI
BH CV STRESS BP STAGE 1: NORMAL
BH CV STRESS BP STAGE 2: NORMAL
BH CV STRESS COMMENTS STAGE 1: NORMAL
BH CV STRESS COMMENTS STAGE 2: NORMAL
BH CV STRESS DOSE REGADENOSON STAGE 1: 0.4
BH CV STRESS DURATION MIN STAGE 1: 0
BH CV STRESS DURATION MIN STAGE 2: 4
BH CV STRESS DURATION SEC STAGE 1: 10
BH CV STRESS DURATION SEC STAGE 2: 0
BH CV STRESS HR STAGE 1: 54
BH CV STRESS HR STAGE 2: 75
BH CV STRESS NUCLEAR ISOTOPE DOSE: 30.7 MCI
BH CV STRESS PROTOCOL 1: NORMAL
BH CV STRESS RECOVERY BP: NORMAL MMHG
BH CV STRESS RECOVERY HR: 61 BPM
BH CV STRESS STAGE 1: 1
BH CV STRESS STAGE 2: 2
BUN SERPL-MCNC: 17 MG/DL (ref 8–23)
BUN/CREAT SERPL: 17 (ref 7–25)
CALCIUM SPEC-SCNC: 8.9 MG/DL (ref 8.6–10.5)
CHLORIDE SERPL-SCNC: 107 MMOL/L (ref 98–107)
CO2 SERPL-SCNC: 26 MMOL/L (ref 22–29)
CREAT SERPL-MCNC: 1 MG/DL (ref 0.76–1.27)
DEPRECATED RDW RBC AUTO: 45.5 FL (ref 37–54)
EGFRCR SERPLBLD CKD-EPI 2021: 80 ML/MIN/1.73
EOSINOPHIL # BLD AUTO: 0.1 10*3/MM3 (ref 0–0.4)
EOSINOPHIL NFR BLD AUTO: 2 % (ref 0.3–6.2)
ERYTHROCYTE [DISTWIDTH] IN BLOOD BY AUTOMATED COUNT: 13.5 % (ref 12.3–15.4)
GLUCOSE BLDC GLUCOMTR-MCNC: 127 MG/DL (ref 70–105)
GLUCOSE BLDC GLUCOMTR-MCNC: 133 MG/DL (ref 70–105)
GLUCOSE SERPL-MCNC: 134 MG/DL (ref 65–99)
HCT VFR BLD AUTO: 36.9 % (ref 37.5–51)
HGB BLD-MCNC: 12.6 G/DL (ref 13–17.7)
LV EF 2D ECHO EST: 60 %
LV EF NUC BP: 60 %
LYMPHOCYTES # BLD AUTO: 1.3 10*3/MM3 (ref 0.7–3.1)
LYMPHOCYTES NFR BLD AUTO: 29 % (ref 19.6–45.3)
MAGNESIUM SERPL-MCNC: 1.7 MG/DL (ref 1.6–2.4)
MAXIMAL PREDICTED HEART RATE: 148 BPM
MAXIMAL PREDICTED HEART RATE: 148 BPM
MCH RBC QN AUTO: 33 PG (ref 26.6–33)
MCHC RBC AUTO-ENTMCNC: 34 G/DL (ref 31.5–35.7)
MCV RBC AUTO: 97 FL (ref 79–97)
MONOCYTES # BLD AUTO: 0.5 10*3/MM3 (ref 0.1–0.9)
MONOCYTES NFR BLD AUTO: 11.7 % (ref 5–12)
NEUTROPHILS NFR BLD AUTO: 2.6 10*3/MM3 (ref 1.7–7)
NEUTROPHILS NFR BLD AUTO: 56.6 % (ref 42.7–76)
NRBC BLD AUTO-RTO: 0.1 /100 WBC (ref 0–0.2)
PERCENT MAX PREDICTED HR: 54.73 %
PLATELET # BLD AUTO: 170 10*3/MM3 (ref 140–450)
PMV BLD AUTO: 7.5 FL (ref 6–12)
POTASSIUM SERPL-SCNC: 4.1 MMOL/L (ref 3.5–5.2)
RBC # BLD AUTO: 3.8 10*6/MM3 (ref 4.14–5.8)
SODIUM SERPL-SCNC: 142 MMOL/L (ref 136–145)
STRESS BASELINE BP: NORMAL MMHG
STRESS BASELINE HR: 54 BPM
STRESS PERCENT HR: 64 %
STRESS POST PEAK BP: NORMAL MMHG
STRESS POST PEAK HR: 81 BPM
STRESS TARGET HR: 126 BPM
STRESS TARGET HR: 126 BPM
WBC NRBC COR # BLD: 4.6 10*3/MM3 (ref 3.4–10.8)

## 2022-08-04 PROCEDURE — G0378 HOSPITAL OBSERVATION PER HR: HCPCS

## 2022-08-04 PROCEDURE — 83735 ASSAY OF MAGNESIUM: CPT | Performed by: PHYSICIAN ASSISTANT

## 2022-08-04 PROCEDURE — 93306 TTE W/DOPPLER COMPLETE: CPT | Performed by: INTERNAL MEDICINE

## 2022-08-04 PROCEDURE — 93306 TTE W/DOPPLER COMPLETE: CPT

## 2022-08-04 PROCEDURE — 82962 GLUCOSE BLOOD TEST: CPT

## 2022-08-04 PROCEDURE — 25010000002 REGADENOSON 0.4 MG/5ML SOLUTION: Performed by: EMERGENCY MEDICINE

## 2022-08-04 PROCEDURE — 93018 CV STRESS TEST I&R ONLY: CPT | Performed by: INTERNAL MEDICINE

## 2022-08-04 PROCEDURE — 78452 HT MUSCLE IMAGE SPECT MULT: CPT | Performed by: INTERNAL MEDICINE

## 2022-08-04 PROCEDURE — 0 TECHNETIUM TETROFOSMIN KIT: Performed by: EMERGENCY MEDICINE

## 2022-08-04 PROCEDURE — 80048 BASIC METABOLIC PNL TOTAL CA: CPT | Performed by: PHYSICIAN ASSISTANT

## 2022-08-04 PROCEDURE — 85025 COMPLETE CBC W/AUTO DIFF WBC: CPT | Performed by: PHYSICIAN ASSISTANT

## 2022-08-04 PROCEDURE — 78452 HT MUSCLE IMAGE SPECT MULT: CPT

## 2022-08-04 PROCEDURE — 93017 CV STRESS TEST TRACING ONLY: CPT

## 2022-08-04 PROCEDURE — A9502 TC99M TETROFOSMIN: HCPCS | Performed by: EMERGENCY MEDICINE

## 2022-08-04 PROCEDURE — 99214 OFFICE O/P EST MOD 30 MIN: CPT | Performed by: INTERNAL MEDICINE

## 2022-08-04 RX ORDER — LISINOPRIL 20 MG/1
20 TABLET ORAL 2 TIMES DAILY
Status: DISCONTINUED | OUTPATIENT
Start: 2022-08-04 | End: 2022-08-04 | Stop reason: HOSPADM

## 2022-08-04 RX ORDER — ISOSORBIDE MONONITRATE 30 MG/1
30 TABLET, EXTENDED RELEASE ORAL DAILY
Status: DISCONTINUED | OUTPATIENT
Start: 2022-08-04 | End: 2022-08-04 | Stop reason: HOSPADM

## 2022-08-04 RX ORDER — AMLODIPINE BESYLATE 5 MG/1
5 TABLET ORAL DAILY
Status: DISCONTINUED | OUTPATIENT
Start: 2022-08-04 | End: 2022-08-04 | Stop reason: HOSPADM

## 2022-08-04 RX ORDER — RANOLAZINE 500 MG/1
1000 TABLET, EXTENDED RELEASE ORAL EVERY 12 HOURS SCHEDULED
Status: DISCONTINUED | OUTPATIENT
Start: 2022-08-04 | End: 2022-08-04 | Stop reason: HOSPADM

## 2022-08-04 RX ORDER — ATORVASTATIN CALCIUM 20 MG/1
20 TABLET, FILM COATED ORAL DAILY
Status: DISCONTINUED | OUTPATIENT
Start: 2022-08-04 | End: 2022-08-04 | Stop reason: HOSPADM

## 2022-08-04 RX ORDER — METOPROLOL TARTRATE 50 MG/1
50 TABLET, FILM COATED ORAL 2 TIMES DAILY
Status: DISCONTINUED | OUTPATIENT
Start: 2022-08-04 | End: 2022-08-04

## 2022-08-04 RX ORDER — TIZANIDINE 4 MG/1
4 TABLET ORAL EVERY 8 HOURS PRN
Status: DISCONTINUED | OUTPATIENT
Start: 2022-08-04 | End: 2022-08-04 | Stop reason: HOSPADM

## 2022-08-04 RX ORDER — ASPIRIN 81 MG/1
81 TABLET ORAL DAILY
Status: DISCONTINUED | OUTPATIENT
Start: 2022-08-04 | End: 2022-08-04 | Stop reason: HOSPADM

## 2022-08-04 RX ORDER — CLOPIDOGREL BISULFATE 75 MG/1
75 TABLET ORAL DAILY
Status: DISCONTINUED | OUTPATIENT
Start: 2022-08-04 | End: 2022-08-04 | Stop reason: HOSPADM

## 2022-08-04 RX ADMIN — ISOSORBIDE MONONITRATE 30 MG: 30 TABLET, EXTENDED RELEASE ORAL at 12:08

## 2022-08-04 RX ADMIN — ASPIRIN 81 MG: 81 TABLET, COATED ORAL at 07:46

## 2022-08-04 RX ADMIN — LISINOPRIL 20 MG: 20 TABLET ORAL at 12:08

## 2022-08-04 RX ADMIN — TETROFOSMIN 1 DOSE: 1.38 INJECTION, POWDER, LYOPHILIZED, FOR SOLUTION INTRAVENOUS at 10:12

## 2022-08-04 RX ADMIN — ATORVASTATIN CALCIUM 20 MG: 20 TABLET, FILM COATED ORAL at 07:46

## 2022-08-04 RX ADMIN — AMLODIPINE BESYLATE 5 MG: 5 TABLET ORAL at 07:46

## 2022-08-04 RX ADMIN — CLOPIDOGREL BISULFATE 75 MG: 75 TABLET ORAL at 07:46

## 2022-08-04 RX ADMIN — Medication 10 ML: at 07:50

## 2022-08-04 RX ADMIN — TETROFOSMIN 1 DOSE: 1.38 INJECTION, POWDER, LYOPHILIZED, FOR SOLUTION INTRAVENOUS at 09:32

## 2022-08-04 RX ADMIN — RANOLAZINE 1000 MG: 500 TABLET, FILM COATED, EXTENDED RELEASE ORAL at 07:46

## 2022-08-04 RX ADMIN — REGADENOSON 0.4 MG: 0.08 INJECTION, SOLUTION INTRAVENOUS at 10:12

## 2022-08-04 NOTE — CONSULTS
Cardiology Consult Note      REQUESTING PHYSICIAN    Jb Shultz MD    PATIENT IDENTIFICATION  Name: Kaveh Vences  Age: 71 y.o.  Sex: male  :  1950  MRN: [id number]             Cardiology assessment and plan    Chest discomfort  Fatigue and weakness  Prior known coronary disease prior coronary bypass surgery  Cardiac catheterization in  with patent stents  History of prior PCI and stenting  Dilated ascending aorta  Hypertension  Hyperlipidemia    T-max is 97.7 pulse is 59 respirations are 15 blood pressure is 132/72 sats are 94%  Normal troponin  Sodium is 142 potassium is 4.1 creatinine is 1.0 hemoglobin is 12.6  Twelve-lead EKG shows sinus bradycardia with left bundle branch block  Current medications include aspirin 81 mg p.o. once a day and Norvasc 5 mg p.o. once a day Lipitor 20 mg p.o. once a day isosorbide 30 mg p.o. once a day lisinopril 20 mg p.o. once a day metoprolol 50 mg p.o. twice daily Ranexa 1000 mg p.o. twice daily Plavix 75 mg p.o. once a day patient is on Lovenox for DVT prophylaxis  Results of the echocardiogram and stress test reviewed and discussed with patient  Plans to discharge patient home today  Follow-up in office  Decrease the dose of metoprolol to 25 mg p.o. twice daily  Extended Holter monitor for 4 weeks at the time of discharge        Interpretation Summary    · Left ventricular wall thickness is consistent with mild concentric hypertrophy.  · Estimated left ventricular EF = 60% Left ventricular systolic function is normal.  · There is calcification of the aortic valve mainly affecting the left coronary cusp(s).  · Left ventricular diastolic function is consistent with (grade I) impaired relaxation.  · Mild dilation of the aortic root is present.  · Estimated right ventricular systolic pressure from tricuspid regurgitation is normal (<35 mmHg).    Interpretation Summary    · Myocardial perfusion study did not show any evidence of any significant reversible  ischemia  · Myocardial perfusion study shows moderate size severe intensity mostly fixed perfusion defect involving the anterior anteroapical wall and anteroseptal wall likely secondary to underlying attenuation artifact from left bundle branch block  · Left ventricular ejection fraction is normal. (Calculated EF = 60%).  · Impressions are consistent with an intermediate risk study.  · Clinical correlation is recommended    Chart and labs reviewed.  History and exam findings are verified with above changes noted.  Assessment and plan notated by APC after being formulated by attending consultant.  Note that greater than 50% of the time spent in care of the patient was provided by attending consultant.      REASON FOR CONSULTATION:  71 y.o. male with history of diabetes,  coronary artery disease, status post previous CABG in 2012, and PCI stenting.     LV ejection fraction of 40%, severe native three-vessel coronary disease with 90% calcified stenosis involving the distal left main involving the ostium of the LAD significant 99% lesion of left circumflex coronary artery.  100% occlusion of the nondominant right coronary artery was noted as well.  [place] to the LAD was patent and saphenous vein graft to marginal branch was patent as well. Echocardiogram with LV ejection fraction of 45% with moderate mitral regurgitation    TTE 3/5/2022: EF 50%, moderate dilatation of the aortic root and ascending aorta, grade 1 DD      CC:  Fatigue  Chest discomfort    HISTORY OF PRESENT ILLNESS:   Patient presented to the emergency department at [facility] 8/3/2022 with complaint of weakness, fatigue, lack of energy.  He also reported some left-sided chest discomfort described as pressure in his chest without radiation, mild intensity.  He denies any associated symptoms.  Symptoms off and on for approximately 3 days.  In the ED, work-up includes negative serial cardiac enzymes.  No acute EKG changes.  Upon my evaluation, he is  "resting comfortably in bed and denies any discomfort at present.  Patient difficult historian, unsure when his discomfort subsided.      REVIEW OF SYSTEMS:  Pertinent items are noted in HPI, all other systems reviewed and negative    OBJECTIVE   Troponin negative x2    EKG: Sinus rhythm with left bundle branch block, rate 47-rhythm unchanged from prior    ASSESSMENT  Chest pain  Fatigue  Lower extremity weakness  Multivessel coronary artery disease  History of prior CABG  History of percutaneous coronary intervention  Dilatation of aortic root/ascending aorta  Valvular heart disease    PLAN  No evidence of acute coronary syndrome with negative serial cardiac enzymes and no acute findings on EKG  We will check 2D echocardiogram  Schedule patient for nuclear stress testing today  Patient is currently pain-free  Patient is on BB, ACE, isosorbide, amlodipine, Plavix, aspirin and statin  Further recommendations following results of diagnostics        Vital Signs  Visit Vitals  /73 (BP Location: Right arm, Patient Position: Lying)   Pulse 59   Temp 97.7 °F (36.5 °C) (Oral)   Resp 15   Ht 170.2 cm (67\")   Wt 81 kg (178 lb 9.6 oz)   SpO2 95%   BMI 27.97 kg/m²     Oxygen Therapy  SpO2: 95 %  Device (Oxygen Therapy): room air  Flowsheet Rows    Flowsheet Row First Filed Value   Admission Height 170.2 cm (67\") Documented at 08/03/2022 1020   Admission Weight 83.9 kg (185 lb) Documented at 08/03/2022 1020        Intake & Output (last 3 days)       08/01 0701  08/02 0700 08/02 0701  08/03 0700 08/03 0701  08/04 0700 08/04 0701  08/05 0700    P.O.   240     Total Intake(mL/kg)   240 (3)     Net   +240             Urine Unmeasured Occurrence   2 x         Lines, Drains & Airways     Active LDAs     Name Placement date Placement time Site Days    Peripheral IV 08/03/22 1222 Right Forearm 08/03/22  1222  Forearm  less than 1                MEDICAL HISTORY    Past Medical History:   Diagnosis Date   • Atrial fibrillation (HCC)  " "  • Coronary artery disease    • Diabetes mellitus (HCC)    • Gallstones    • Hyperlipidemia    • Hypertension    • Injury of back    • Low back pain    • Mitral regurgitation    • Neck pain    • Rheumatoid arthritis (HCC)    • Sleep apnea    • Valvular disease         SURGICAL HISTORY    Past Surgical History:   Procedure Laterality Date   • BACK SURGERY  01/2019   • CARDIAC CATHETERIZATION  2006, 2009, 2012, 2018   • CHOLECYSTECTOMY  12/22/2015   • CORONARY ANGIOPLASTY  2006, 2009   • CORONARY ARTERY BYPASS GRAFT  05/11/2012    x2   • ROTATOR CUFF REPAIR Left         FAMILY HISTORY    Family History   Problem Relation Age of Onset   • Coronary artery disease Father    • Coronary artery disease Brother    • Heart attack Brother    • Stroke Brother    • Diabetes Maternal Uncle        SOCIAL HISTORY    Social History     Tobacco Use   • Smoking status: Never Smoker   • Smokeless tobacco: Never Used   Substance Use Topics   • Alcohol use: No        ALLERGIES    No Known Allergies           /73 (BP Location: Right arm, Patient Position: Lying)   Pulse 59   Temp 97.7 °F (36.5 °C) (Oral)   Resp 15   Ht 170.2 cm (67\")   Wt 81 kg (178 lb 9.6 oz)   SpO2 95%   BMI 27.97 kg/m²   Intake/Output last 3 shifts:  I/O last 3 completed shifts:  In: 240 [P.O.:240]  Out: -   Intake/Output this shift:  No intake/output data recorded.    PHYSICAL EXAM:    General: Alert, cooperative, no distress, appears stated age  Head:  Normocephalic, atraumatic, mucous membranes moist  Eyes:  Conjunctivae/corneas clear, EOM's intact     Neck:  Supple,  no adenopathy; no JVD or bruit  Lungs: Clear to auscultation bilaterally, no wheezes, rhonchi or rales are noted  Chest wall: No tenderness  Heart::  Regular rate and rhythm, S1 and S2 normal, no murmur, rub or gallop  Abdomen: Soft, nontender, nondistended, bowel sounds active  Extremities: No cyanosis, clubbing, or edema   Pulses: 2+ and symmetric all extremities  Skin:  No rashes or " lesions  Neuro/psych: A&O x3. CN II through XII are grossly intact with appropriate affect      Scheduled Meds:      amLODIPine, 5 mg, Oral, Daily  aspirin, 81 mg, Oral, Daily  atorvastatin, 20 mg, Oral, Daily  clopidogrel, 75 mg, Oral, Daily  enoxaparin, 40 mg, Subcutaneous, Daily  insulin glargine, 10 Units, Subcutaneous, Nightly  insulin lispro, 0-9 Units, Subcutaneous, TID AC  isosorbide mononitrate, 30 mg, Oral, Daily  lisinopril, 20 mg, Oral, BID  metoprolol tartrate, 50 mg, Oral, BID  ranolazine, 1,000 mg, Oral, Q12H  sodium chloride, 10 mL, Intravenous, Q12H        Continuous Infusions:         PRN Meds:    dextrose  •  dextrose  •  glucagon (human recombinant)  •  melatonin  •  nitroglycerin  •  ondansetron **OR** ondansetron  •  [COMPLETED] Insert peripheral IV **AND** sodium chloride  •  sodium chloride  •  tiZANidine        Results Review:     I reviewed the patient's new clinical results.    CBC    Results from last 7 days   Lab Units 08/04/22  0357 08/03/22  1221   WBC 10*3/mm3 4.60 4.90   HEMOGLOBIN g/dL 12.6* 12.6*   PLATELETS 10*3/mm3 170 171     Cr Clearance Estimated Creatinine Clearance: 68.1 mL/min (by C-G formula based on SCr of 1 mg/dL).  Coag     HbA1C No results found for: HGBA1C  Blood Glucose   Glucose   Date/Time Value Ref Range Status   08/04/2022 0704 127 (H) 70 - 105 mg/dL Final     Comment:     Serial Number: [id number]:  [id number]   08/03/2022 2112 170 (H) 70 - 105 mg/dL Final     Comment:     Serial Number: [id number]:  [id number]   08/03/2022 1740 100 70 - 105 mg/dL Final     Comment:     Serial Number: [id number]:  [id number]     Infection     CMP   Results from last 7 days   Lab Units 08/04/22  0357 08/03/22  1221   SODIUM mmol/L 142 136   POTASSIUM mmol/L 4.1 4.4   CHLORIDE mmol/L 107 102   CO2 mmol/L 26.0 25.0   BUN mg/dL 17 16   CREATININE mg/dL 1.00 0.83   GLUCOSE mg/dL 134* 196*   ALBUMIN g/dL  --  4.10   BILIRUBIN mg/dL  --  0.6   ALK PHOS U/L  --  46    AST (SGOT) U/L  --  11   ALT (SGPT) U/L  --  9     ABG      UA    Results from last 7 days   Lab Units 08/03/22  1442   NITRITE UA  Negative     LENA  No results found for: POCMETH, POCAMPHET, POCBARBITUR, POCBENZO, POCCOCAINE, POCOPIATES, POCOXYCODO, POCPHENCYC, POCPROPOXY, POCTHC, POCTRICYC  Lysis Labs   Results from last 7 days   Lab Units 08/04/22  0357 08/03/22  1221   HEMOGLOBIN g/dL 12.6* 12.6*   PLATELETS 10*3/mm3 170 171   CREATININE mg/dL 1.00 0.83     Radiology(recent) XR Chest 1 View    Result Date: 8/3/2022  No acute chest findings. No significant change from 2/21/2020  Electronically Signed By-Amisha Sanchez MD On:8/3/2022 12:26 PM This report was finalized on [id number] by  Amisha Sanchez MD.        Results from last 7 days   Lab Units 08/03/22  1921   TROPONIN T ng/mL <0.010       Xrays, labs reviewed personally by physician.    ECG/EMG Results (most recent)     Procedure Component Value Units Date/Time    ECG 12 Lead [[id number]] Collected: 08/03/22 1035     Updated: 08/03/22 1036     QT Interval 492 ms     Narrative:      HEART RATE= 47  bpm  RR Interval= 1268  ms  NH Interval= 197  ms  P Horizontal Axis= 19  deg  P Front Axis= 40  deg  QRSD Interval= 169  ms  QT Interval= 492  ms  QRS Axis= 15  deg  T Wave Axis= 148  deg  - ABNORMAL ECG -  Sinus bradycardia  Left bundle branch block  Electronically Signed By:   Date and Time of Study: 2022-08-03 10:35:41            Medication Review:   I have reviewed the patient's current medication list  Scheduled Meds:amLODIPine, 5 mg, Oral, Daily  aspirin, 81 mg, Oral, Daily  atorvastatin, 20 mg, Oral, Daily  clopidogrel, 75 mg, Oral, Daily  enoxaparin, 40 mg, Subcutaneous, Daily  insulin glargine, 10 Units, Subcutaneous, Nightly  insulin lispro, 0-9 Units, Subcutaneous, TID AC  isosorbide mononitrate, 30 mg, Oral, Daily  lisinopril, 20 mg, Oral, BID  metoprolol tartrate, 50 mg, Oral, BID  ranolazine, 1,000 mg, Oral, Q12H  sodium chloride, 10 mL,  "Intravenous, Q12H      Continuous Infusions:   PRN Meds:.dextrose  •  dextrose  •  glucagon (human recombinant)  •  melatonin  •  nitroglycerin  •  ondansetron **OR** ondansetron  •  [COMPLETED] Insert peripheral IV **AND** sodium chloride  •  sodium chloride  •  tiZANidine    Imaging:  Imaging Results (Last 72 Hours)     Procedure Component Value Units Date/Time    XR Chest 1 View [[id number]] Collected: 08/03/22 1225     Updated: 08/03/22 1228    Narrative:      DATE OF EXAM:  8/3/2022 12:19 PM     PROCEDURE:  XR CHEST 1 VW-     INDICATIONS:  Chest pain       COMPARISON:  PA and lateral chest 2/21/2020     TECHNIQUE:   Single radiographic view of the chest was obtained.     FINDINGS:  No acute airspace disease. Benign calcified nodules are scattered in  both lungs, and are unchanged. Heart size is borderline enlarged but  stable with median sternotomy and CABG changes. Pulmonary vascular  distribution is normal. There is no pleural effusion or pneumothorax or  acute osseous abnormalities.       Impression:      No acute chest findings. No significant change from 2/21/2020     Electronically Signed By-Amisha Sanchez MD On:8/3/2022 12:26 PM  This report was finalized on [id number] by  Amisha Sanchez MD.            SHERINE Richardson  08/04/22  07:52 EDT       EMR Dragon/Transcription:   \"Dictated utilizing Dragon dictation\".                 Electronically signed by SHERINE Richardson, 08/04/22, 7:52 AM EDT.    "

## 2022-08-04 NOTE — PLAN OF CARE
Goal Outcome Evaluation:  Plan of Care Reviewed With: patient        Progress: improving  Outcome Evaluation: New admission from ED with chest pain. Patient remains pain free throughout shift. VSS. Possible stress myoview today. Will monitor.

## 2022-08-04 NOTE — DISCHARGE SUMMARY
[facility]    Skinny Greene MD    CHIEF COMPLAINT:     Fatigue and chest pain    HISTORY OF PRESENT ILLNESS:    8/3/2022: Patient reports most concerning symptom being significant and worsening fatigue over approximately the past 3 days.  This has progressed to the point that is caused significant difficulty even with performing his normal ADLs and work.  Some mild left arm and chest pain is also been present but he denies any dyspnea, cough or fever, nausea or vomiting, palpitations, peripheral edema, syncope or near syncope.    8/4/2022: Patient reports he was able to sleep well throughout the night though he does continue to experience what seems to be increased fatigue.  No significant pain reported throughout his admission.        Past Medical History:   Diagnosis Date   • Atrial fibrillation (HCC)    • Coronary artery disease    • Diabetes mellitus (HCC)    • Gallstones    • Hyperlipidemia    • Hypertension    • Injury of back    • Low back pain    • Mitral regurgitation    • Neck pain    • Rheumatoid arthritis (HCC)    • Sleep apnea    • Valvular disease      Past Surgical History:   Procedure Laterality Date   • BACK SURGERY  01/2019   • CARDIAC CATHETERIZATION  2006, 2009, 2012, 2018   • CHOLECYSTECTOMY  12/22/2015   • CORONARY ANGIOPLASTY  2006, 2009   • CORONARY ARTERY BYPASS GRAFT  05/11/2012    x2   • ROTATOR CUFF REPAIR Left      Family History   Problem Relation Age of Onset   • Coronary artery disease Father    • Coronary artery disease Brother    • Heart attack Brother    • Stroke Brother    • Diabetes Maternal Uncle      Social History     Tobacco Use   • Smoking status: Never Smoker   • Smokeless tobacco: Never Used   Vaping Use   • Vaping Use: Never used   Substance Use Topics   • Alcohol use: No   • Drug use: No     Medications Prior to Admission   Medication Sig Dispense Refill Last Dose   • amLODIPine (NORVASC) 5 MG tablet Take 1 tablet by mouth once daily 90 tablet 0  8/2/2022 at Unknown time   • aspirin 81 MG EC tablet Take 81 mg by mouth Daily.   8/3/2022 at Unknown time   • atorvastatin (LIPITOR) 20 MG tablet Take 1 tablet by mouth once daily 90 tablet 0 8/2/2022 at Unknown time   • clopidogrel (PLAVIX) 75 MG tablet Take 1 tablet by mouth once daily 90 tablet 2 8/3/2022 at Unknown time   • isosorbide mononitrate (IMDUR) 30 MG 24 hr tablet Take 1 tablet by mouth once daily 90 tablet 0 8/3/2022 at Unknown time   • lisinopril (PRINIVIL,ZESTRIL) 20 MG tablet Take 1 tablet by mouth twice daily 180 tablet 0 8/3/2022 at Unknown time   • metFORMIN ER (GLUCOPHAGE-XR) 500 MG 24 hr tablet TAKE 2 TABLETS BY MOUTH TWICE DAILY FOR 30 DAYS   8/3/2022 at Unknown time   • metoprolol tartrate (LOPRESSOR) 50 MG tablet Take 1 tablet by mouth twice daily 180 tablet 0 8/3/2022 at Unknown time   • ranolazine (RANEXA) 1000 MG 12 hr tablet TAKE 1  BY MOUTH EVERY 12 HOURS 180 tablet 0 8/3/2022 at Unknown time   • magnesium citrate 1.745 GM/30ML solution solution See Admin Instructions.      • nitroglycerin (NITROSTAT) 0.4 MG SL tablet Place 1 tablet under the tongue Every 5 (Five) Minutes As Needed for Chest Pain. Take no more than 3 doses in 15 minutes. 30 tablet 4 More than a month at Unknown time   • tiZANidine (ZANAFLEX) 4 MG tablet Take 1 tablet by mouth Every 8 (Eight) Hours As Needed for Muscle Spasms. 90 tablet 5 More than a month at Unknown time     Allergies:  Patient has no known allergies.      There is no immunization history on file for this patient.        REVIEW OF SYSTEMS:    Review of Systems   Constitutional: Positive for malaise/fatigue. Negative for fever.   HENT: Negative.    Eyes: Negative.    Cardiovascular: Positive for chest pain. Negative for leg swelling, near-syncope, orthopnea, palpitations and syncope.   Respiratory: Negative.    Skin: Negative.    Musculoskeletal: Negative.    Gastrointestinal: Negative.    Genitourinary: Negative.    Neurological: Negative.     Psychiatric/Behavioral: Negative.      Vital Signs  Temp:  [97.7 °F (36.5 °C)-98 °F (36.7 °C)] 97.7 °F (36.5 °C)  Heart Rate:  [44-59] 59  Resp:  [13-17] 15  BP: ()/(45-73) 131/73          Physical Exam:  Physical Exam  Vitals reviewed.   Constitutional:       General: He is not in acute distress.     Appearance: Normal appearance. He is normal weight. He is not ill-appearing, toxic-appearing or diaphoretic.   HENT:      Head: Normocephalic.      Right Ear: External ear normal.      Left Ear: External ear normal.      Nose: Nose normal.      Mouth/Throat:      Mouth: Mucous membranes are moist.   Eyes:      Extraocular Movements: Extraocular movements intact.   Cardiovascular:      Rate and Rhythm: Regular rhythm. Bradycardia present.      Pulses: Normal pulses.      Heart sounds: Normal heart sounds.   Pulmonary:      Effort: Pulmonary effort is normal.      Breath sounds: Normal breath sounds.   Abdominal:      General: Bowel sounds are normal.      Palpations: Abdomen is soft.      Tenderness: There is no abdominal tenderness.   Musculoskeletal:         General: Normal range of motion.      Cervical back: Normal range of motion.      Right lower leg: No edema.      Left lower leg: No edema.   Skin:     General: Skin is warm and dry.      Capillary Refill: Capillary refill takes less than 2 seconds.   Neurological:      General: No focal deficit present.      Mental Status: He is alert and oriented to person, place, and time.   Psychiatric:         Mood and Affect: Mood normal.         Behavior: Behavior normal.         Thought Content: Thought content normal.         Judgment: Judgment normal.         Emotional Behavior:   Normal   Debilities:  None  Results Review:    I reviewed the patient's new clinical results.  Lab Results (most recent)     Procedure Component Value Units Date/Time    POC Glucose Once [[id number]]  (Abnormal) Collected: 08/04/22 1223    Specimen: Blood Updated: 08/04/22 1225      Glucose 133 mg/dL      Comment: Serial Number: [id number]:  [id number]       POC Glucose Once [[id number]]  (Abnormal) Collected: 08/04/22 0704    Specimen: Blood Updated: 08/04/22 0706     Glucose 127 mg/dL      Comment: Serial Number: [id number]:  [id number]       Basic Metabolic Panel [[id number]]  (Abnormal) Collected: 08/04/22 0357    Specimen: Blood Updated: 08/04/22 0451     Glucose 134 mg/dL      BUN 17 mg/dL      Creatinine 1.00 mg/dL      Sodium 142 mmol/L      Potassium 4.1 mmol/L      Chloride 107 mmol/L      CO2 26.0 mmol/L      Calcium 8.9 mg/dL      BUN/Creatinine Ratio 17.0     Anion Gap 9.0 mmol/L      eGFR 80.0 mL/min/1.73      Comment: National Kidney Foundation and American Society of Nephrology (ASN) Task Force recommended calculation based on the Chronic Kidney Disease Epidemiology Collaboration (CKD-EPI) equation refit without adjustment for race.       Narrative:      GFR Normal >60  Chronic Kidney Disease <60  Kidney Failure <15      Magnesium [[id number]]  (Normal) Collected: 08/04/22 0357    Specimen: Blood Updated: 08/04/22 0451     Magnesium 1.7 mg/dL     CBC & Differential [[id number]]  (Abnormal) Collected: 08/04/22 0357    Specimen: Blood Updated: 08/04/22 0429    Narrative:      The following orders were created for panel order CBC & Differential.  Procedure                               Abnormality         Status                     ---------                               -----------         ------                     CBC Auto Differential[[id number]]        Abnormal            Final result                 Please view results for these tests on the individual orders.    CBC Auto Differential [[id number]]  (Abnormal) Collected: 08/04/22 0357    Specimen: Blood Updated: 08/04/22 0429     WBC 4.60 10*3/mm3      RBC 3.80 10*6/mm3      Hemoglobin 12.6 g/dL      Hematocrit 36.9 %      MCV 97.0 fL      MCH 33.0 pg      MCHC 34.0 g/dL      RDW 13.5 %      RDW-SD 45.5 fl      MPV 7.5  fL      Platelets 170 10*3/mm3      Neutrophil % 56.6 %      Lymphocyte % 29.0 %      Monocyte % 11.7 %      Eosinophil % 2.0 %      Basophil % 0.7 %      Neutrophils, Absolute 2.60 10*3/mm3      Lymphocytes, Absolute 1.30 10*3/mm3      Monocytes, Absolute 0.50 10*3/mm3      Eosinophils, Absolute 0.10 10*3/mm3      Basophils, Absolute 0.00 10*3/mm3      nRBC 0.1 /100 WBC     Troponin [[id number]]  (Normal) Collected: 08/03/22 1921    Specimen: Blood Updated: 08/1949     Troponin T <0.010 ng/mL     Narrative:      Troponin T Reference Range:  <= 0.03 ng/mL-   Negative for AMI  >0.03 ng/mL-     Abnormal for myocardial necrosis.  Clinicians would have to utilize clinical acumen, EKG, Troponin and serial changes to determine if it is an Acute Myocardial Infarction or myocardial injury due to an underlying chronic condition.       Results may be falsely decreased if patient taking Biotin.      COVID PRE-OP / PRE-PROCEDURE SCREENING ORDER (NO ISOLATION) - Swab, Nasopharynx [[id number]]  (Normal) Collected: 08/03/22 1735    Specimen: Swab from Nasopharynx Updated: 08/03/22 1807    Narrative:      The following orders were created for panel order COVID PRE-OP / PRE-PROCEDURE SCREENING ORDER (NO ISOLATION) - Swab, Nasopharynx.  Procedure                               Abnormality         Status                     ---------                               -----------         ------                     COVID-19,CEPHEID/ENRIQUE,CO...[[id number]]  Normal              Final result                 Please view results for these tests on the individual orders.    COVID-19,CEPHEID/ENRIQUE,COR/MARTHA/PAD/ROSI IN-HOUSE(OR EMERGENT/ADD-ON),NP SWAB IN TRANSPORT MEDIA 3-4 HR TAT, RT-PCR - Swab, Nasopharynx [[id number]]  (Normal) Collected: 08/03/22 1735    Specimen: Swab from Nasopharynx Updated: 08/03/22 1807     COVID19 Not Detected    Narrative:      Fact sheet for providers: https://www.fda.gov/media/847924/download     Fact sheet for patients:  https://www.fda.gov/media/522693/download  Fact sheet for providers: https://www.fda.gov/media/541488/download    Fact sheet for patients: https://www.fda.gov/media/073086/download    Test performed by PCR.    Urinalysis With Culture If Indicated - Urine, Clean Catch [[id number]]  (Normal) Collected: 08/03/22 1442    Specimen: Urine, Clean Catch Updated: 08/03/22 1449     Color, UA Yellow     Appearance, UA Clear     pH, UA 7.5     Specific Gravity, UA 1.012     Glucose, UA Negative     Ketones, UA Negative     Bilirubin, UA Negative     Blood, UA Negative     Protein, UA Negative     Leuk Esterase, UA Negative     Nitrite, UA Negative     Urobilinogen, UA 1.0 E.U./dL    Narrative:      In absence of clinical symptoms, the presence of pyuria, bacteria, and/or nitrites on the urinalysis result does not correlate with infection.  Urine microscopic not indicated.    Comprehensive Metabolic Panel [[id number]]  (Abnormal) Collected: 08/03/22 1221    Specimen: Blood Updated: 08/03/22 1246     Glucose 196 mg/dL      BUN 16 mg/dL      Creatinine 0.83 mg/dL      Sodium 136 mmol/L      Potassium 4.4 mmol/L      Chloride 102 mmol/L      CO2 25.0 mmol/L      Calcium 8.7 mg/dL      Total Protein 5.9 g/dL      Albumin 4.10 g/dL      ALT (SGPT) 9 U/L      AST (SGOT) 11 U/L      Alkaline Phosphatase 46 U/L      Total Bilirubin 0.6 mg/dL      Globulin 1.8 gm/dL      A/G Ratio 2.3 g/dL      BUN/Creatinine Ratio 19.3     Anion Gap 9.0 mmol/L      eGFR 93.0 mL/min/1.73      Comment: National Kidney Foundation and American Society of Nephrology (ASN) Task Force recommended calculation based on the Chronic Kidney Disease Epidemiology Collaboration (CKD-EPI) equation refit without adjustment for race.       Narrative:      GFR Normal >60  Chronic Kidney Disease <60  Kidney Failure <15      Troponin [[id number]]  (Normal) Collected: 08/03/22 1221    Specimen: Blood Updated: 08/03/22 1246     Troponin T <0.010 ng/mL     Narrative:       Troponin T Reference Range:  <= 0.03 ng/mL-   Negative for AMI  >0.03 ng/mL-     Abnormal for myocardial necrosis.  Clinicians would have to utilize clinical acumen, EKG, Troponin and serial changes to determine if it is an Acute Myocardial Infarction or myocardial injury due to an underlying chronic condition.       Results may be falsely decreased if patient taking Biotin.      Magnesium [[id number]]  (Normal) Collected: 08/03/22 1221    Specimen: Blood Updated: 08/03/22 1246     Magnesium 1.7 mg/dL     CBC & Differential [[id number]]  (Abnormal) Collected: 08/03/22 1221    Specimen: Blood Updated: 08/03/22 1227    Narrative:      The following orders were created for panel order CBC & Differential.  Procedure                               Abnormality         Status                     ---------                               -----------         ------                     CBC Auto Differential[[id number]]        Abnormal            Final result                 Please view results for these tests on the individual orders.    CBC Auto Differential [[id number]]  (Abnormal) Collected: 08/03/22 1221    Specimen: Blood Updated: 08/03/22 1227     WBC 4.90 10*3/mm3      RBC 3.78 10*6/mm3      Hemoglobin 12.6 g/dL      Hematocrit 36.7 %      MCV 96.9 fL      MCH 33.2 pg      MCHC 34.3 g/dL      RDW 13.4 %      RDW-SD 45.5 fl      MPV 7.0 fL      Platelets 171 10*3/mm3      Neutrophil % 67.5 %      Lymphocyte % 22.3 %      Monocyte % 9.0 %      Eosinophil % 0.5 %      Basophil % 0.7 %      Neutrophils, Absolute 3.30 10*3/mm3      Lymphocytes, Absolute 1.10 10*3/mm3      Monocytes, Absolute 0.40 10*3/mm3      Eosinophils, Absolute 0.00 10*3/mm3      Basophils, Absolute 0.00 10*3/mm3      nRBC 0.0 /100 WBC           Imaging Results (Most Recent)     Procedure Component Value Units Date/Time    XR Chest 1 View [[id number]] Collected: 08/03/22 1225     Updated: 08/03/22 1228    Narrative:      DATE OF EXAM:  8/3/2022 12:19 PM      PROCEDURE:  XR CHEST 1 VW-     INDICATIONS:  Chest pain       COMPARISON:  PA and lateral chest 2/21/2020     TECHNIQUE:   Single radiographic view of the chest was obtained.     FINDINGS:  No acute airspace disease. Benign calcified nodules are scattered in  both lungs, and are unchanged. Heart size is borderline enlarged but  stable with median sternotomy and CABG changes. Pulmonary vascular  distribution is normal. There is no pleural effusion or pneumothorax or  acute osseous abnormalities.       Impression:      No acute chest findings. No significant change from 2/21/2020     Electronically Signed By-Amisha Sanchez MD On:8/3/2022 12:26 PM  This report was finalized on [id number] by  Amisha aSnchez MD.        reviewed    ECG/EMG Results (most recent)     Procedure Component Value Units Date/Time    ECG 12 Lead [[id number]] Collected: 08/03/22 1035     Updated: 08/03/22 1036     QT Interval 492 ms     Narrative:      HEART RATE= 47  bpm  RR Interval= 1268  ms  UT Interval= 197  ms  P Horizontal Axis= 19  deg  P Front Axis= 40  deg  QRSD Interval= 169  ms  QT Interval= 492  ms  QRS Axis= 15  deg  T Wave Axis= 148  deg  - ABNORMAL ECG -  Sinus bradycardia  Left bundle branch block  Electronically Signed By:   Date and Time of Study: 2022-08-03 10:35:41    SCANNED - TELEMETRY   [[id number]] Resulted: 08/03/22     Updated: 08/04/22 1112    SCANNED - TELEMETRY   [[id number]] Resulted: 08/03/22     Updated: 08/04/22 1123    Adult Transthoracic Echo Complete W/ Cont if Necessary Per Protocol [[id number]] Resulted: 08/04/22 1124     Updated: 08/04/22 1129     Target HR (85%) 126 bpm      Max. Pred. HR (100%) 148 bpm      ACS 2.25 cm      Ao root diam 3.7 cm      Ao pk ancelmo 119.2 cm/sec      Ao V2 VTI 25.2 cm      NEEL(I,D) 3.3 cm2      EDV(cubed) 106.6 ml      EDV(MOD-sp4) 100.0 ml      EF(MOD-sp4) 55.4 %      ESV(cubed) 18.9 ml      ESV(MOD-sp4) 44.6 ml      IVS/LVPW 1.36 cm      LV mass(C)d 173.3 grams      LV V1 max  PG 4.1 mmHg      LV V1 mean PG 2.11 mmHg      LV V1 max 100.9 cm/sec      LVPWd 0.87 cm      MR max .5 mmHg      MV dec slope 334.5 cm/sec2      MV dec time 0.23 msec      MV V2 VTI 34.4 cm      MVA(VTI) 2.39 cm2      PA acc time 0.10 sec      PA pr(Accel) 33.8 mmHg      PA V2 max 100.6 cm/sec      PI end-d catracho 110.6 cm/sec      Pulm A Revs Catracho 27.2 cm/sec      RAP systole 3.0 mmHg      RV V1 max PG 2.31 mmHg      RV V1 max 75.9 cm/sec      RV V1 VTI 17.0 cm      RVIDd 3.1 cm      RVSP(TR) 34.3 mmHg      SI(MOD-sp4) 28.8 ml/m2      SV(LVOT) 82.0 ml      SV(MOD-sp4) 55.4 ml      SV(RVOT) 105.0 ml      TR max PG 31.3 mmHg      Ao max PG 5.7 mmHg      Ao mean PG 3.1 mmHg      FS 43.8 %      IVSd 1.19 cm      LA dimension (2D)  5.0 cm      LV V1 VTI 24.1 cm      LVIDd 4.7 cm      LVIDs 2.7 cm      LVOT area 3.4 cm2      LVOT diam 2.08 cm      MV E/A 0.94     MV max PG 4.3 mmHg      MV mean PG 1.54 mmHg      Pulm S/D 1.01     Qp/Qs 1.28     RVOT diam 2.8 cm      MR max catracho 575.5 cm/sec      MV A max catracho 83.7 cm/sec      MV E max catracho 78.3 cm/sec      Pulm A Revs Dur 0.09 sec      Pulm Rhoades Catracho 55.0 cm/sec      Pulm Sys Catracho 55.3 cm/sec      TR max catracho 279.9 cm/sec      LV Rhoades Vol (BSA corrected) 52.0 cm2      LV Sys Vol (BSA corrected) 23.2 cm2      Echo EF Estimated 60 %     Narrative:      · Left ventricular wall thickness is consistent with mild concentric   hypertrophy.  · Estimated left ventricular EF = 60% Left ventricular systolic function   is normal.  · There is calcification of the aortic valve mainly affecting the left   coronary cusp(s).  · Left ventricular diastolic function is consistent with (grade I)   impaired relaxation.  · Mild dilation of the aortic root is present.  · Estimated right ventricular systolic pressure from tricuspid   regurgitation is normal (<35 mmHg).           reviewed    Results for orders placed during the hospital encounter of 03/02/21    Duplex Carotid Ultrasound  CAR    Interpretation Summary  · Mid right internal carotid artery mild stenosis.  · Mid left internal carotid artery mild stenosis.  · Study Impression: • Right ICA Mid: Imaging indicates 16%-49% stenosis. • Left ICA Mid: Imaging indicates 16-49% stenosis.      Results for orders placed during the hospital encounter of 08/03/22    Adult Transthoracic Echo Complete W/ Cont if Necessary Per Protocol    Interpretation Summary  · Left ventricular wall thickness is consistent with mild concentric hypertrophy.  · Estimated left ventricular EF = 60% Left ventricular systolic function is normal.  · There is calcification of the aortic valve mainly affecting the left coronary cusp(s).  · Left ventricular diastolic function is consistent with (grade I) impaired relaxation.  · Mild dilation of the aortic root is present.  · Estimated right ventricular systolic pressure from tricuspid regurgitation is normal (<35 mmHg).      Microbiology Results (last 10 days)     Procedure Component Value - Date/Time    COVID PRE-OP / PRE-PROCEDURE SCREENING ORDER (NO ISOLATION) - Swab, Nasopharynx [[id number]]  (Normal) Collected: 08/03/22 1735    Lab Status: Final result Specimen: Swab from Nasopharynx Updated: 08/03/22 1807    Narrative:      The following orders were created for panel order COVID PRE-OP / PRE-PROCEDURE SCREENING ORDER (NO ISOLATION) - Swab, Nasopharynx.  Procedure                               Abnormality         Status                     ---------                               -----------         ------                     COVID-19,CEPHEID/ENRIQUE,CO...[[id number]]  Normal              Final result                 Please view results for these tests on the individual orders.    COVID-19,CEPHEID/ENRIQUE,COR/MARTHA/PAD/ROSI IN-HOUSE(OR EMERGENT/ADD-ON),NP SWAB IN TRANSPORT MEDIA 3-4 HR TAT, RT-PCR - Swab, Nasopharynx [[id number]]  (Normal) Collected: 08/03/22 1735    Lab Status: Final result Specimen: Swab from Nasopharynx Updated:  08/03/22 1807     COVID19 Not Detected    Narrative:      Fact sheet for providers: https://www.fda.gov/media/347953/download     Fact sheet for patients: https://www.fda.gov/media/282137/download  Fact sheet for providers: https://www.fda.gov/media/392562/download    Fact sheet for patients: https://www.fda.gov/media/968417/download    Test performed by PCR.          Assessment & Plan     Chest pain       Chest pain with history of CAD  -Initial troponin: Less than 0.00, trend  -Chest X-Ray: No acute findings  -EKG shows sinus bradycardia at 47 with a left bundle branch block, similar to previous study  -In the ED pt given 3 and 25 mg aspirin  -healthy heart diet for now, NPO at midnight  -Check lipid panel  -81mg ASA daily  -Cardiology consulted  -Stress test showed no evidence of significant reversible ischemia with perfusion study showing moderate sized severe intensity mostly fixed perfusion defect involving the anterior anteroapical wall and anteroseptal wall reported is likely secondary to attenuation artifact from left bundle branch block with an EF calculated 60% consistent with a low risk study  -Echocardiogram showed mild concentric hypertrophy with an EF of 60% and a grade 1 diastolic dysfunction  -Telemetry  -Continue aspirin, Plavix, statin, Imdur and Ranexa     Anemia  -Hemoglobin: 12.6 with a normal MCV and MCHC  -Monitor while admitted     Hypertension  -Well controlled with a most recent blood pressure of 104/62  -Hold metoprolol secondary to bradycardia  - Continue amlodipine, and lisinopril  - Monitor while admitted     Diabetes mellitus  -Moderately controlled with a most recent glucose of 196  -Hold metformin  -Basal and sliding scale  -Diabetic diet  -Monitor AC and HS     Hyperlipidemia  -Statin    I discussed the patients findings and my recommendations with patient and nursing staff.     Discharge Diagnosis:      Chest pain      Hospital Course  Patient is a 71 y.o. male presented with  fatigue and chest pain with bradycardia and in HPI noted above.  Serial troponins were assessed and found to be less than 0.010 with chest x-ray showing no acute finding.  EKG was obtained in the ED which showed sinus bradycardia at 47 with a left bundle branch block consistent with previous studies.  He was given 325 mg aspirin and continued on telemetry without significant events noted other than the bradycardia and previously reported left bundle branch block.  Cardiology was consulted who evaluated patient and recommended stress testing which was reported with a mostly fixed defect noted above and consistent with a low risk study.  Echocardiogram was performed which showed findings consistent with mild concentric hypertrophy and grade 1 diastolic dysfunction.Cardiology also recommended decreasing dose of beta-blocker and 30-day Holter monitor at discharge.  At this time patient felt to be in good condition for discharge with close follow-up with his PCP as well as cardiology on an outpatient basis.  His full testing/results and plan were discussed with patient along with concerning/alarm symptoms for which to call 911/return to the ED.  All questions were answered he verbalizes his understanding and agreement.    Past Medical History:     Past Medical History:   Diagnosis Date   • Atrial fibrillation (HCC)    • Coronary artery disease    • Diabetes mellitus (HCC)    • Gallstones    • Hyperlipidemia    • Hypertension    • Injury of back    • Low back pain    • Mitral regurgitation    • Neck pain    • Rheumatoid arthritis (HCC)    • Sleep apnea    • Valvular disease        Past Surgical History:     Past Surgical History:   Procedure Laterality Date   • BACK SURGERY  01/2019   • CARDIAC CATHETERIZATION  2006, 2009, 2012, 2018   • CHOLECYSTECTOMY  12/22/2015   • CORONARY ANGIOPLASTY  2006, 2009   • CORONARY ARTERY BYPASS GRAFT  05/11/2012    x2   • ROTATOR CUFF REPAIR Left        Social History:   Social History      Socioeconomic History   • Marital status:    Tobacco Use   • Smoking status: Never Smoker   • Smokeless tobacco: Never Used   Vaping Use   • Vaping Use: Never used   Substance and Sexual Activity   • Alcohol use: No   • Drug use: No   • Sexual activity: Defer       Procedures Performed         Consults:   Consults     Date and Time Order Name Status Description    8/3/2022  3:56 PM Inpatient Cardiology Consult Completed           Condition on Discharge:     Stable    Discharge Disposition      Discharge Medications     Discharge Medications      ASK your doctor about these medications      Instructions Start Date   amLODIPine 5 MG tablet  Commonly known as: NORVASC   Take 1 tablet by mouth once daily      aspirin 81 MG EC tablet   81 mg, Oral, Daily      atorvastatin 20 MG tablet  Commonly known as: LIPITOR   Take 1 tablet by mouth once daily      clopidogrel 75 MG tablet  Commonly known as: PLAVIX   Take 1 tablet by mouth once daily      isosorbide mononitrate 30 MG 24 hr tablet  Commonly known as: IMDUR   Take 1 tablet by mouth once daily      lisinopril 20 MG tablet  Commonly known as: PRINIVIL,ZESTRIL   Take 1 tablet by mouth twice daily      magnesium citrate 1.745 GM/30ML solution solution   See Admin Instructions      metFORMIN  MG 24 hr tablet  Commonly known as: GLUCOPHAGE-XR   TAKE 2 TABLETS BY MOUTH TWICE DAILY FOR 30 DAYS      metoprolol tartrate 50 MG tablet  Commonly known as: LOPRESSOR   Take 1 tablet by mouth twice daily      nitroglycerin 0.4 MG SL tablet  Commonly known as: NITROSTAT   0.4 mg, Sublingual, Every 5 Minutes PRN, Take no more than 3 doses in 15 minutes.       ranolazine 1000 MG 12 hr tablet  Commonly known as: RANEXA   TAKE 1  BY MOUTH EVERY 12 HOURS      tiZANidine 4 MG tablet  Commonly known as: ZANAFLEX   4 mg, Oral, Every 8 Hours PRN             Discharge Diet:     Activity at Discharge:     Follow-up Appointments  Future Appointments   Date Time Provider  Department Center   8/8/2022  9:30 AM Cris Mcneill MD [facility]   9/8/2022  9:00 AM Cris Mcneill MD [facility]   12/13/2022  9:40 AM Chloé Beltran MD [facility]         Test Results Pending at Discharge       Risk for Readmission (LACE) Score: 6 (8/4/2022  6:01 AM)          Reynaldo Parish PA-C  08/04/22  12:36 EDT

## 2022-08-05 LAB — QT INTERVAL: 492 MS

## 2022-08-05 NOTE — CASE MANAGEMENT/SOCIAL WORK
Case Management Discharge Note      Final Note: Routine home         Selected Continued Care - Discharged on 8/4/2022 Admission date: 8/3/2022 - Discharge disposition: Home or Self Care    Destination    No services have been selected for the patient.                      Transportation Services  Private: Car    Final Discharge Disposition Code: 01 - home or self-care

## 2022-08-19 RX ORDER — ISOSORBIDE MONONITRATE 30 MG/1
TABLET, EXTENDED RELEASE ORAL
Qty: 90 TABLET | Refills: 0 | Status: SHIPPED | OUTPATIENT
Start: 2022-08-19 | End: 2022-11-17

## 2022-09-06 RX ORDER — ATORVASTATIN CALCIUM 20 MG/1
TABLET, FILM COATED ORAL
Qty: 90 TABLET | Refills: 0 | Status: SHIPPED | OUTPATIENT
Start: 2022-09-06 | End: 2022-11-30

## 2022-09-06 RX ORDER — LISINOPRIL 20 MG/1
TABLET ORAL
Qty: 180 TABLET | Refills: 0 | Status: SHIPPED | OUTPATIENT
Start: 2022-09-06 | End: 2023-04-03

## 2022-09-08 ENCOUNTER — OFFICE VISIT (OUTPATIENT)
Dept: CARDIOLOGY | Facility: CLINIC | Age: 72
End: 2022-09-08

## 2022-09-08 VITALS
OXYGEN SATURATION: 97 % | BODY MASS INDEX: 27.94 KG/M2 | HEIGHT: 67 IN | SYSTOLIC BLOOD PRESSURE: 120 MMHG | HEART RATE: 50 BPM | RESPIRATION RATE: 18 BRPM | WEIGHT: 178 LBS | DIASTOLIC BLOOD PRESSURE: 66 MMHG

## 2022-09-08 DIAGNOSIS — E78.2 MIXED HYPERLIPIDEMIA: ICD-10-CM

## 2022-09-08 DIAGNOSIS — I34.0 NONRHEUMATIC MITRAL VALVE REGURGITATION: ICD-10-CM

## 2022-09-08 DIAGNOSIS — I20.9 ANGINA PECTORIS: ICD-10-CM

## 2022-09-08 DIAGNOSIS — I10 PRIMARY HYPERTENSION: ICD-10-CM

## 2022-09-08 DIAGNOSIS — Z95.1 S/P CABG X 4: ICD-10-CM

## 2022-09-08 DIAGNOSIS — I25.718 ATHEROSCLEROSIS OF AUTOLOGOUS VEIN CORONARY ARTERY BYPASS GRAFT(S) WITH OTHER FORMS OF ANGINA PECTORIS: ICD-10-CM

## 2022-09-08 DIAGNOSIS — I44.7 LEFT BUNDLE BRANCH BLOCK: ICD-10-CM

## 2022-09-08 DIAGNOSIS — I25.718 CORONARY ARTERY DISEASE OF AUTOLOGOUS VEIN BYPASS GRAFT WITH STABLE ANGINA PECTORIS: Primary | ICD-10-CM

## 2022-09-08 DIAGNOSIS — I42.0 DILATED CARDIOMYOPATHY: ICD-10-CM

## 2022-09-08 DIAGNOSIS — I48.0 PAROXYSMAL ATRIAL FIBRILLATION: ICD-10-CM

## 2022-09-08 DIAGNOSIS — I25.5 ISCHEMIC CARDIOMYOPATHY: ICD-10-CM

## 2022-09-08 LAB
Lab: 47
TOAL ENROLLMENT DAYS: 30

## 2022-09-08 PROCEDURE — 99214 OFFICE O/P EST MOD 30 MIN: CPT | Performed by: INTERNAL MEDICINE

## 2022-09-08 PROCEDURE — 93228 REMOTE 30 DAY ECG REV/REPORT: CPT | Performed by: INTERNAL MEDICINE

## 2022-09-08 RX ORDER — FLUOROURACIL 50 MG/G
CREAM TOPICAL
COMMUNITY
Start: 2022-08-31 | End: 2023-03-09

## 2022-09-08 NOTE — PROGRESS NOTES
Cardiology Office Visit      Encounter Date:  09/08/2022    Patient ID:   Kaveh Vences is a 71 y.o. male.      Reason For Followup:  Coronary artery disease  Hypertension  Hyperlipidemia      Brief Clinical History:  Dear Skinny Harper MD    I had the pleasure of seeing Kaveh Vences today. As you are well aware, this is a 71 y.o. male with history of diabetes,  coronary artery disease, status post previous CABG in 2012, and PCI stenting.     cardiac catheterization with graft angiography showed LV ejection fraction of 40%, severe native three-vessel coronary disease with 90% calcified stenosis involving the distal left main involving the ostium of the LAD significant 99% lesion of left circumflex coronary artery.  100% occlusion of the nondominant right coronary artery was noted as well.  [place] to the LAD was patent and saphenous vein graft to marginal branch was patent as well. Echocardiogram with LV ejection fraction of 45% with moderate mitral regurgitation      Interval History:  Denies any new complaints  No further chest pain  No unstable symptoms  Dizziness and shortness of breath have improved  No syncope  No significant change in the overall symptoms  Anginal symptoms are stable      Interpretation Summary    Extended Holter monitor for evaluation of palpitations  Patient was monitored from August 4, 2022 to September 5, 2022  Underlying rhythm is sinus rhythm with a minimal heart rate of 50 bpm and maximal heart rate of 120 bpm average heart rate of 63 bpm  No atrial fibrillation  No sustained ventricular or supraventricular tach arrhythmia  First-degree AV block  No clinically significant pauses  PVC burden of 5%  PAC burden of 1%  Relatively benign study  Clinical correlation is recommended    Interpretation Summary    · Left ventricular wall thickness is consistent with mild concentric hypertrophy.  · Estimated left ventricular EF = 60% Left ventricular systolic function is normal.  · There is  calcification of the aortic valve mainly affecting the left coronary cusp(s).  · Left ventricular diastolic function is consistent with (grade I) impaired relaxation.  · Mild dilation of the aortic root is present.  · Estimated right ventricular systolic pressure from tricuspid regurgitation is normal (<35 mmHg).    Interpretation Summary    · Myocardial perfusion study did not show any evidence of any significant reversible ischemia  · Myocardial perfusion study shows moderate size severe intensity mostly fixed perfusion defect involving the anterior anteroapical wall and anteroseptal wall likely secondary to underlying attenuation artifact from left bundle branch block  · Left ventricular ejection fraction is normal. (Calculated EF = 60%).  · Impressions are consistent with an intermediate risk study.  · Clinical correlation is recommended        Assessment & Plan    Impressions:  Coronary artery disease status post coronary artery bypass surgery  Hypertension  Hyperlipidemia  Sinus bradycardia with a left bundle branch block  Peripheral vascular disease/altered moderate obstructive carotid artery disease  Mild to moderate mitral regurgitation  Diabetes mellitus/most recent hemoglobin A1c was optimal  Postop atrial fibrillation currently maintaining sinus rhythm  Bilateral carotid stenosis in the moderate range  Intermittent asymptomatic bradycardia and also intermittent tachycardia  Stable angina  Mild bilateral carotid stenosis    Recommendations:  Recent hospitalization with extensive cardiac work-up with no significant pathology  Good functional capacity  Cardiogram discussed with the patient  Labs with the primary care physician office  Plan treat patient conservatively from cardiac standpoint  Continue current medical therapy  Patient is already on maximal medical therapy  Continue close monitoring  Results reviewed and discussed with patient  Labs reviewed and discussed with the patient  Labs with primary  "care physician office  Consider repeat carotid ultrasound next year  Bilateral carotid stenosis 16 to 49% range  Recent labs reviewed and discussed with patient and family  Continue current medical therapy continued aggressive risk factor modification    Follow-up in office 6 months    Objective:    Vitals:  Vitals:    09/08/22 0910   BP: 120/66   BP Location: Left arm   Patient Position: Sitting   Cuff Size: Large Adult   Pulse: 50   Resp: 18   SpO2: 97%   Weight: 80.7 kg (178 lb)   Height: 170.2 cm (67\")       Physical Exam:    General: Alert, cooperative, no distress, appears stated age  Head:  Normocephalic, atraumatic, mucous membranes moist  Eyes:  Conjunctiva/corneas clear, EOM's intact     Neck:  Supple,  no adenopathy;      Lungs: Clear to auscultation bilaterally, no wheezes rhonchi rales are noted  Chest wall: No tenderness  Heart::  Regular rate and rhythm, S1 and S2 normal, no murmur, rub or gallop  Abdomen: Soft, non-tender, nondistended bowel sounds active  Extremities: No cyanosis, clubbing, or edema  Pulses: 2+ and symmetric all extremities  Skin:  No rashes or lesions  Neuro/psych: A&O x3. CN II through XII are grossly intact with appropriate affect      Allergies:  No Known Allergies    Medication Review:     Current Outpatient Medications:   •  amLODIPine (NORVASC) 5 MG tablet, Take 1 tablet by mouth once daily, Disp: 90 tablet, Rfl: 0  •  aspirin 81 MG EC tablet, Take 81 mg by mouth Daily., Disp: , Rfl:   •  atorvastatin (LIPITOR) 20 MG tablet, Take 1 tablet by mouth once daily, Disp: 90 tablet, Rfl: 0  •  clopidogrel (PLAVIX) 75 MG tablet, Take 1 tablet by mouth once daily, Disp: 90 tablet, Rfl: 2  •  fluorouracil (EFUDEX) 5 % cream, APPLY THIN LAYER TWICE DAILY TO THE AFFECTED AREA(S) ON THE CHEEKS FOR TWO WEEKS. WILL GET RED AND IRRITATED. WASH HANDS AFTER APPLYING, Disp: , Rfl:   •  isosorbide mononitrate (IMDUR) 30 MG 24 hr tablet, Take 1 tablet by mouth once daily, Disp: 90 tablet, Rfl: " 0  •  lisinopril (PRINIVIL,ZESTRIL) 20 MG tablet, Take 1 tablet by mouth twice daily, Disp: 180 tablet, Rfl: 0  •  magnesium citrate 1.745 GM/30ML solution solution, See Admin Instructions., Disp: , Rfl:   •  metFORMIN ER (GLUCOPHAGE-XR) 500 MG 24 hr tablet, TAKE 2 TABLETS BY MOUTH TWICE DAILY FOR 30 DAYS, Disp: , Rfl:   •  nitroglycerin (NITROSTAT) 0.4 MG SL tablet, Place 1 tablet under the tongue Every 5 (Five) Minutes As Needed for Chest Pain. Take no more than 3 doses in 15 minutes., Disp: 30 tablet, Rfl: 4  •  ranolazine (RANEXA) 1000 MG 12 hr tablet, TAKE 1  BY MOUTH EVERY 12 HOURS, Disp: 180 tablet, Rfl: 0  •  tiZANidine (ZANAFLEX) 4 MG tablet, Take 1 tablet by mouth Every 8 (Eight) Hours As Needed for Muscle Spasms., Disp: 90 tablet, Rfl: 5  •  metoprolol tartrate (LOPRESSOR) 25 MG tablet, Take 1 tablet by mouth 2 (Two) Times a Day for 30 days., Disp: 60 tablet, Rfl: 0    Family History:  Family History   Problem Relation Age of Onset   • Coronary artery disease Father    • Coronary artery disease Brother    • Heart attack Brother    • Stroke Brother    • Diabetes Maternal Uncle        Past Medical History:  Past Medical History:   Diagnosis Date   • Atrial fibrillation (HCC)    • Coronary artery disease    • Diabetes mellitus (HCC)    • Gallstones    • Hyperlipidemia    • Hypertension    • Injury of back    • Low back pain    • Mitral regurgitation    • Neck pain    • Rheumatoid arthritis (HCC)    • Sleep apnea    • Valvular disease        Past surgical History:  Past Surgical History:   Procedure Laterality Date   • BACK SURGERY  01/2019   • CARDIAC CATHETERIZATION  2006, 2009, 2012, 2018   • CHOLECYSTECTOMY  12/22/2015   • CORONARY ANGIOPLASTY  2006, 2009   • CORONARY ARTERY BYPASS GRAFT  05/11/2012    x2   • ROTATOR CUFF REPAIR Left        Social History:  Social History     Socioeconomic History   • Marital status:    Tobacco Use   • Smoking status: Never Smoker   • Smokeless tobacco: Never Used    Vaping Use   • Vaping Use: Never used   Substance and Sexual Activity   • Alcohol use: No   • Drug use: No   • Sexual activity: Defer       Review of Systems:  The following systems were reviewed as they relate to the cardiovascular system: Constitutional, Eyes, ENT, Cardiovascular, Respiratory, Gastrointestinal, Integumentary, Neurological, Psychiatric, Hematologic, Endocrine, Musculoskeletal, and Genitourinary. The pertinent cardiovascular findings are reported above with all other cardiovascular points within those systems being negative.    Diagnostic Study Review:     Current Electrocardiogram:  Procedures      NOTE: The following portions of the patient's history were reviewed and updated this visit as appropriate: allergies, current medications, past family history, past medical history, past social history, past surgical history and problem list.

## 2022-09-13 NOTE — TELEPHONE ENCOUNTER
Caller: La Vences    Relationship: Emergency Contact    Best call back number: [phone]    Requested Prescriptions:   Requested Prescriptions     Pending Prescriptions Disp Refills   • metoprolol tartrate (LOPRESSOR) 25 MG tablet 60 tablet 0     Sig: Take 1 tablet by mouth 2 (Two) Times a Day for 30 days.        Pharmacy where request should be sent: [facility] [address] [place] [address] [phone] [facility] [phone]      Additional details provided by patient: PATIENT RECENTLY SWITCHED FROM 50MG TO 25MG PER DAY, PATIENT IS COMPLETELY OUT OF METOPROLOL.     Does the patient have less than a 3 day supply:  [x] Yes  [] No    Elvin Peoples Rep   09/13/22 11:46 EDT

## 2022-10-11 RX ORDER — AMLODIPINE BESYLATE 5 MG/1
TABLET ORAL
Qty: 90 TABLET | Refills: 0 | Status: SHIPPED | OUTPATIENT
Start: 2022-10-11 | End: 2023-01-16

## 2022-10-17 RX ORDER — RANOLAZINE 1000 MG/1
TABLET, EXTENDED RELEASE ORAL
Qty: 180 TABLET | Refills: 0 | Status: SHIPPED | OUTPATIENT
Start: 2022-10-17 | End: 2023-01-16

## 2022-11-17 RX ORDER — ISOSORBIDE MONONITRATE 30 MG/1
TABLET, EXTENDED RELEASE ORAL
Qty: 90 TABLET | Refills: 0 | Status: SHIPPED | OUTPATIENT
Start: 2022-11-17 | End: 2023-01-27

## 2022-11-30 RX ORDER — ATORVASTATIN CALCIUM 20 MG/1
TABLET, FILM COATED ORAL
Qty: 90 TABLET | Refills: 0 | Status: SHIPPED | OUTPATIENT
Start: 2022-11-30 | End: 2023-03-13

## 2022-12-13 ENCOUNTER — OFFICE VISIT (OUTPATIENT)
Dept: PAIN MEDICINE | Facility: CLINIC | Age: 72
End: 2022-12-13

## 2022-12-13 VITALS
RESPIRATION RATE: 16 BRPM | OXYGEN SATURATION: 97 % | HEART RATE: 54 BPM | DIASTOLIC BLOOD PRESSURE: 68 MMHG | SYSTOLIC BLOOD PRESSURE: 125 MMHG

## 2022-12-13 DIAGNOSIS — M54.12 CERVICAL RADICULITIS: ICD-10-CM

## 2022-12-13 DIAGNOSIS — M96.1 POSTLAMINECTOMY SYNDROME OF LUMBAR REGION: ICD-10-CM

## 2022-12-13 DIAGNOSIS — M47.817 LUMBOSACRAL SPONDYLOSIS WITHOUT MYELOPATHY: ICD-10-CM

## 2022-12-13 DIAGNOSIS — M47.812 CERVICAL SPONDYLOSIS WITHOUT MYELOPATHY: ICD-10-CM

## 2022-12-13 DIAGNOSIS — G89.4 CHRONIC PAIN SYNDROME: Primary | ICD-10-CM

## 2022-12-13 PROCEDURE — 99214 OFFICE O/P EST MOD 30 MIN: CPT | Performed by: ANESTHESIOLOGY

## 2022-12-13 RX ORDER — TIZANIDINE 4 MG/1
4 TABLET ORAL EVERY 8 HOURS PRN
Qty: 90 TABLET | Refills: 5 | Status: SHIPPED | OUTPATIENT
Start: 2022-12-13

## 2022-12-13 NOTE — PROGRESS NOTES
Subjective    CC Back pain  Kaveh Vences is a 71 y.o. male with chronic back pain history of decompression/L2-3 microdiscectomy in 2019/Dr. Brandon, here for f/u.  Complains of worsening neck pain and bilateral upper extremity radicular pain.  Had 60 to 70% relief with cervical CLARENCE a year ago.  Now symptoms have returned and interfering with sleep and work.  Still doing well with axial back pain since RFA.  Chronic axial back pain radiating to bilateral hip without radicular pain worse with standing walking or prolonged activity.  Denies weakness, saddle anesthesia bladder bowel continence.  Chronic neck pain radiating to head and bilateral shoulders worse at night and associated with muscle spasm headaches.  Denies weakness, balance issues, bladder bowel continence.    Tried physical therapy and medication with marginal relief.  Interfering with ADL and sleep.   Seen neurosurgery, no surgery recommended.    C-spine MRI  Multilevel degenerative disc disease most notably at C5-C6 and C6-C7 with mild spinal canal stenosis. Moderate right-sided neuroforaminal narrowing at C6-C7 with mild left-sided neuroforaminal narrowing.  L-spine x-ray  Good range of motion with flexion and extension.  No evidence of subluxation. Persistent degenerative disc space narrowing at the L3/4 and L5/R2dhlmxx, unchanged from prior study.   L-spine MRI 2018 degenerative disc disease and mild facet degenerative change. Findings are most pronounced at L2-L3 were is moderate to severe neural foramen narrowing on the left due to the large focal disc extrusion.  mild neural foramen narrowing on the right at the L2-L3 level due to a smaller focal extrusion    Pain Assessment   Location of Pain: Lower Back  Description of Pain: Dull/Aching, Throbbing, Stabbing  Previous Pain Rating :5  Current Pain Ratin  Aggravating Factors: Activity  Alleviating Factors: Rest, Medication    PEG Assessment   What number best describes your pain  on average in the past week?1  What number best describes how, during the past week, pain has interfered with your enjoyment of life?0  What number best describes how, during the past week, pain has interfered with your general activity?8    The following portions of the patient's history were reviewed and updated as appropriate: allergies, current medications, past family history, past medical history, past social history, past surgical history and problem list.     has a past medical history of Atrial fibrillation (HCC), Coronary artery disease, Diabetes mellitus (HCC), Gallstones, Hyperlipidemia, Hypertension, Injury of back, Low back pain, Mitral regurgitation, Neck pain, Rheumatoid arthritis (HCC), Sleep apnea, and Valvular disease.   has a past surgical history that includes Coronary artery bypass graft (05/11/2012); Coronary angioplasty (2006, 2009); Rotator cuff repair (Left); Cholecystectomy (12/22/2015); Cardiac catheterization (2006, 2009, 2012, 2018); and Back surgery (01/2019).  family history includes Coronary artery disease in his brother and father; Diabetes in his maternal uncle; Heart attack in his brother; Stroke in his brother.  Social History     Tobacco Use   • Smoking status: Never   • Smokeless tobacco: Never   Substance Use Topics   • Alcohol use: No       Review of Systems   Musculoskeletal: Positive for arthralgias, back pain, myalgias, neck pain and neck stiffness.   All other systems reviewed and are negative.      Objective   Physical Exam   Constitutional: No distress.   Pulmonary/Chest: Effort normal.   Musculoskeletal:      Cervical back: He exhibits decreased range of motion and tenderness.      Lumbar back: He exhibits tenderness.   Vitals reviewed.    /68   Pulse 54   Resp 16   SpO2 97%      PHQ 9 on chart  Opioid risk tool low risk    Assessment & Plan   Diagnoses and all orders for this visit:    1. Chronic pain syndrome (Primary)  -     tiZANidine (ZANAFLEX) 4 MG  tablet; Take 1 tablet by mouth Every 8 (Eight) Hours As Needed for Muscle Spasms.  Dispense: 90 tablet; Refill: 5    2. Cervical spondylosis without myelopathy    3. Cervical radiculitis  -     Epidural Block    4. Lumbosacral spondylosis without myelopathy    5. Postlaminectomy syndrome of lumbar region    Summary  Kaveh Vences is a 71 y.o. male with chronic back pain history of decompression/L2-3 microdiscectomy in January 2019/Dr. Bradnon, chronic neck pain here for f/u.   Chronic neck pain from DDD spondylosis.  Chronic back pain from postlaminectomy syndrome and chronic polyarthralgia.  Repeat lumbar RFA or cervical CLARENCE as needed.    Complains of worsening neck pain and bilateral upper extremity radicular pain.  Had 60 to 70% relief with cervical CLARENCE a year ago.  Now symptoms have returned and interfering with sleep and work.  Still doing well with axial back pain since RFA.    Scheduled for repeat cervical CLARENCE.  Risk and benefits discussed.  Needs to be off Plavix 7 days prior to procedure.    Continue  Zanaflex at as needed bedtime.    RTC 6 months or as needed for procedure

## 2023-01-16 RX ORDER — RANOLAZINE 1000 MG/1
TABLET, EXTENDED RELEASE ORAL
Qty: 180 TABLET | Refills: 0 | Status: SHIPPED | OUTPATIENT
Start: 2023-01-16

## 2023-01-16 RX ORDER — AMLODIPINE BESYLATE 5 MG/1
TABLET ORAL
Qty: 90 TABLET | Refills: 0 | Status: SHIPPED | OUTPATIENT
Start: 2023-01-16

## 2023-01-20 ENCOUNTER — HOSPITAL ENCOUNTER (OUTPATIENT)
Dept: PAIN MEDICINE | Facility: HOSPITAL | Age: 73
Discharge: HOME | End: 2023-01-20
Payer: MEDICARE

## 2023-01-20 VITALS
TEMPERATURE: 97.5 F | OXYGEN SATURATION: 96 % | WEIGHT: 195 LBS | DIASTOLIC BLOOD PRESSURE: 69 MMHG | RESPIRATION RATE: 16 BRPM | HEART RATE: 53 BPM | HEIGHT: 67 IN | BODY MASS INDEX: 30.61 KG/M2 | SYSTOLIC BLOOD PRESSURE: 112 MMHG

## 2023-01-20 DIAGNOSIS — R52 PAIN: ICD-10-CM

## 2023-01-20 DIAGNOSIS — M54.12 CERVICAL RADICULITIS: Primary | ICD-10-CM

## 2023-01-20 PROCEDURE — 77003 FLUOROGUIDE FOR SPINE INJECT: CPT

## 2023-01-20 PROCEDURE — 25010000002 METHYLPREDNISOLONE PER 40 MG: Performed by: ANESTHESIOLOGY

## 2023-01-20 PROCEDURE — 62321 NJX INTERLAMINAR CRV/THRC: CPT | Performed by: ANESTHESIOLOGY

## 2023-01-20 PROCEDURE — 0 IOPAMIDOL 41 % SOLUTION: Performed by: ANESTHESIOLOGY

## 2023-01-20 RX ORDER — METHYLPREDNISOLONE ACETATE 40 MG/ML
40 INJECTION, SUSPENSION INTRA-ARTICULAR; INTRALESIONAL; INTRAMUSCULAR; SOFT TISSUE ONCE
Status: COMPLETED | OUTPATIENT
Start: 2023-01-20 | End: 2023-01-20

## 2023-01-20 RX ADMIN — METHYLPREDNISOLONE ACETATE 40 MG: 40 INJECTION, SUSPENSION INTRA-ARTICULAR; INTRALESIONAL; INTRAMUSCULAR; INTRASYNOVIAL; SOFT TISSUE at 11:22

## 2023-01-20 RX ADMIN — IOPAMIDOL 3 ML: 408 INJECTION, SOLUTION INTRATHECAL at 11:22

## 2023-01-20 NOTE — PROCEDURES
"Subjective    CC neck pain  Kaveh Vences is a 72 y.o. male cervical radiculitis here for repeat cervical CLARENCE.  Off Plavix 7 days.     Pain Assessment   Location of Pain: Lower Back, neck pain, joint  Description of Pain: Dull/Aching, Throbbing, Stabbing  Previous Pain Rating :7  Current Pain Ratin  Aggravating Factors: Activity  Alleviating Factors: Rest, Medication    The following portions of the patient's history were reviewed and updated as appropriate: allergies, current medications, past family history, past medical history, past social history, past surgical history and problem list.      Review of Systems  As in HPI  Objective   Physical Exam  Constitutional:       General: He is not in acute distress.  Cardiovascular:      Rate and Rhythm: Normal rate.   Pulmonary:      Effort: Pulmonary effort is normal.   Musculoskeletal:      Cervical back: Tenderness present.      Lumbar back: Tenderness present. Decreased range of motion.   Neurological:      Mental Status: He is alert and oriented to person, place, and time.       /60 (BP Location: Left arm, Patient Position: Sitting)   Pulse 52   Temp 97.5 °F (36.4 °C) (Skin)   Resp 16   Ht 170.2 cm (67\")   Wt 88.5 kg (195 lb)   SpO2 97%   BMI 30.54 kg/m²     Assessment & Plan    underwent repeat cervical CLARENCE    RTC 4-6 weeks or as needed for repeat    DATE OF PROCEDURE:  2023    PREOPERATIVE DIAGNOSIS:Cervical radiculitis    POSTOPERATIVE DIAGNOSIS: same    PROCEDURE PERFORMED:  cervical Epidural Steroid Injection    The patient presents with a history of   cervical degenerative disc disease  with  radiculitis. The patient presents today for a  cervical  epidural steroid injection at level C6/7. The patient understands the risks and benefits of the procedure and wishes to proceed.  The patient was seen in the preoperative area.  Patient's consent was obtained and updated.  Vitals were taken.  Patient was then brought to the procedure suite and " placed in a prone position. The appropriate anatomic area was widely prepped with Chloraprep and draped in a sterile fashion. Noninvasive monitoring per routine anesthesia protocol was placed.  Under fluoroscopic guidance using  AP view a 20 gauge styleted tuohy needle was passed through skin anesthetized with 1% Lidocaine without epinephrine.  The needle was advanced using the continuous loss of resistance to saline technique into the C6 epidural space. Needle tip placement in the epidural space was confirmed by loss of resistance and injection of 1 mL of  preservative free contrast.  Following this 7 mL of a solution containing  1 mL of 40 mg Depo-Medrol and 7 mL of preservative-free saline was carefully administered in the epidural space. Epidurogram following injection demonstrated dye spread as high as C4 and as low as T1. A sterile dressing was placed over the puncture site.    The patient tolerated the procedure with no complications . They were then brought to the post procedure area where they recovered nicely.

## 2023-01-20 NOTE — DISCHARGE INSTRUCTIONS

## 2023-01-27 RX ORDER — ISOSORBIDE MONONITRATE 30 MG/1
TABLET, EXTENDED RELEASE ORAL
Qty: 90 TABLET | Refills: 0 | Status: SHIPPED | OUTPATIENT
Start: 2023-01-27

## 2023-03-01 ENCOUNTER — OFFICE VISIT (OUTPATIENT)
Dept: PAIN MEDICINE | Facility: CLINIC | Age: 73
End: 2023-03-01
Payer: MEDICARE

## 2023-03-01 VITALS
OXYGEN SATURATION: 96 % | DIASTOLIC BLOOD PRESSURE: 69 MMHG | RESPIRATION RATE: 16 BRPM | HEART RATE: 51 BPM | SYSTOLIC BLOOD PRESSURE: 114 MMHG

## 2023-03-01 DIAGNOSIS — G89.4 CHRONIC PAIN SYNDROME: Primary | ICD-10-CM

## 2023-03-01 DIAGNOSIS — M47.817 LUMBOSACRAL SPONDYLOSIS WITHOUT MYELOPATHY: ICD-10-CM

## 2023-03-01 DIAGNOSIS — M25.50 POLYARTHRALGIA: ICD-10-CM

## 2023-03-01 DIAGNOSIS — M47.812 CERVICAL SPONDYLOSIS WITHOUT MYELOPATHY: ICD-10-CM

## 2023-03-01 DIAGNOSIS — M96.1 POSTLAMINECTOMY SYNDROME OF LUMBAR REGION: ICD-10-CM

## 2023-03-01 PROCEDURE — 99214 OFFICE O/P EST MOD 30 MIN: CPT | Performed by: ANESTHESIOLOGY

## 2023-03-01 NOTE — PROGRESS NOTES
Subjective    CC Back pain  Kaveh Vences is a 72 y.o. male with chronic back pain history of decompression/L2-3 microdiscectomy in 2019/Dr. Brandon, here for f/u.  Cervical CLARENCE last visit reports 70% relief with functional benefits.  Today complains of worsening axial back pain significantly impairing ADL.  Had 70% relief with lumbar RFA lasted 8 to 10 months.  Now symptoms have returned.  Chronic axial back pain radiating to bilateral hip without radicular pain worse with standing walking or prolonged activity.  Denies weakness, saddle anesthesia bladder bowel continence.  Chronic neck pain radiating to head and bilateral shoulders worse at night and associated with muscle spasm headaches.  Denies weakness, balance issues, bladder bowel continence.    Tried physical therapy and medication with marginal relief.  Interfering with ADL and sleep.   Seen neurosurgery, no surgery recommended.    C-spine MRI  Multilevel degenerative disc disease most notably at C5-C6 and C6-C7 with mild spinal canal stenosis. Moderate right-sided neuroforaminal narrowing at C6-C7 with mild left-sided neuroforaminal narrowing.  L-spine x-ray  Good range of motion with flexion and extension.  No evidence of subluxation. Persistent degenerative disc space narrowing at the L3/4 and L5/G7hhvjts, unchanged from prior study.   L-spine MRI  degenerative disc disease and mild facet degenerative change. Findings are most pronounced at L2-L3 were is moderate to severe neural foramen narrowing on the left due to the large focal disc extrusion.  mild neural foramen narrowing on the right at the L2-L3 level due to a smaller focal extrusion    Pain Assessment   Location of Pain: Lower Back  Description of Pain: Dull/Aching, Throbbing, Stabbing  Previous Pain Rating :2  Current Pain Ratin  Aggravating Factors: Activity  Alleviating Factors: Rest, Medication    PEG Assessment   What number best describes your pain on average in the  past week?1  What number best describes how, during the past week, pain has interfered with your enjoyment of life?0  What number best describes how, during the past week, pain has interfered with your general activity?7    The following portions of the patient's history were reviewed and updated as appropriate: allergies, current medications, past family history, past medical history, past social history, past surgical history and problem list.     has a past medical history of Atrial fibrillation (HCC), Coronary artery disease, Diabetes mellitus (HCC), Gallstones, Hyperlipidemia, Hypertension, Injury of back, Low back pain, Mitral regurgitation, Neck pain, Rheumatoid arthritis (HCC), Sleep apnea, and Valvular disease.   has a past surgical history that includes Coronary artery bypass graft (05/11/2012); Coronary angioplasty (2006, 2009); Rotator cuff repair (Left); Cholecystectomy (12/22/2015); Cardiac catheterization (2006, 2009, 2012, 2018); and Back surgery (01/2019).  family history includes Coronary artery disease in his brother and father; Diabetes in his maternal uncle; Heart attack in his brother; Stroke in his brother.  Social History     Tobacco Use   • Smoking status: Never   • Smokeless tobacco: Never   Substance Use Topics   • Alcohol use: No       Review of Systems   Musculoskeletal: Positive for arthralgias, back pain, myalgias, neck pain and neck stiffness.   All other systems reviewed and are negative.      Objective   Physical Exam   Constitutional: No distress.   Pulmonary/Chest: Effort normal.   Musculoskeletal:      Cervical back: He exhibits decreased range of motion and tenderness.      Lumbar back: He exhibits tenderness.   Vitals reviewed.    /69   Pulse 51   Resp 16   SpO2 96%      PHQ 9 on chart  Opioid risk tool low risk    Assessment & Plan   Diagnoses and all orders for this visit:    1. Chronic pain syndrome (Primary)    2. Cervical spondylosis without myelopathy    3.  Lumbosacral spondylosis without myelopathy  -     Facet  -     Facet    4. Postlaminectomy syndrome of lumbar region    5. Polyarthralgia    Summary  Kaveh Vences is a 72 y.o. male with chronic back pain history of decompression/L2-3 microdiscectomy in January 2019/Dr. Brandon, chronic neck pain here for f/u.   Chronic neck pain from DDD spondylosis.  Chronic back pain from postlaminectomy syndrome and chronic polyarthralgia.  Repeat lumbar RFA or cervical CLARENCE as needed.    Cervical CLARENCE last visit reports 70% relief with functional benefits.  Today complains of worsening axial back pain significantly impairing ADL.  Had 70% relief with lumbar RFA lasted 8 to 10 months.  Now symptoms have returned.    Continue  Zanaflex at as needed bedtime.    RTC  for procedure

## 2023-03-09 ENCOUNTER — OFFICE VISIT (OUTPATIENT)
Dept: CARDIOLOGY | Facility: CLINIC | Age: 73
End: 2023-03-09
Payer: MEDICARE

## 2023-03-09 VITALS
RESPIRATION RATE: 18 BRPM | OXYGEN SATURATION: 97 % | HEART RATE: 53 BPM | SYSTOLIC BLOOD PRESSURE: 132 MMHG | WEIGHT: 184 LBS | HEIGHT: 67 IN | BODY MASS INDEX: 28.88 KG/M2 | DIASTOLIC BLOOD PRESSURE: 71 MMHG

## 2023-03-09 DIAGNOSIS — I48.0 PAROXYSMAL ATRIAL FIBRILLATION: ICD-10-CM

## 2023-03-09 DIAGNOSIS — I34.0 MODERATE MITRAL REGURGITATION: ICD-10-CM

## 2023-03-09 DIAGNOSIS — Z95.1 S/P CABG X 4: ICD-10-CM

## 2023-03-09 DIAGNOSIS — E11.9 TYPE 2 DIABETES MELLITUS WITHOUT COMPLICATION, WITHOUT LONG-TERM CURRENT USE OF INSULIN: ICD-10-CM

## 2023-03-09 DIAGNOSIS — I25.5 ISCHEMIC CARDIOMYOPATHY: ICD-10-CM

## 2023-03-09 DIAGNOSIS — I25.718 CORONARY ARTERY DISEASE OF AUTOLOGOUS VEIN BYPASS GRAFT WITH STABLE ANGINA PECTORIS: ICD-10-CM

## 2023-03-09 DIAGNOSIS — I25.708 ATHEROSCLEROSIS OF CORONARY ARTERY BYPASS GRAFT(S), UNSPECIFIED, WITH OTHER FORMS OF ANGINA PECTORIS: ICD-10-CM

## 2023-03-09 PROCEDURE — 93000 ELECTROCARDIOGRAM COMPLETE: CPT | Performed by: INTERNAL MEDICINE

## 2023-03-09 PROCEDURE — 99214 OFFICE O/P EST MOD 30 MIN: CPT | Performed by: INTERNAL MEDICINE

## 2023-03-09 NOTE — PROGRESS NOTES
Cardiology Office Visit      Encounter Date:  03/09/2023    Patient ID:   Kaveh Vences is a 72 y.o. male.      Reason For Followup:  Coronary artery disease  Hypertension  Hyperlipidemia      Brief Clinical History:  Dear Skinny Harper MD    I had the pleasure of seeing Kaevh Vences today. As you are well aware, this is a 72 y.o. male with history of diabetes,  coronary artery disease, status post previous CABG in 2012, and PCI stenting.     cardiac catheterization with graft angiography showed LV ejection fraction of 40%, severe native three-vessel coronary disease with 90% calcified stenosis involving the distal left main involving the ostium of the LAD significant 99% lesion of left circumflex coronary artery.  100% occlusion of the nondominant right coronary artery was noted as well.  Ashby to the LAD was patent and saphenous vein graft to marginal branch was patent as well. Echocardiogram with LV ejection fraction of 45% with moderate mitral regurgitation      Interval History:  Denies any new complaints  No further chest pain  No unstable symptoms  Dizziness and shortness of breath have improved  No syncope  No significant change in the overall symptoms  Anginal symptoms are stable      Interpretation Summary    Extended Holter monitor for evaluation of palpitations  Patient was monitored from August 4, 2022 to September 5, 2022  Underlying rhythm is sinus rhythm with a minimal heart rate of 50 bpm and maximal heart rate of 120 bpm average heart rate of 63 bpm  No atrial fibrillation  No sustained ventricular or supraventricular tach arrhythmia  First-degree AV block  No clinically significant pauses  PVC burden of 5%  PAC burden of 1%  Relatively benign study  Clinical correlation is recommended    Interpretation Summary    · Left ventricular wall thickness is consistent with mild concentric hypertrophy.  · Estimated left ventricular EF = 60% Left ventricular systolic function is normal.  · There is  calcification of the aortic valve mainly affecting the left coronary cusp(s).  · Left ventricular diastolic function is consistent with (grade I) impaired relaxation.  · Mild dilation of the aortic root is present.  · Estimated right ventricular systolic pressure from tricuspid regurgitation is normal (<35 mmHg).    Interpretation Summary    · Myocardial perfusion study did not show any evidence of any significant reversible ischemia  · Myocardial perfusion study shows moderate size severe intensity mostly fixed perfusion defect involving the anterior anteroapical wall and anteroseptal wall likely secondary to underlying attenuation artifact from left bundle branch block  · Left ventricular ejection fraction is normal. (Calculated EF = 60%).  · Impressions are consistent with an intermediate risk study.  · Clinical correlation is recommended        Assessment & Plan    Impressions:  Coronary artery disease status post coronary artery bypass surgery  Hypertension  Hyperlipidemia  Sinus bradycardia with a left bundle branch block  Peripheral vascular disease/altered moderate obstructive carotid artery disease  Mild to moderate mitral regurgitation  Diabetes mellitus/most recent hemoglobin A1c was optimal  Postop atrial fibrillation currently maintaining sinus rhythm  Bilateral carotid stenosis in the moderate range  Intermittent asymptomatic bradycardia and also intermittent tachycardia  Stable angina  Mild bilateral carotid stenosis    Recommendations:  Recent hospitalization with extensive cardiac work-up with no significant pathology  Good functional capacity  Cardiogram discussed with the patient  Labs with the primary care physician office  Plan treat patient conservatively from cardiac standpoint  Continue current medical therapy  Patient is already on maximal medical therapy  Continue close monitoring  Results reviewed and discussed with patient  Labs reviewed and discussed with the patient  Labs with primary  "care physician office  Consider repeat carotid ultrasound next year  Bilateral carotid stenosis 16 to 49% range  Recent labs reviewed and discussed with patient and family  Continue current medical therapy continued aggressive risk factor modification  Okay to hold antiplatelet therapy for the back injections  Derease the dose of metoprolol to 12.5 mg p.o. twice daily secondary to bradycardia  Continued aggressive risk factor modification  Continue current medical therapy with aspirin 81 mg p.o. once a day Plavix 75 mg p.o. once a day Norvasc 5 mg p.o. once a day Lipitor 20 mg p.o. once a day lisinopril 20 mg p.o. once a day isosorbide 30 mg p.o. once a day metoprolol 12.5 mg p.o. twice daily Ranexa 1000 mg p.o. twice daily  Follow-up in office 6 months    Objective:    Vitals:  Vitals:    03/09/23 0919   BP: 132/71   BP Location: Left arm   Patient Position: Sitting   Cuff Size: Large Adult   Pulse: 53   Resp: 18   SpO2: 97%   Weight: 83.5 kg (184 lb)   Height: 170.2 cm (67\")       Physical Exam:    General: Alert, cooperative, no distress, appears stated age  Head:  Normocephalic, atraumatic, mucous membranes moist  Eyes:  Conjunctiva/corneas clear, EOM's intact     Neck:  Supple,  no adenopathy;      Lungs: Clear to auscultation bilaterally, no wheezes rhonchi rales are noted  Chest wall: No tenderness  Heart::  Regular rate and rhythm, S1 and S2 normal, no murmur, rub or gallop  Abdomen: Soft, non-tender, nondistended bowel sounds active  Extremities: No cyanosis, clubbing, or edema  Pulses: 2+ and symmetric all extremities  Skin:  No rashes or lesions  Neuro/psych: A&O x3. CN II through XII are grossly intact with appropriate affect      Allergies:  No Known Allergies    Medication Review:     Current Outpatient Medications:   •  amLODIPine (NORVASC) 5 MG tablet, Take 1 tablet by mouth once daily, Disp: 90 tablet, Rfl: 0  •  aspirin 81 MG EC tablet, Take 1 tablet by mouth Daily., Disp: , Rfl:   •  atorvastatin " (LIPITOR) 20 MG tablet, Take 1 tablet by mouth once daily, Disp: 90 tablet, Rfl: 0  •  clopidogrel (PLAVIX) 75 MG tablet, Take 1 tablet by mouth once daily, Disp: 90 tablet, Rfl: 2  •  isosorbide mononitrate (IMDUR) 30 MG 24 hr tablet, Take 1 tablet by mouth once daily, Disp: 90 tablet, Rfl: 0  •  lisinopril (PRINIVIL,ZESTRIL) 20 MG tablet, Take 1 tablet by mouth twice daily, Disp: 180 tablet, Rfl: 0  •  metFORMIN ER (GLUCOPHAGE-XR) 500 MG 24 hr tablet, TAKE 2 TABLETS BY MOUTH TWICE DAILY FOR 30 DAYS, Disp: , Rfl:   •  metoprolol tartrate (LOPRESSOR) 25 MG tablet, Take 1 tablet by mouth twice daily, Disp: 60 tablet, Rfl: 0  •  nitroglycerin (NITROSTAT) 0.4 MG SL tablet, Place 1 tablet under the tongue Every 5 (Five) Minutes As Needed for Chest Pain. Take no more than 3 doses in 15 minutes., Disp: 30 tablet, Rfl: 4  •  ranolazine (RANEXA) 1000 MG 12 hr tablet, TAKE 1 TABLET BY MOUTH EVERY 12 HOURS, Disp: 180 tablet, Rfl: 0  •  tiZANidine (ZANAFLEX) 4 MG tablet, Take 1 tablet by mouth Every 8 (Eight) Hours As Needed for Muscle Spasms., Disp: 90 tablet, Rfl: 5    Family History:  Family History   Problem Relation Age of Onset   • Coronary artery disease Father    • Coronary artery disease Brother    • Heart attack Brother    • Stroke Brother    • Diabetes Maternal Uncle        Past Medical History:  Past Medical History:   Diagnosis Date   • Atrial fibrillation (HCC)    • Coronary artery disease    • Diabetes mellitus (HCC)    • Gallstones    • Hyperlipidemia    • Hypertension    • Injury of back    • Low back pain    • Mitral regurgitation    • Neck pain    • Rheumatoid arthritis (HCC)    • Sleep apnea    • Valvular disease        Past surgical History:  Past Surgical History:   Procedure Laterality Date   • BACK SURGERY  01/2019   • CARDIAC CATHETERIZATION  2006, 2009, 2012, 2018   • CHOLECYSTECTOMY  12/22/2015   • CORONARY ANGIOPLASTY  2006, 2009   • CORONARY ARTERY BYPASS GRAFT  05/11/2012    x2   • ROTATOR CUFF  REPAIR Left        Social History:  Social History     Socioeconomic History   • Marital status:    Tobacco Use   • Smoking status: Never   • Smokeless tobacco: Never   Vaping Use   • Vaping Use: Never used   Substance and Sexual Activity   • Alcohol use: No   • Drug use: No   • Sexual activity: Defer       Review of Systems:  The following systems were reviewed as they relate to the cardiovascular system: Constitutional, Eyes, ENT, Cardiovascular, Respiratory, Gastrointestinal, Integumentary, Neurological, Psychiatric, Hematologic, Endocrine, Musculoskeletal, and Genitourinary. The pertinent cardiovascular findings are reported above with all other cardiovascular points within those systems being negative.    Diagnostic Study Review:     Current Electrocardiogram:    ECG 12 Lead    Date/Time: 3/9/2023 11:02 AM  Performed by: Cris Mcneill MD  Authorized by: Cris Mcneill MD   Comparison: compared with previous ECG   Similar to previous ECG  Rhythm: sinus rhythm and sinus bradycardia  Rate: normal  BPM: 53  Conduction: left bundle branch block  QRS axis: normal    Clinical impression: abnormal EKG              NOTE: The following portions of the patient's history were reviewed and updated this visit as appropriate: allergies, current medications, past family history, past medical history, past social history, past surgical history and problem list.

## 2023-03-13 RX ORDER — ATORVASTATIN CALCIUM 20 MG/1
TABLET, FILM COATED ORAL
Qty: 90 TABLET | Refills: 0 | Status: SHIPPED | OUTPATIENT
Start: 2023-03-13

## 2023-03-24 ENCOUNTER — HOSPITAL ENCOUNTER (OUTPATIENT)
Dept: PAIN MEDICINE | Facility: HOSPITAL | Age: 73
Discharge: HOME | End: 2023-03-24
Payer: MEDICARE

## 2023-03-24 VITALS
HEIGHT: 67 IN | BODY MASS INDEX: 28.88 KG/M2 | WEIGHT: 184 LBS | DIASTOLIC BLOOD PRESSURE: 63 MMHG | HEART RATE: 55 BPM | OXYGEN SATURATION: 96 % | TEMPERATURE: 97.3 F | SYSTOLIC BLOOD PRESSURE: 172 MMHG | RESPIRATION RATE: 16 BRPM

## 2023-03-24 DIAGNOSIS — R52 PAIN: ICD-10-CM

## 2023-03-24 DIAGNOSIS — M47.817 LUMBOSACRAL SPONDYLOSIS WITHOUT MYELOPATHY: ICD-10-CM

## 2023-03-24 PROCEDURE — 64635 DESTROY LUMB/SAC FACET JNT: CPT | Performed by: ANESTHESIOLOGY

## 2023-03-24 PROCEDURE — 77003 FLUOROGUIDE FOR SPINE INJECT: CPT

## 2023-03-24 PROCEDURE — 25010000002 METHYLPREDNISOLONE PER 40 MG: Performed by: ANESTHESIOLOGY

## 2023-03-24 PROCEDURE — 25010000002 ROPIVACAINE PER 1 MG: Performed by: ANESTHESIOLOGY

## 2023-03-24 PROCEDURE — 64636 DESTROY L/S FACET JNT ADDL: CPT | Performed by: ANESTHESIOLOGY

## 2023-03-24 RX ORDER — ROPIVACAINE HYDROCHLORIDE 2 MG/ML
10 INJECTION, SOLUTION EPIDURAL; INFILTRATION; PERINEURAL ONCE
Status: COMPLETED | OUTPATIENT
Start: 2023-03-24 | End: 2023-03-24

## 2023-03-24 RX ORDER — LIDOCAINE HYDROCHLORIDE 10 MG/ML
5 INJECTION, SOLUTION EPIDURAL; INFILTRATION; INTRACAUDAL; PERINEURAL ONCE
Status: COMPLETED | OUTPATIENT
Start: 2023-03-24 | End: 2023-03-24

## 2023-03-24 RX ORDER — METHYLPREDNISOLONE ACETATE 40 MG/ML
40 INJECTION, SUSPENSION INTRA-ARTICULAR; INTRALESIONAL; INTRAMUSCULAR; SOFT TISSUE ONCE
Status: COMPLETED | OUTPATIENT
Start: 2023-03-24 | End: 2023-03-24

## 2023-03-24 RX ADMIN — METHYLPREDNISOLONE ACETATE 40 MG: 40 INJECTION, SUSPENSION INTRA-ARTICULAR; INTRALESIONAL; INTRAMUSCULAR; INTRASYNOVIAL; SOFT TISSUE at 09:18

## 2023-03-24 RX ADMIN — LIDOCAINE HYDROCHLORIDE 5 ML: 10 INJECTION, SOLUTION EPIDURAL; INFILTRATION; INTRACAUDAL; PERINEURAL at 09:14

## 2023-03-24 RX ADMIN — ROPIVACAINE HYDROCHLORIDE 20 MG: 2 INJECTION EPIDURAL; INFILTRATION; PERINEURAL at 09:18

## 2023-03-24 NOTE — PROCEDURES
"Subjective    CC back pain  Kaveh Vences is a 72 y.o. male with lumbar spondylosis here for repeat left lumbar RFA.  Off Plavix 7 days    Pain Assessment   Location of Pain: Lower Back, R Hip, L Hip,   Description of Pain: Dull/Aching, Throbbing, Stabbing  Previous Pain Rating :7  Current Pain Ratin  Aggravating Factors: Activity  Alleviating Factors: Rest, Medication    The following portions of the patient's history were reviewed and updated as appropriate: allergies, current medications, past family history, past medical history, past social history, past surgical history and problem list.      Review of Systems  As in HPI  Objective   Physical Exam   Constitutional: He is oriented to person, place, and time. No distress.   Cardiovascular: Normal rate.   Pulmonary/Chest: Effort normal.   Musculoskeletal:      Lumbar back: He exhibits decreased range of motion and tenderness.   Neurological: He is alert and oriented to person, place, and time.     /63 (BP Location: Right arm, Patient Position: Sitting)   Pulse 55   Temp 97.3 °F (36.3 °C) (Tympanic)   Resp 16   Ht 170.2 cm (67\")   Wt 83.5 kg (184 lb)   SpO2 96%   BMI 28.82 kg/m²       Assessment & Plan    underwent repeat left lumbar RFA    RTC 6 weeks    DATE OF PROCEDURE:  3/24/2023     PREOPERATIVE DIAGNOSIS:   Lumbar spondylosis without myelopathy    POSTOPERATIVE DIAGNOSIS: same    PROCEDURE PERFORMED:  Left lumbar Sacral RFTC at  L4/L5, L5/S1.    The patient presents with a history of lumbosacral spondylosis on the left at level[  L4/L5 ] [ L5/ S1 ].  The patient presents today for lumbosacral RFTC.  The patient understands the risks and benefits of the procedure and wishes to proceed.  The patient was seen in the preoperative area.  Patient's consent was obtained and updated.  Vitals were taken.  Patient was then brought to the procedure suite and placed in a prone position.  The appropriate anatomic area was widely prepped with Chloraprep " and draped in a sterile fashion.  Noninvasive monitoring per routine anesthesia protocol was placed.  Under fluoroscopic guidance an AP view with caudad cephaled tilt was obtained.  A 20 guage RFTC cannula was passed through skin anesthetized with 1% Lidocaine without epinephrine.  The needle tip was guided to the superior medial aspect of the transverse process at [  L3 ][  L4 ][  L5 and  sacral ala ].  Motor stimulation was undertaken at 2.0 mAmps at 2 Hertz. At no point was motor stimulation or sensory stimulation noted in a radicular fashion.  Impedence range 200's. Prior to ablation, each level was anesthetized with 2mL of 1% Lidocaine without epinephrine. The medial branch nerves were then ablated at 80* C for 90 seconds each .  Following ablation, each level was injected with 1 mL of  expiration containing 1 mL of 40 mg Depo-Medrol and 4 mL of 0.25% bupivacaine and the RFTC cannula removed.  A sterile dressing was placed over the puncture site.    The patient tolerated the procedure with no complications. They were then brought to the post procedure area where they recovered nicely.     Discharge  The patient will be discharged home in stable condition.  Patient understands to contact the Center with any post procedure questions or concerns.  Discharge instructions given by nursing staff.

## 2023-03-24 NOTE — ADDENDUM NOTE
Encounter addended by: Chloé Beltran MD on: 3/24/2023 9:56 AM   Actions taken: Charge Capture section accepted

## 2023-03-24 NOTE — DISCHARGE INSTRUCTIONS
Radiofrequency Lesioning, Care After    Refer to this sheet in the next few weeks. These instructions provide you with information about caring for yourself after your procedure. Your health care provider may also give you more specific instructions. Your treatment has been planned according to current medical practices, but problems sometimes occur. Call your health care provider if you have any problems or questions after your procedure.  What can I expect after the procedure?  After the procedure, it is common to have:  Pain from the burned nerve.  You may feel a burning sensation for up to 1-2 weeks after the procedure  Temporary numbness at the site  Your leg/legs may be weak or feel numb after the procedure until the numbing medication wears off.  When you are up and walking, have assistance to prevent falling.     Follow these instructions at home:  Take over-the-counter and prescription medicines only as told by your health care provider.  Return to your normal activities as told by your health care provider. Ask your health care provider what activities are safe for you.  Pay close attention to how you feel after the procedure. If you start to have pain, write down when it hurts and how it feels. This will help you and your health care provider to know if you need an additional treatment.  Check your needle insertion site every day for signs of infection. Watch for:  Redness, swelling, or pain.  Fluid, blood, or pus.  Keep all follow-up visits as told by your health care provider. This is important.  No driving for 24 hrs-make sure you have full sensation in your legs prior to driving.  Avoid using heat on the injection site for 24 hours. You may use ice intermittently if needed by placing a               towel between your skin and the ice bag and using the ice for 20 minutes 2-3 times a day.  Do not take baths, swim or use a hot tub for 24 hours.  Leave your band-aids on for 24 hours and keep them  dry.    Contact a health care provider if:  Your pain does not get better.  You have redness, swelling, or pain at the needle insertion site.  You have fluid, blood, or pus coming from the needle insertion site.  You have a fever over 101 degrees.  You have new numb.ness in your arm or leg after the procedure    Get help right away if:  You develop sudden, severe pain.  You develop numbness or tingling near the procedure site that does not go away.  This information is not intended to replace advice given to you by your health care provider. Make sure you discuss any questions you have with your health care provider.  Document Released: 08/16/2012 Document Revised: 05/25/2017 Document Reviewed: 01/25/2016  [employer] Interactive Patient Education © 2019 [employer]

## 2023-04-03 RX ORDER — LISINOPRIL 20 MG/1
TABLET ORAL
Qty: 180 TABLET | Refills: 0 | Status: SHIPPED | OUTPATIENT
Start: 2023-04-03

## 2023-04-13 NOTE — PATIENT INSTRUCTIONS
Radiofrequency Lesioning, Care After    Refer to this sheet in the next few weeks. These instructions provide you with information about caring for yourself after your procedure. Your health care provider may also give you more specific instructions. Your treatment has been planned according to current medical practices, but problems sometimes occur. Call your health care provider if you have any problems or questions after your procedure.  What can I expect after the procedure?  After the procedure, it is common to have:  Pain from the burned nerve.  You may feel a burning sensation for up to 1-2 weeks after the procedure  Temporary numbness at the site  Your leg/legs may be weak or feel numb after the procedure until the numbing medication wears off.  When you are up and walking, have assistance to prevent falling.     Follow these instructions at home:  Take over-the-counter and prescription medicines only as told by your health care provider.  Return to your normal activities as told by your health care provider. Ask your health care provider what activities are safe for you.  Pay close attention to how you feel after the procedure. If you start to have pain, write down when it hurts and how it feels. This will help you and your health care provider to know if you need an additional treatment.  Check your needle insertion site every day for signs of infection. Watch for:  Redness, swelling, or pain.  Fluid, blood, or pus.  Keep all follow-up visits as told by your health care provider. This is important.  No driving for 24 hrs-make sure you have full sensation in your legs prior to driving.  Avoid using heat on the injection site for 24 hours. You may use ice intermittently if needed by placing a               towel between your skin and the ice bag and using the ice for 20 minutes 2-3 times a day.  Do not take baths, swim or use a hot tub for 24 hours.  Leave your band-aids on for 24 hours and keep them  dry.    Contact a health care provider if:  Your pain does not get better.  You have redness, swelling, or pain at the needle insertion site.  You have fluid, blood, or pus coming from the needle insertion site.  You have a fever over 101 degrees.  You have new numb.ness in your arm or leg after the procedure    Get help right away if:  You develop sudden, severe pain.  You develop numbness or tingling near the procedure site that does not go away.  This information is not intended to replace advice given to you by your health care provider. Make sure you discuss any questions you have with your health care provider.  Document Released: 08/16/2012 Document Revised: 05/25/2017 Document Reviewed: 01/25/2016  [employer] Interactive Patient Education © 2019 [employer]

## 2023-04-14 ENCOUNTER — HOSPITAL ENCOUNTER (OUTPATIENT)
Dept: PAIN MEDICINE | Facility: HOSPITAL | Age: 73
Discharge: HOME | End: 2023-04-14
Payer: MEDICARE

## 2023-04-14 VITALS
SYSTOLIC BLOOD PRESSURE: 115 MMHG | BODY MASS INDEX: 28.88 KG/M2 | WEIGHT: 184 LBS | TEMPERATURE: 97.6 F | OXYGEN SATURATION: 95 % | HEIGHT: 67 IN | HEART RATE: 56 BPM | RESPIRATION RATE: 16 BRPM | DIASTOLIC BLOOD PRESSURE: 71 MMHG

## 2023-04-14 DIAGNOSIS — M47.817 LUMBOSACRAL SPONDYLOSIS WITHOUT MYELOPATHY: ICD-10-CM

## 2023-04-14 DIAGNOSIS — R52 PAIN: ICD-10-CM

## 2023-04-14 PROCEDURE — 77003 FLUOROGUIDE FOR SPINE INJECT: CPT

## 2023-04-14 PROCEDURE — 25010000002 METHYLPREDNISOLONE PER 40 MG: Performed by: ANESTHESIOLOGY

## 2023-04-14 RX ORDER — METHYLPREDNISOLONE ACETATE 40 MG/ML
40 INJECTION, SUSPENSION INTRA-ARTICULAR; INTRALESIONAL; INTRAMUSCULAR; SOFT TISSUE ONCE
Status: COMPLETED | OUTPATIENT
Start: 2023-04-14 | End: 2023-04-14

## 2023-04-14 RX ORDER — LIDOCAINE HYDROCHLORIDE 10 MG/ML
5 INJECTION, SOLUTION EPIDURAL; INFILTRATION; INTRACAUDAL; PERINEURAL ONCE
Status: COMPLETED | OUTPATIENT
Start: 2023-04-14 | End: 2023-04-14

## 2023-04-14 RX ORDER — BUPIVACAINE HYDROCHLORIDE 2.5 MG/ML
10 INJECTION, SOLUTION EPIDURAL; INFILTRATION; INTRACAUDAL ONCE
Status: COMPLETED | OUTPATIENT
Start: 2023-04-14 | End: 2023-04-14

## 2023-04-14 RX ADMIN — BUPIVACAINE HYDROCHLORIDE 10 ML: 2.5 INJECTION, SOLUTION EPIDURAL; INFILTRATION; INTRACAUDAL; PERINEURAL at 10:46

## 2023-04-14 RX ADMIN — METHYLPREDNISOLONE ACETATE 40 MG: 40 INJECTION, SUSPENSION INTRA-ARTICULAR; INTRALESIONAL; INTRAMUSCULAR; INTRASYNOVIAL; SOFT TISSUE at 10:46

## 2023-04-14 RX ADMIN — LIDOCAINE HYDROCHLORIDE 5 ML: 10 INJECTION, SOLUTION EPIDURAL; INFILTRATION; INTRACAUDAL; PERINEURAL at 10:42

## 2023-04-14 RX ADMIN — LIDOCAINE HYDROCHLORIDE 5 ML: 10 INJECTION, SOLUTION EPIDURAL; INFILTRATION; INTRACAUDAL; PERINEURAL at 10:38

## 2023-04-17 RX ORDER — AMLODIPINE BESYLATE 5 MG/1
TABLET ORAL
Qty: 90 TABLET | Refills: 0 | Status: SHIPPED | OUTPATIENT
Start: 2023-04-17

## 2023-04-17 RX ORDER — RANOLAZINE 1000 MG/1
TABLET, EXTENDED RELEASE ORAL
Qty: 180 TABLET | Refills: 0 | Status: SHIPPED | OUTPATIENT
Start: 2023-04-17

## 2023-04-19 NOTE — PROCEDURES
"Subjective    CC back pain  Kaveh Vences is a 72 y.o. male with lumbar spondylosis here for repeat right lumbar RFA.  Off Plavix 7 days    Pain Assessment   Location of Pain: Lower Back, R Hip, L Hip,   Description of Pain: Dull/Aching, Throbbing, Stabbing  Previous Pain Rating :7  Current Pain Ratin  Aggravating Factors: Activity  Alleviating Factors: Rest, Medication    The following portions of the patient's history were reviewed and updated as appropriate: allergies, current medications, past family history, past medical history, past social history, past surgical history and problem list.      Review of Systems  As in HPI  Objective   Physical Exam   Constitutional: He is oriented to person, place, and time. No distress.   Cardiovascular: Normal rate.   Pulmonary/Chest: Effort normal.   Musculoskeletal:      Lumbar back: He exhibits decreased range of motion and tenderness.   Neurological: He is alert and oriented to person, place, and time.     /71 (BP Location: Left arm, Patient Position: Sitting)   Pulse 56   Temp 97.6 °F (36.4 °C) (Oral)   Resp 16   Ht 170.2 cm (67\")   Wt 83.5 kg (184 lb)   SpO2 95%   BMI 28.82 kg/m²       Assessment & Plan    underwent repeat right lumbar RFA    RTC 6 weeks    DATE OF PROCEDURE:  2023     PREOPERATIVE DIAGNOSIS:   Lumbar spondylosis without myelopathy    POSTOPERATIVE DIAGNOSIS: same    PROCEDURE PERFORMED:  Left lumbar Sacral RFTC at  L4/L5, L5/S1.    The patient presents with a history of lumbosacral spondylosis  at level[  L4/L5 ] [ L5/ S1 ].  The patient presents today for lumbosacral RFTC.  The patient understands the risks and benefits of the procedure and wishes to proceed.  The patient was seen in the preoperative area.  Patient's consent was obtained and updated.  Vitals were taken.  Patient was then brought to the procedure suite and placed in a prone position.  The appropriate anatomic area was widely prepped with Chloraprep and draped in a " sterile fashion.  Noninvasive monitoring per routine anesthesia protocol was placed.  Under fluoroscopic guidance an AP view with caudad cephaled tilt was obtained.  A 20 guage RFTC cannula was passed through skin anesthetized with 1% Lidocaine without epinephrine.  The needle tip was guided to the superior medial aspect of the transverse process at [  L4 ][  L5 and  sacral ala ].  Motor stimulation was undertaken at 2.0 mAmps at 2 Hertz. At no point was motor stimulation or sensory stimulation noted in a radicular fashion.  Impedence range 200's. Prior to ablation, each level was anesthetized with 2mL of 1% Lidocaine without epinephrine. The medial branch nerves were then ablated at 80* C for 90 seconds each .  Following ablation, each level was injected with 1 mL of  expiration containing 1 mL of 40 mg Depo-Medrol and 4 mL of 0.25% bupivacaine and the RFTC cannula removed.  A sterile dressing was placed over the puncture site.    The patient tolerated the procedure with no complications. They were then brought to the post procedure area where they recovered nicely.     Discharge  The patient will be discharged home in stable condition.  Patient understands to contact the Center with any post procedure questions or concerns.  Discharge instructions given by nursing staff.

## 2023-04-25 RX ORDER — CLOPIDOGREL BISULFATE 75 MG/1
TABLET ORAL
Qty: 90 TABLET | Refills: 0 | Status: SHIPPED | OUTPATIENT
Start: 2023-04-25 | End: 2023-04-28

## 2023-04-28 RX ORDER — CLOPIDOGREL BISULFATE 75 MG/1
TABLET ORAL
Qty: 90 TABLET | Refills: 0 | Status: SHIPPED | OUTPATIENT
Start: 2023-04-28

## 2023-05-16 ENCOUNTER — TELEPHONE (OUTPATIENT)
Dept: CARDIOLOGY | Facility: CLINIC | Age: 73
End: 2023-05-16

## 2023-05-16 NOTE — TELEPHONE ENCOUNTER
Caller: Kaveh Vences    Relationship to patient: Self    Best call back number: [phone]    Patient is needing:PATIENT STATED THAT HES BEEN FEELING FATIGUED, HAVING MEMORY ISSUES AND THAT THE LOWER NUMBER ON HIS BP HAS BEEN RUNNING UNDER 50.

## 2023-05-17 ENCOUNTER — OFFICE VISIT (OUTPATIENT)
Dept: CARDIOLOGY | Facility: CLINIC | Age: 73
End: 2023-05-17
Payer: MEDICARE

## 2023-05-17 VITALS
SYSTOLIC BLOOD PRESSURE: 103 MMHG | DIASTOLIC BLOOD PRESSURE: 61 MMHG | HEIGHT: 67 IN | RESPIRATION RATE: 18 BRPM | HEART RATE: 62 BPM | BODY MASS INDEX: 28.25 KG/M2 | WEIGHT: 180 LBS | OXYGEN SATURATION: 97 %

## 2023-05-17 DIAGNOSIS — Z95.1 S/P CABG X 4: ICD-10-CM

## 2023-05-17 DIAGNOSIS — I50.30 CONGESTIVE HEART FAILURE WITH LV DIASTOLIC DYSFUNCTION, NYHA CLASS 3: ICD-10-CM

## 2023-05-17 DIAGNOSIS — I25.5 ISCHEMIC CARDIOMYOPATHY: ICD-10-CM

## 2023-05-17 DIAGNOSIS — I25.708 ATHEROSCLEROSIS OF CORONARY ARTERY BYPASS GRAFT(S), UNSPECIFIED, WITH OTHER FORMS OF ANGINA PECTORIS: ICD-10-CM

## 2023-05-17 DIAGNOSIS — I48.0 PAROXYSMAL ATRIAL FIBRILLATION: ICD-10-CM

## 2023-05-17 DIAGNOSIS — I42.0 DILATED CARDIOMYOPATHY: ICD-10-CM

## 2023-05-17 DIAGNOSIS — E78.2 MIXED HYPERLIPIDEMIA: ICD-10-CM

## 2023-05-17 DIAGNOSIS — I25.718 CORONARY ARTERY DISEASE OF AUTOLOGOUS VEIN BYPASS GRAFT WITH STABLE ANGINA PECTORIS: Primary | ICD-10-CM

## 2023-05-17 PROCEDURE — 3078F DIAST BP <80 MM HG: CPT | Performed by: INTERNAL MEDICINE

## 2023-05-17 PROCEDURE — 1160F RVW MEDS BY RX/DR IN RCRD: CPT | Performed by: INTERNAL MEDICINE

## 2023-05-17 PROCEDURE — 1159F MED LIST DOCD IN RCRD: CPT | Performed by: INTERNAL MEDICINE

## 2023-05-17 PROCEDURE — 3074F SYST BP LT 130 MM HG: CPT | Performed by: INTERNAL MEDICINE

## 2023-05-17 PROCEDURE — 93000 ELECTROCARDIOGRAM COMPLETE: CPT | Performed by: INTERNAL MEDICINE

## 2023-05-17 PROCEDURE — 99214 OFFICE O/P EST MOD 30 MIN: CPT | Performed by: INTERNAL MEDICINE

## 2023-05-17 NOTE — PROGRESS NOTES
Cardiology Office Visit      Encounter Date:  05/17/2023    Patient ID:   Kaveh Vences is a 72 y.o. male.      Reason For Followup:  Coronary artery disease  Hypertension  Hyperlipidemia      Brief Clinical History:  Dear Skinny Harper MD    I had the pleasure of seeing Kaveh Vences today. As you are well aware, this is a 72 y.o. male with history of diabetes,  coronary artery disease, status post previous CABG in 2012, and PCI stenting.     cardiac catheterization with graft angiography showed LV ejection fraction of 40%, severe native three-vessel coronary disease with 90% calcified stenosis involving the distal left main involving the ostium of the LAD significant 99% lesion of left circumflex coronary artery.  100% occlusion of the nondominant right coronary artery was noted as well.  Ashby to the LAD was patent and saphenous vein graft to marginal branch was patent as well. Echocardiogram with LV ejection fraction of 45% with moderate mitral regurgitation      Interval History:  Planing of dizziness fatigue weakness  Complaining of some orthostasis  Blood pressure is low at home  Heart rate is running low  No chest pain  No syncope      Interpretation Summary    Extended Holter monitor for evaluation of palpitations  Patient was monitored from August 4, 2022 to September 5, 2022  Underlying rhythm is sinus rhythm with a minimal heart rate of 50 bpm and maximal heart rate of 120 bpm average heart rate of 63 bpm  No atrial fibrillation  No sustained ventricular or supraventricular tach arrhythmia  First-degree AV block  No clinically significant pauses  PVC burden of 5%  PAC burden of 1%  Relatively benign study  Clinical correlation is recommended    Interpretation Summary    · Left ventricular wall thickness is consistent with mild concentric hypertrophy.  · Estimated left ventricular EF = 60% Left ventricular systolic function is normal.  · There is calcification of the aortic valve mainly affecting the  left coronary cusp(s).  · Left ventricular diastolic function is consistent with (grade I) impaired relaxation.  · Mild dilation of the aortic root is present.  · Estimated right ventricular systolic pressure from tricuspid regurgitation is normal (<35 mmHg).    Interpretation Summary    · Myocardial perfusion study did not show any evidence of any significant reversible ischemia  · Myocardial perfusion study shows moderate size severe intensity mostly fixed perfusion defect involving the anterior anteroapical wall and anteroseptal wall likely secondary to underlying attenuation artifact from left bundle branch block  · Left ventricular ejection fraction is normal. (Calculated EF = 60%).  · Impressions are consistent with an intermediate risk study.  · Clinical correlation is recommended        Assessment & Plan    Impressions:  Coronary artery disease status post coronary artery bypass surgery  Hypertension  Hyperlipidemia  Sinus bradycardia with a left bundle branch block  Peripheral vascular disease/altered moderate obstructive carotid artery disease  Mild to moderate mitral regurgitation  Diabetes mellitus/most recent hemoglobin A1c was optimal  Postop atrial fibrillation currently maintaining sinus rhythm  Bilateral carotid stenosis in the moderate range  Intermittent asymptomatic bradycardia and also intermittent tachycardia  Stable angina  Mild bilateral carotid stenosis    Recommendations:    Good functional capacity  Discontinue metoprolol  Discontinue amlodipine  Close monitoring of heart rate and blood pressure at home  Monitoring of heart rate on Apple Watch  Continued aggressive risk factor modification  Continue current medical therapy with aspirin 81 mg p.o. once a day Plavix 75 mg p.o. once a day  Lipitor 20 mg p.o. once a day lisinopril 20 mg p.o. once a day isosorbide 30 mg p.o. once a day Ranexa 1000 mg p.o. twice daily  Consider extended Holter monitor if patient continues to have bradycardia  No  "orthostasis on orthostatic vitals in the office today  EKG with a heart rate of 60 with left bundle branch block  Follow-up in office 6 months  Objective:    Vitals:  Vitals:    05/17/23 1436   BP: 103/61   BP Location: Left arm   Patient Position: Sitting   Cuff Size: Large Adult   Pulse: 62   Resp: 18   SpO2: 97%   Weight: 81.6 kg (180 lb)   Height: 170.2 cm (67\")       Physical Exam:    General: Alert, cooperative, no distress, appears stated age  Head:  Normocephalic, atraumatic, mucous membranes moist  Eyes:  Conjunctiva/corneas clear, EOM's intact     Neck:  Supple,  no adenopathy;      Lungs: Clear to auscultation bilaterally, no wheezes rhonchi rales are noted  Chest wall: No tenderness  Heart::  Regular rate and rhythm, S1 and S2 normal, no murmur, rub or gallop  Abdomen: Soft, non-tender, nondistended bowel sounds active  Extremities: No cyanosis, clubbing, or edema  Pulses: 2+ and symmetric all extremities  Skin:  No rashes or lesions  Neuro/psych: A&O x3. CN II through XII are grossly intact with appropriate affect      Allergies:  No Known Allergies    Medication Review:     Current Outpatient Medications:   •  aspirin 81 MG EC tablet, Take 1 tablet by mouth Daily., Disp: , Rfl:   •  atorvastatin (LIPITOR) 20 MG tablet, Take 1 tablet by mouth once daily, Disp: 90 tablet, Rfl: 0  •  clopidogrel (PLAVIX) 75 MG tablet, Take 1 tablet by mouth once daily, Disp: 90 tablet, Rfl: 0  •  isosorbide mononitrate (IMDUR) 30 MG 24 hr tablet, Take 1 tablet by mouth once daily, Disp: 90 tablet, Rfl: 0  •  lisinopril (PRINIVIL,ZESTRIL) 20 MG tablet, Take 1 tablet by mouth twice daily, Disp: 180 tablet, Rfl: 0  •  metFORMIN ER (GLUCOPHAGE-XR) 500 MG 24 hr tablet, TAKE 2 TABLETS BY MOUTH TWICE DAILY FOR 30 DAYS, Disp: , Rfl:   •  nitroglycerin (NITROSTAT) 0.4 MG SL tablet, Place 1 tablet under the tongue Every 5 (Five) Minutes As Needed for Chest Pain. Take no more than 3 doses in 15 minutes., Disp: 30 tablet, Rfl: 4  •  " ranolazine (RANEXA) 1000 MG 12 hr tablet, TAKE 1 TABLET BY MOUTH EVERY 12 HOURS, Disp: 180 tablet, Rfl: 0  •  tiZANidine (ZANAFLEX) 4 MG tablet, Take 1 tablet by mouth Every 8 (Eight) Hours As Needed for Muscle Spasms., Disp: 90 tablet, Rfl: 5    Family History:  Family History   Problem Relation Age of Onset   • Coronary artery disease Father    • Coronary artery disease Brother    • Heart attack Brother    • Stroke Brother    • Diabetes Maternal Uncle        Past Medical History:  Past Medical History:   Diagnosis Date   • Atrial fibrillation    • Coronary artery disease    • Diabetes mellitus    • Gallstones    • Hyperlipidemia    • Hypertension    • Injury of back    • Low back pain    • Mitral regurgitation    • Neck pain    • Rheumatoid arthritis    • Sleep apnea    • Valvular disease        Past surgical History:  Past Surgical History:   Procedure Laterality Date   • BACK SURGERY  01/2019   • CARDIAC CATHETERIZATION  2006, 2009, 2012, 2018   • CHOLECYSTECTOMY  12/22/2015   • CORONARY ANGIOPLASTY  2006, 2009   • CORONARY ARTERY BYPASS GRAFT  05/11/2012    x2   • ROTATOR CUFF REPAIR Left        Social History:  Social History     Socioeconomic History   • Marital status:    Tobacco Use   • Smoking status: Never   • Smokeless tobacco: Never   Vaping Use   • Vaping Use: Never used   Substance and Sexual Activity   • Alcohol use: No   • Drug use: No   • Sexual activity: Defer       Review of Systems:  The following systems were reviewed as they relate to the cardiovascular system: Constitutional, Eyes, ENT, Cardiovascular, Respiratory, Gastrointestinal, Integumentary, Neurological, Psychiatric, Hematologic, Endocrine, Musculoskeletal, and Genitourinary. The pertinent cardiovascular findings are reported above with all other cardiovascular points within those systems being negative.    Diagnostic Study Review:     Current Electrocardiogram:    ECG 12 Lead    Date/Time: 5/17/2023 3:38 PM  Performed by:  Cris Mcneill MD  Authorized by: Cris Mcneill MD   Comparison: compared with previous ECG   Similar to previous ECG  Rhythm: sinus rhythm  Rate: normal  BPM: 63  Conduction: left bundle branch block  QRS axis: normal    Clinical impression: abnormal EKG              NOTE: The following portions of the patient's history were reviewed and updated this visit as appropriate: allergies, current medications, past family history, past medical history, past social history, past surgical history and problem list.

## 2023-05-24 RX ORDER — ISOSORBIDE MONONITRATE 30 MG/1
TABLET, EXTENDED RELEASE ORAL
Qty: 90 TABLET | Refills: 0 | Status: SHIPPED | OUTPATIENT
Start: 2023-05-24

## 2023-05-26 RX ORDER — ISOSORBIDE MONONITRATE 30 MG/1
TABLET, EXTENDED RELEASE ORAL
Qty: 90 TABLET | Refills: 0 | OUTPATIENT
Start: 2023-05-26

## 2023-05-31 ENCOUNTER — OFFICE VISIT (OUTPATIENT)
Dept: CARDIOLOGY | Facility: CLINIC | Age: 73
End: 2023-05-31

## 2023-05-31 VITALS
RESPIRATION RATE: 18 BRPM | OXYGEN SATURATION: 95 % | HEART RATE: 77 BPM | DIASTOLIC BLOOD PRESSURE: 70 MMHG | WEIGHT: 179 LBS | SYSTOLIC BLOOD PRESSURE: 128 MMHG | BODY MASS INDEX: 28.09 KG/M2 | HEIGHT: 67 IN

## 2023-05-31 DIAGNOSIS — I25.718 CORONARY ARTERY DISEASE OF AUTOLOGOUS VEIN BYPASS GRAFT WITH STABLE ANGINA PECTORIS: Primary | ICD-10-CM

## 2023-05-31 DIAGNOSIS — I42.0 DILATED CARDIOMYOPATHY: ICD-10-CM

## 2023-05-31 DIAGNOSIS — I50.30 CONGESTIVE HEART FAILURE WITH LV DIASTOLIC DYSFUNCTION, NYHA CLASS 3: ICD-10-CM

## 2023-05-31 DIAGNOSIS — I20.9 ANGINA PECTORIS: ICD-10-CM

## 2023-05-31 DIAGNOSIS — I25.708 ATHEROSCLEROSIS OF CORONARY ARTERY BYPASS GRAFT(S), UNSPECIFIED, WITH OTHER FORMS OF ANGINA PECTORIS: ICD-10-CM

## 2023-05-31 DIAGNOSIS — I25.5 ISCHEMIC CARDIOMYOPATHY: ICD-10-CM

## 2023-05-31 DIAGNOSIS — I48.0 PAROXYSMAL ATRIAL FIBRILLATION: ICD-10-CM

## 2023-05-31 DIAGNOSIS — I34.0 MODERATE MITRAL REGURGITATION: ICD-10-CM

## 2023-05-31 DIAGNOSIS — Z95.1 S/P CABG X 4: ICD-10-CM

## 2023-05-31 PROCEDURE — 3078F DIAST BP <80 MM HG: CPT | Performed by: INTERNAL MEDICINE

## 2023-05-31 PROCEDURE — 93000 ELECTROCARDIOGRAM COMPLETE: CPT | Performed by: INTERNAL MEDICINE

## 2023-05-31 PROCEDURE — 99214 OFFICE O/P EST MOD 30 MIN: CPT | Performed by: INTERNAL MEDICINE

## 2023-05-31 PROCEDURE — 3074F SYST BP LT 130 MM HG: CPT | Performed by: INTERNAL MEDICINE

## 2023-05-31 PROCEDURE — 1160F RVW MEDS BY RX/DR IN RCRD: CPT | Performed by: INTERNAL MEDICINE

## 2023-05-31 PROCEDURE — 1159F MED LIST DOCD IN RCRD: CPT | Performed by: INTERNAL MEDICINE

## 2023-05-31 RX ORDER — LISINOPRIL 40 MG/1
40 TABLET ORAL DAILY
Qty: 90 TABLET | Refills: 3 | Status: SHIPPED | OUTPATIENT
Start: 2023-05-31

## 2023-05-31 NOTE — PROGRESS NOTES
Cardiology Office Visit      Encounter Date:  05/31/2023    Patient ID:   Kaveh Vences is a 72 y.o. male.  Reason For Followup:  Coronary artery disease  Hypertension  Hyperlipidemia      Brief Clinical History:  Dear Skinny Harper MD    I had the pleasure of seeing Kaveh Vences today. As you are well aware, this is a 72 y.o. male with history of diabetes,  coronary artery disease, status post previous CABG in 2012, and PCI stenting.     cardiac catheterization with graft angiography showed LV ejection fraction of 40%, severe native three-vessel coronary disease with 90% calcified stenosis involving the distal left main involving the ostium of the LAD significant 99% lesion of left circumflex coronary artery.  100% occlusion of the nondominant right coronary artery was noted as well.  Ashby to the LAD was patent and saphenous vein graft to marginal branch was patent as well. Echocardiogram with LV ejection fraction of 45% with moderate mitral regurgitation      Interval History:  Bradycardia fatigue has improved  Complaining of some intermittent occasional chest discomfort requiring nitroglycerin at least twice in the last few weeks  Clinically feels better now  Blood pressure is somewhat elevated  No chest pain  No syncope      Interpretation Summary    Extended Holter monitor for evaluation of palpitations  Patient was monitored from August 4, 2022 to September 5, 2022  Underlying rhythm is sinus rhythm with a minimal heart rate of 50 bpm and maximal heart rate of 120 bpm average heart rate of 63 bpm  No atrial fibrillation  No sustained ventricular or supraventricular tach arrhythmia  First-degree AV block  No clinically significant pauses  PVC burden of 5%  PAC burden of 1%  Relatively benign study  Clinical correlation is recommended    Interpretation Summary    · Left ventricular wall thickness is consistent with mild concentric hypertrophy.  · Estimated left ventricular EF = 60% Left ventricular systolic  function is normal.  · There is calcification of the aortic valve mainly affecting the left coronary cusp(s).  · Left ventricular diastolic function is consistent with (grade I) impaired relaxation.  · Mild dilation of the aortic root is present.  · Estimated right ventricular systolic pressure from tricuspid regurgitation is normal (<35 mmHg).    Interpretation Summary    · Myocardial perfusion study did not show any evidence of any significant reversible ischemia  · Myocardial perfusion study shows moderate size severe intensity mostly fixed perfusion defect involving the anterior anteroapical wall and anteroseptal wall likely secondary to underlying attenuation artifact from left bundle branch block  · Left ventricular ejection fraction is normal. (Calculated EF = 60%).  · Impressions are consistent with an intermediate risk study.  · Clinical correlation is recommended        Assessment & Plan    Impressions:  Coronary artery disease status post coronary artery bypass surgery  Hypertension  Hyperlipidemia  Sinus bradycardia with a left bundle branch block  Peripheral vascular disease/altered moderate obstructive carotid artery disease  Mild to moderate mitral regurgitation  Diabetes mellitus/most recent hemoglobin A1c was optimal  Postop atrial fibrillation currently maintaining sinus rhythm  Bilateral carotid stenosis in the moderate range  Intermittent asymptomatic bradycardia and also intermittent tachycardia  Stable angina  Mild bilateral carotid stenosis  Intermittent chest discomfort    Recommendations:    Good functional capacity  Discontinue metoprolol  Discontinue amlodipine  Close monitoring of heart rate and blood pressure at home  Monitoring of heart rate on Apple Watch  Continued aggressive risk factor modification  Continue current medical therapy with aspirin 81 mg p.o. once a day Plavix 75 mg p.o. once a day  Lipitor 20 mg p.o. once a day lisinopril 20 mg p.o. once a day isosorbide 30 mg p.o.  "once a day Ranexa 1000 mg p.o. twice daily  Heart rate has improved with discontinuing beta-blocker  Increase the dose of lisinopril from 20 to 40 mg p.o. once a day secondary to elevated blood pressure  Myocardial perfusion study for further restratification for symptoms of chest discomfort    Follow-up in office 3 months    Objective:    Vitals:  Vitals:    05/31/23 1500   BP: 128/70   BP Location: Left arm   Patient Position: Sitting   Cuff Size: Large Adult   Pulse: 77   Resp: 18   SpO2: 95%   Weight: 81.2 kg (179 lb)   Height: 170.2 cm (67\")       Physical Exam:    General: Alert, cooperative, no distress, appears stated age  Head:  Normocephalic, atraumatic, mucous membranes moist  Eyes:  Conjunctiva/corneas clear, EOM's intact     Neck:  Supple,  no adenopathy;      Lungs: Clear to auscultation bilaterally, no wheezes rhonchi rales are noted  Chest wall: No tenderness  Heart::  Regular rate and rhythm, S1 and S2 normal, no murmur, rub or gallop  Abdomen: Soft, non-tender, nondistended bowel sounds active  Extremities: No cyanosis, clubbing, or edema  Pulses: 2+ and symmetric all extremities  Skin:  No rashes or lesions  Neuro/psych: A&O x3. CN II through XII are grossly intact with appropriate affect      Allergies:  No Known Allergies    Medication Review:     Current Outpatient Medications:   •  aspirin 81 MG EC tablet, Take 1 tablet by mouth Daily., Disp: , Rfl:   •  atorvastatin (LIPITOR) 20 MG tablet, Take 1 tablet by mouth once daily, Disp: 90 tablet, Rfl: 0  •  clopidogrel (PLAVIX) 75 MG tablet, Take 1 tablet by mouth once daily, Disp: 90 tablet, Rfl: 0  •  isosorbide mononitrate (IMDUR) 30 MG 24 hr tablet, Take 1 tablet by mouth once daily, Disp: 90 tablet, Rfl: 0  •  metFORMIN ER (GLUCOPHAGE-XR) 500 MG 24 hr tablet, TAKE 2 TABLETS BY MOUTH TWICE DAILY FOR 30 DAYS, Disp: , Rfl:   •  nitroglycerin (NITROSTAT) 0.4 MG SL tablet, Place 1 tablet under the tongue Every 5 (Five) Minutes As Needed for Chest " Pain. Take no more than 3 doses in 15 minutes., Disp: 30 tablet, Rfl: 4  •  ranolazine (RANEXA) 1000 MG 12 hr tablet, TAKE 1 TABLET BY MOUTH EVERY 12 HOURS, Disp: 180 tablet, Rfl: 0  •  tiZANidine (ZANAFLEX) 4 MG tablet, Take 1 tablet by mouth Every 8 (Eight) Hours As Needed for Muscle Spasms., Disp: 90 tablet, Rfl: 5  •  lisinopril (PRINIVIL,ZESTRIL) 40 MG tablet, Take 1 tablet by mouth Daily., Disp: 90 tablet, Rfl: 3    Family History:  Family History   Problem Relation Age of Onset   • Coronary artery disease Father    • Coronary artery disease Brother    • Heart attack Brother    • Stroke Brother    • Diabetes Maternal Uncle        Past Medical History:  Past Medical History:   Diagnosis Date   • Atrial fibrillation    • Coronary artery disease    • Diabetes mellitus    • Gallstones    • Hyperlipidemia    • Hypertension    • Injury of back    • Low back pain    • Mitral regurgitation    • Neck pain    • Rheumatoid arthritis    • Sleep apnea    • Valvular disease        Past surgical History:  Past Surgical History:   Procedure Laterality Date   • BACK SURGERY  01/2019   • CARDIAC CATHETERIZATION  2006, 2009, 2012, 2018   • CHOLECYSTECTOMY  12/22/2015   • CORONARY ANGIOPLASTY  2006, 2009   • CORONARY ARTERY BYPASS GRAFT  05/11/2012    x2   • ROTATOR CUFF REPAIR Left        Social History:  Social History     Socioeconomic History   • Marital status:    Tobacco Use   • Smoking status: Never   • Smokeless tobacco: Never   Vaping Use   • Vaping Use: Never used   Substance and Sexual Activity   • Alcohol use: No   • Drug use: No   • Sexual activity: Defer       Review of Systems:  The following systems were reviewed as they relate to the cardiovascular system: Constitutional, Eyes, ENT, Cardiovascular, Respiratory, Gastrointestinal, Integumentary, Neurological, Psychiatric, Hematologic, Endocrine, Musculoskeletal, and Genitourinary. The pertinent cardiovascular findings are reported above with all other  cardiovascular points within those systems being negative.    Diagnostic Study Review:     Current Electrocardiogram:    ECG 12 Lead    Date/Time: 5/31/2023 3:24 PM  Performed by: Cris Mcneill MD  Authorized by: Cris Mcneill MD   Comparison: compared with previous ECG   Similar to previous ECG  Rhythm: sinus rhythm  Rate: normal  BPM: 77  Conduction: left bundle branch block  QRS axis: normal    Clinical impression: abnormal EKG              NOTE: The following portions of the patient's history were reviewed and updated this visit as appropriate: allergies, current medications, past family history, past medical history, past social history, past surgical history and problem list.

## 2023-06-01 ENCOUNTER — OFFICE VISIT (OUTPATIENT)
Dept: PAIN MEDICINE | Facility: CLINIC | Age: 73
End: 2023-06-01

## 2023-06-01 VITALS
RESPIRATION RATE: 16 BRPM | HEART RATE: 66 BPM | DIASTOLIC BLOOD PRESSURE: 77 MMHG | SYSTOLIC BLOOD PRESSURE: 126 MMHG | OXYGEN SATURATION: 96 %

## 2023-06-01 DIAGNOSIS — G89.4 CHRONIC PAIN SYNDROME: Primary | ICD-10-CM

## 2023-06-01 DIAGNOSIS — M25.50 POLYARTHRALGIA: ICD-10-CM

## 2023-06-01 DIAGNOSIS — M96.1 POSTLAMINECTOMY SYNDROME OF LUMBAR REGION: ICD-10-CM

## 2023-06-01 DIAGNOSIS — M47.812 CERVICAL SPONDYLOSIS WITHOUT MYELOPATHY: ICD-10-CM

## 2023-06-01 DIAGNOSIS — M47.817 LUMBOSACRAL SPONDYLOSIS WITHOUT MYELOPATHY: ICD-10-CM

## 2023-06-01 RX ORDER — TIZANIDINE 4 MG/1
4 TABLET ORAL EVERY 8 HOURS PRN
Qty: 90 TABLET | Refills: 5 | Status: SHIPPED | OUTPATIENT
Start: 2023-06-01

## 2023-06-01 NOTE — PROGRESS NOTES
Subjective    CC Back pain  Kaveh Vences is a 72 y.o. male with chronic back pain history of decompression/L2-3 microdiscectomy in 2019/Dr. Brandon, here for f/u.  Repeat lumbar RFA reports 60 to 70% relief with functional benefits.  Denies any new complaints today.  Continues to take Zanaflex at bedtime with good relief.  Chronic axial back pain radiating to bilateral hip without radicular pain worse with standing walking or prolonged activity.  Denies weakness, saddle anesthesia bladder bowel continence.  Chronic neck pain radiating to head and bilateral shoulders worse at night and associated with muscle spasm headaches.  Denies weakness, balance issues, bladder bowel continence.    Tried physical therapy and medication with marginal relief.  Interfering with ADL and sleep.   Seen neurosurgery, no surgery recommended.    C-spine MRI  Multilevel degenerative disc disease most notably at C5-C6 and C6-C7 with mild spinal canal stenosis. Moderate right-sided neuroforaminal narrowing at C6-C7 with mild left-sided neuroforaminal narrowing.  L-spine x-ray  Good range of motion with flexion and extension.  No evidence of subluxation. Persistent degenerative disc space narrowing at the L3/4 and L5/P9fqesep, unchanged from prior study.   L-spine MRI  degenerative disc disease and mild facet degenerative change. Findings are most pronounced at L2-L3 were is moderate to severe neural foramen narrowing on the left due to the large focal disc extrusion.  mild neural foramen narrowing on the right at the L2-L3 level due to a smaller focal extrusion    Pain Assessment   Location of Pain: Lower Back  Description of Pain: Dull/Aching, Throbbing, Stabbing  Previous Pain Rating :2  Current Pain Ratin  Aggravating Factors: Activity  Alleviating Factors: Rest, Medication    PEG Assessment   What number best describes your pain on average in the past week?1  What number best describes how, during the past week,  pain has interfered with your enjoyment of life?0  What number best describes how, during the past week, pain has interfered with your general activity?7    The following portions of the patient's history were reviewed and updated as appropriate: allergies, current medications, past family history, past medical history, past social history, past surgical history and problem list.     has a past medical history of Atrial fibrillation, Coronary artery disease, Diabetes mellitus, Gallstones, Hyperlipidemia, Hypertension, Injury of back, Low back pain, Mitral regurgitation, Neck pain, Rheumatoid arthritis, Sleep apnea, and Valvular disease.   has a past surgical history that includes Coronary artery bypass graft (05/11/2012); Coronary angioplasty (2006, 2009); Rotator cuff repair (Left); Cholecystectomy (12/22/2015); Cardiac catheterization (2006, 2009, 2012, 2018); and Back surgery (01/2019).  family history includes Coronary artery disease in his brother and father; Diabetes in his maternal uncle; Heart attack in his brother; Stroke in his brother.  Social History     Tobacco Use   • Smoking status: Never   • Smokeless tobacco: Never   Substance Use Topics   • Alcohol use: No       Review of Systems   Musculoskeletal: Positive for arthralgias, back pain, myalgias, neck pain and neck stiffness.   All other systems reviewed and are negative.      Objective   Physical Exam   Constitutional: No distress.   Pulmonary/Chest: Effort normal.   Musculoskeletal:      Cervical back: He exhibits decreased range of motion and tenderness.      Lumbar back: He exhibits tenderness.   Vitals reviewed.    /77   Pulse 66   Resp 16   SpO2 96%      PHQ 9 on chart  Opioid risk tool low risk    Assessment & Plan   Diagnoses and all orders for this visit:    1. Chronic pain syndrome (Primary)  -     tiZANidine (ZANAFLEX) 4 MG tablet; Take 1 tablet by mouth Every 8 (Eight) Hours As Needed for Muscle Spasms.  Dispense: 90 tablet;  Refill: 5    2. Lumbosacral spondylosis without myelopathy    3. Cervical spondylosis without myelopathy    4. Postlaminectomy syndrome of lumbar region    5. Polyarthralgia    Summary  Kaveh Vences is a 72 y.o. male with chronic back pain history of decompression/L2-3 microdiscectomy in January 2019/Dr. Brandon, chronic neck pain here for f/u.   Chronic neck pain from DDD spondylosis.  Chronic back pain from postlaminectomy syndrome and chronic polyarthralgia.  Repeat lumbar RFA or cervical CLARENCE as needed.    Repeat lumbar RFA reports 60 to 70% relief with functional benefits.  Denies any new complaints today.  Continues to take Zanaflex at bedtime with good relief.    Continue  Zanaflex at as needed bedtime.    RTC  for procedure or in 6 months

## 2023-06-05 ENCOUNTER — APPOINTMENT (OUTPATIENT)
Dept: CARDIOLOGY | Facility: HOSPITAL | Age: 73
End: 2023-06-05
Payer: MEDICARE

## 2023-06-05 ENCOUNTER — TELEPHONE (OUTPATIENT)
Dept: CARDIOLOGY | Facility: CLINIC | Age: 73
End: 2023-06-05
Payer: MEDICARE

## 2023-06-05 RX ORDER — AMLODIPINE BESYLATE 5 MG/1
5 TABLET ORAL DAILY
Qty: 30 TABLET | Refills: 2 | Status: SHIPPED | OUTPATIENT
Start: 2023-06-05

## 2023-06-05 NOTE — TELEPHONE ENCOUNTER
Called and notified patient and he verbalized understanding    ----------------------------------------    Cris Mcneill MD [place], MA  Add Norvasc 5 mg p.o. once a day          Previous Messages       ----- Message -----  From:  NAVI Maza  Sent: 6/5/2023   9:31 AM EDT  To: Cris Mcneill MD  Subject: Lisinopril                                      Patient forgot his primary had already in creased the lisinopril to 40mg a day. So he is already taking 40mg when he saw you in office last week.    Do you want to make any changes.    Please advise

## 2023-06-08 ENCOUNTER — HOSPITAL ENCOUNTER (OUTPATIENT)
Dept: CARDIOLOGY | Facility: HOSPITAL | Age: 73
Discharge: HOME | End: 2023-06-08
Payer: MEDICARE

## 2023-06-08 DIAGNOSIS — I25.718 CORONARY ARTERY DISEASE OF AUTOLOGOUS VEIN BYPASS GRAFT WITH STABLE ANGINA PECTORIS: ICD-10-CM

## 2023-06-08 DIAGNOSIS — Z95.1 S/P CABG X 4: ICD-10-CM

## 2023-06-08 DIAGNOSIS — I50.30 CONGESTIVE HEART FAILURE WITH LV DIASTOLIC DYSFUNCTION, NYHA CLASS 3: ICD-10-CM

## 2023-06-08 LAB
BH CV REST NUCLEAR ISOTOPE DOSE: 11.2 MCI
BH CV STRESS COMMENTS STAGE 1: NORMAL
BH CV STRESS DOSE REGADENOSON STAGE 1: 0.4
BH CV STRESS DURATION MIN STAGE 1: 0
BH CV STRESS DURATION SEC STAGE 1: 10
BH CV STRESS NUCLEAR ISOTOPE DOSE: 35.3 MCI
BH CV STRESS PROTOCOL 1: NORMAL
BH CV STRESS RECOVERY BP: NORMAL MMHG
BH CV STRESS RECOVERY HR: 75 BPM
BH CV STRESS STAGE 1: 1
LV EF NUC BP: 46 %
MAXIMAL PREDICTED HEART RATE: 147 BPM
PERCENT MAX PREDICTED HR: 53.06 %
STRESS BASELINE BP: NORMAL MMHG
STRESS BASELINE HR: 60 BPM
STRESS PERCENT HR: 62 %
STRESS POST PEAK BP: NORMAL MMHG
STRESS POST PEAK HR: 78 BPM
STRESS TARGET HR: 125 BPM

## 2023-06-08 PROCEDURE — A9500 TC99M SESTAMIBI: HCPCS | Performed by: INTERNAL MEDICINE

## 2023-06-08 PROCEDURE — 0 TECHNETIUM SESTAMIBI: Performed by: INTERNAL MEDICINE

## 2023-06-08 PROCEDURE — 25010000002 REGADENOSON 0.4 MG/5ML SOLUTION: Performed by: INTERNAL MEDICINE

## 2023-06-08 PROCEDURE — 78452 HT MUSCLE IMAGE SPECT MULT: CPT

## 2023-06-08 PROCEDURE — 93017 CV STRESS TEST TRACING ONLY: CPT

## 2023-06-08 RX ORDER — REGADENOSON 0.08 MG/ML
0.4 INJECTION, SOLUTION INTRAVENOUS
Status: COMPLETED | OUTPATIENT
Start: 2023-06-08 | End: 2023-06-08

## 2023-06-08 RX ADMIN — TECHNETIUM TC 99M SESTAMIBI 1 DOSE: 1 INJECTION INTRAVENOUS at 10:27

## 2023-06-08 RX ADMIN — REGADENOSON 0.4 MG: 0.08 INJECTION, SOLUTION INTRAVENOUS at 10:27

## 2023-06-08 RX ADMIN — TECHNETIUM TC 99M SESTAMIBI 1 DOSE: 1 INJECTION INTRAVENOUS at 09:24

## 2023-06-09 ENCOUNTER — TELEPHONE (OUTPATIENT)
Dept: CARDIOLOGY | Facility: CLINIC | Age: 73
End: 2023-06-09

## 2023-06-09 NOTE — TELEPHONE ENCOUNTER
Hub staff attempted to follow warm transfer process and was unsuccessful     Caller: ОЛЬГА    Relationship to patient: SELF    Best call back number: [phone]       Patient is needing: PT WOULD LIKE TO GO AHEAD AND SCHEDULE A HEART CATH SINCE HE IS STILL HAVING ISSUES. PLEASE CALL TO ADVISE. THANK YOU.

## 2023-06-12 DIAGNOSIS — I34.0 MODERATE MITRAL REGURGITATION: ICD-10-CM

## 2023-06-12 DIAGNOSIS — I50.30 CONGESTIVE HEART FAILURE WITH LV DIASTOLIC DYSFUNCTION, NYHA CLASS 3: ICD-10-CM

## 2023-06-12 DIAGNOSIS — I25.5 ISCHEMIC CARDIOMYOPATHY: ICD-10-CM

## 2023-06-12 DIAGNOSIS — I48.0 PAROXYSMAL ATRIAL FIBRILLATION: ICD-10-CM

## 2023-06-12 DIAGNOSIS — Z95.1 S/P CABG X 4: ICD-10-CM

## 2023-06-12 DIAGNOSIS — I25.718 CORONARY ARTERY DISEASE OF AUTOLOGOUS VEIN BYPASS GRAFT WITH STABLE ANGINA PECTORIS: Primary | ICD-10-CM

## 2023-06-12 DIAGNOSIS — I20.9 ANGINA PECTORIS: ICD-10-CM

## 2023-06-17 ENCOUNTER — HOSPITAL ENCOUNTER (OUTPATIENT)
Dept: GENERAL RADIOLOGY | Facility: HOSPITAL | Age: 73
Discharge: HOME | End: 2023-06-17
Payer: MEDICARE

## 2023-06-17 ENCOUNTER — LAB (OUTPATIENT)
Dept: LAB | Facility: HOSPITAL | Age: 73
End: 2023-06-17
Payer: MEDICARE

## 2023-06-17 DIAGNOSIS — Z95.1 S/P CABG X 4: ICD-10-CM

## 2023-06-17 DIAGNOSIS — I25.5 ISCHEMIC CARDIOMYOPATHY: ICD-10-CM

## 2023-06-17 DIAGNOSIS — I25.718 CORONARY ARTERY DISEASE OF AUTOLOGOUS VEIN BYPASS GRAFT WITH STABLE ANGINA PECTORIS: ICD-10-CM

## 2023-06-17 DIAGNOSIS — I50.30 CONGESTIVE HEART FAILURE WITH LV DIASTOLIC DYSFUNCTION, NYHA CLASS 3: ICD-10-CM

## 2023-06-17 DIAGNOSIS — I48.0 PAROXYSMAL ATRIAL FIBRILLATION: ICD-10-CM

## 2023-06-17 DIAGNOSIS — I34.0 MODERATE MITRAL REGURGITATION: ICD-10-CM

## 2023-06-17 DIAGNOSIS — I20.9 ANGINA PECTORIS: ICD-10-CM

## 2023-06-17 LAB
ANION GAP SERPL CALCULATED.3IONS-SCNC: 9.6 MMOL/L (ref 5–15)
BASOPHILS # BLD AUTO: 0 10*3/MM3 (ref 0–0.2)
BASOPHILS NFR BLD AUTO: 1 % (ref 0–1.5)
BUN SERPL-MCNC: 17 MG/DL (ref 8–23)
BUN/CREAT SERPL: 19.1 (ref 7–25)
CALCIUM SPEC-SCNC: 9.2 MG/DL (ref 8.6–10.5)
CHLORIDE SERPL-SCNC: 104 MMOL/L (ref 98–107)
CO2 SERPL-SCNC: 27.4 MMOL/L (ref 22–29)
CREAT SERPL-MCNC: 0.89 MG/DL (ref 0.76–1.27)
DEPRECATED RDW RBC AUTO: 48.6 FL (ref 37–54)
EGFRCR SERPLBLD CKD-EPI 2021: 90.5 ML/MIN/1.73
EOSINOPHIL # BLD AUTO: 0.1 10*3/MM3 (ref 0–0.4)
EOSINOPHIL NFR BLD AUTO: 2.2 % (ref 0.3–6.2)
ERYTHROCYTE [DISTWIDTH] IN BLOOD BY AUTOMATED COUNT: 13.5 % (ref 12.3–15.4)
GLUCOSE SERPL-MCNC: 131 MG/DL (ref 65–99)
HCT VFR BLD AUTO: 41.1 % (ref 37.5–51)
HGB BLD-MCNC: 13.8 G/DL (ref 13–17.7)
INR PPP: 1 (ref 0.93–1.1)
LYMPHOCYTES # BLD AUTO: 1.4 10*3/MM3 (ref 0.7–3.1)
LYMPHOCYTES NFR BLD AUTO: 31.8 % (ref 19.6–45.3)
MCH RBC QN AUTO: 33.6 PG (ref 26.6–33)
MCHC RBC AUTO-ENTMCNC: 33.7 G/DL (ref 31.5–35.7)
MCV RBC AUTO: 99.6 FL (ref 79–97)
MONOCYTES # BLD AUTO: 0.5 10*3/MM3 (ref 0.1–0.9)
MONOCYTES NFR BLD AUTO: 12.6 % (ref 5–12)
NEUTROPHILS NFR BLD AUTO: 2.2 10*3/MM3 (ref 1.7–7)
NEUTROPHILS NFR BLD AUTO: 52.4 % (ref 42.7–76)
NRBC BLD AUTO-RTO: 0.4 /100 WBC (ref 0–0.2)
PLATELET # BLD AUTO: 187 10*3/MM3 (ref 140–450)
PMV BLD AUTO: 7.6 FL (ref 6–12)
POTASSIUM SERPL-SCNC: 4.8 MMOL/L (ref 3.5–5.2)
PROTHROMBIN TIME: 10.7 SECONDS (ref 9.6–11.7)
RBC # BLD AUTO: 4.13 10*6/MM3 (ref 4.14–5.8)
SODIUM SERPL-SCNC: 141 MMOL/L (ref 136–145)
WBC NRBC COR # BLD: 4.3 10*3/MM3 (ref 3.4–10.8)

## 2023-06-17 PROCEDURE — 36415 COLL VENOUS BLD VENIPUNCTURE: CPT

## 2023-06-17 PROCEDURE — 85025 COMPLETE CBC W/AUTO DIFF WBC: CPT

## 2023-06-17 PROCEDURE — 80048 BASIC METABOLIC PNL TOTAL CA: CPT

## 2023-06-17 PROCEDURE — 71046 X-RAY EXAM CHEST 2 VIEWS: CPT

## 2023-06-17 PROCEDURE — 85610 PROTHROMBIN TIME: CPT

## 2023-07-27 ENCOUNTER — OFFICE VISIT (OUTPATIENT)
Dept: CARDIOLOGY | Facility: CLINIC | Age: 73
End: 2023-07-27
Payer: MEDICARE

## 2023-07-27 VITALS
BODY MASS INDEX: 27.64 KG/M2 | WEIGHT: 172 LBS | RESPIRATION RATE: 18 BRPM | DIASTOLIC BLOOD PRESSURE: 65 MMHG | HEART RATE: 74 BPM | HEIGHT: 66 IN | OXYGEN SATURATION: 97 % | SYSTOLIC BLOOD PRESSURE: 108 MMHG

## 2023-07-27 DIAGNOSIS — I25.5 ISCHEMIC CARDIOMYOPATHY: ICD-10-CM

## 2023-07-27 DIAGNOSIS — Z95.1 S/P CABG X 4: ICD-10-CM

## 2023-07-27 DIAGNOSIS — I34.0 NONRHEUMATIC MITRAL VALVE REGURGITATION: ICD-10-CM

## 2023-07-27 DIAGNOSIS — I10 PRIMARY HYPERTENSION: ICD-10-CM

## 2023-07-27 DIAGNOSIS — I48.0 PAROXYSMAL ATRIAL FIBRILLATION: ICD-10-CM

## 2023-07-27 DIAGNOSIS — E78.2 MIXED HYPERLIPIDEMIA: ICD-10-CM

## 2023-07-27 DIAGNOSIS — I34.0 MODERATE MITRAL REGURGITATION: ICD-10-CM

## 2023-07-27 DIAGNOSIS — I38 VALVULAR HEART DISEASE: Primary | ICD-10-CM

## 2023-07-27 DIAGNOSIS — I44.7 LEFT BUNDLE BRANCH BLOCK: ICD-10-CM

## 2023-07-27 RX ORDER — DAPAGLIFLOZIN 5 MG/1
TABLET, FILM COATED ORAL
COMMUNITY
Start: 2023-06-27

## 2023-07-27 NOTE — PROGRESS NOTES
Cardiology Office Visit      Encounter Date:  07/27/2023    Patient ID:   Kaveh Vences is a 72 y.o. male.    Reason For Followup:  Coronary artery disease  Hypertension  Hyperlipidemia      Brief Clinical History:  Dear Skinny Harper MD    I had the pleasure of seeing Kaveh Vences today. As you are well aware, this is a 72 y.o. male with history of diabetes,  coronary artery disease, status post previous CABG in 2012, and PCI stenting.     cardiac catheterization with graft angiography showed LV ejection fraction of 40%, severe native three-vessel coronary disease with 90% calcified stenosis involving the distal left main involving the ostium of the LAD significant 99% lesion of left circumflex coronary artery.  100% occlusion of the nondominant right coronary artery was noted as well.  [place] to the LAD was patent and saphenous vein graft to marginal branch was patent as well. Echocardiogram with LV ejection fraction of 45% with moderate mitral regurgitation      Interval History:  Denies any new cardiac symptoms  Here for follow-up after recent hospitalization cardiac catheterization    Interpretation Summary    Extended Holter monitor for evaluation of palpitations  Patient was monitored from August 4, 2022 to September 5, 2022  Underlying rhythm is sinus rhythm with a minimal heart rate of 50 bpm and maximal heart rate of 120 bpm average heart rate of 63 bpm  No atrial fibrillation  No sustained ventricular or supraventricular tach arrhythmia  First-degree AV block  No clinically significant pauses  PVC burden of 5%  PAC burden of 1%  Relatively benign study  Clinical correlation is recommended    Interpretation Summary    Left ventricular wall thickness is consistent with mild concentric hypertrophy.  Estimated left ventricular EF = 60% Left ventricular systolic function is normal.  There is calcification of the aortic valve mainly affecting the left coronary cusp(s).  Left ventricular diastolic function is  consistent with (grade I) impaired relaxation.  Mild dilation of the aortic root is present.  Estimated right ventricular systolic pressure from tricuspid regurgitation is normal (<35 mmHg).    Interpretation Summary    Myocardial perfusion study did not show any evidence of any significant reversible ischemia  Myocardial perfusion study shows moderate size severe intensity mostly fixed perfusion defect involving the anterior anteroapical wall and anteroseptal wall likely secondary to underlying attenuation artifact from left bundle branch block  Left ventricular ejection fraction is normal. (Calculated EF = 60%).  Impressions are consistent with an intermediate risk study.  Clinical correlation is recommended        Assessment & Plan    Impressions:  Coronary artery disease status post coronary artery bypass surgery  Hypertension  Hyperlipidemia  Sinus bradycardia with a left bundle branch block  Peripheral vascular disease/altered moderate obstructive carotid artery disease  Mild to moderate mitral regurgitation  Diabetes mellitus/most recent hemoglobin A1c was optimal  Postop atrial fibrillation currently maintaining sinus rhythm  Bilateral carotid stenosis in the moderate range  Intermittent asymptomatic bradycardia and also intermittent tachycardia  Stable angina  Mild bilateral carotid stenosis  Medical management for coronary artery disease recent cath films reviewed and discussed with patient    Recommendations:    Good functional capacity  Discontinue metoprolol  Discontinue amlodipine  Close monitoring of heart rate and blood pressure at home  Monitoring of heart rate on Apple Watch  Continued aggressive risk factor modification  Continue current medical therapy with aspirin 81 mg p.o. once a day Plavix 75 mg p.o. once a day  Lipitor 20 mg p.o. once a day lisinopril 20 mg p.o. once a day isosorbide 30 mg p.o. once a day Ranexa 1000 mg p.o. twice daily  Heart rate has improved with discontinuing  beta-blocker      Follow-up in office 6 months            Lab Results   Component Value Date    GLUCOSE 131 (H) 06/17/2023    BUN 17 06/17/2023    CREATININE 0.89 06/17/2023    EGFR 90.5 06/17/2023    BCR 19.1 06/17/2023    K 4.8 06/17/2023    CO2 27.4 06/17/2023    CALCIUM 9.2 06/17/2023    ALBUMIN 4.10 08/03/2022    BILITOT 0.6 08/03/2022    AST 11 08/03/2022    ALT 9 08/03/2022     Results for orders placed during the hospital encounter of 08/03/22    Adult Transthoracic Echo Complete W/ Cont if Necessary Per Protocol    Interpretation Summary  · Left ventricular wall thickness is consistent with mild concentric hypertrophy.  · Estimated left ventricular EF = 60% Left ventricular systolic function is normal.  · There is calcification of the aortic valve mainly affecting the left coronary cusp(s).  · Left ventricular diastolic function is consistent with (grade I) impaired relaxation.  · Mild dilation of the aortic root is present.  · Estimated right ventricular systolic pressure from tricuspid regurgitation is normal (<35 mmHg).     Results for orders placed during the hospital encounter of 06/19/23    Cardiac Catheterization/Vascular Study    Narrative  Table formatting from the original result was not included.  Cardiac Catheterization Operative Report    Kaveh Vences  [id number]  6/19/2023  @PCP@    He underwent cardiac catheterization.    Indications for the procedure include: chest pain.    Procedure Details:  The risks, benefits, complications, treatment options, and expected outcomes were discussed with the patient. The patient and/or family concurred with the proposed plan, giving informed consent.    After informed consent the patient was brought to the cath lab after appropriate IV hydration was begun and oral premedication was given. He was further sedated with fentanyl. He was prepped and draped in the usual manner. Using the modified Seldinger access technique, a 6 Kiswahili sheath was placed in the  femoral artery. A left heart catheterization with coronary arteriography was performed. Findings are discussed below.  Same JR4 catheter and also 5 Indonesian IMT and IM catheters and also 5 Indonesian AL-1 diagnostic catheter were used for selective engagement of the vein graft to the marginal and LIMA to the LAD.    After the procedure was completed, sedation was stopped and the sheaths and catheters were all removed. Hemostasis was achieved per established hospital protocols.    Conscious sedation:  Conscious sedation was performed according to protocol.  I supervised and directed an independent trained observer with the assistance of monitoring the patient's level of consciousness and physiologic status throughout the procedure.  Intraoperative service time was 90 minutes.    Findings:    Hemodynamics Central aortic pressure systolic 125 diastolic 65 with a mean pressure of 90 mmHg  LV end-diastolic pressure of 8 mmHg  There was no gradient across the aortic valve on the pullback of the pigtail catheter  Left Main Left main is 100% occluded in the midportion  RCA Right coronary artery is a nondominant mid diffuse 90% stenosis angiographically unchanged from before  % ostial occlusion  Circ 100% ostial occlusion  SVG(s) Vein graft to marginal is patent with no significant stenosis involving the ostium of the anastomotic site  Distal portion of the body of this vein graft has angiographic focal area of 50% stenosis  This vein graft supplies the marginal branch that retrogradely fills the rest of the left circumflex system  HELENE Patent LIMA to the LAD without any significant stenosis involving the ostium/body at the anastomotic site  LAD retrogradely fills about the diagonal branches  LV Not done  Coronary Dominance Right coronary artery    Estimated Blood Loss:  Minimal    Specimens: None    Complications:  None; patient tolerated the procedure well.    Disposition: PACU - hemodynamically stable.    Condition:  stable    Impressions:  Severe native three-vessel coronary artery disease  100% occlusion of the left main  Diffuse 90% stenosis involving the proximal right coronary artery that is nondominant  Patent LIMA to the LAD  Patent vein graft to the marginal  Moderate obstructive disease involving the vein graft to the marginal  Normal left-sided filling pressures    Recommendations:  Medical management for obstructive coronary artery disease  Test results reviewed and discussed the patient and family     No results found for: CHOL, CHLPL, TRIG, HDL, LDL, LDLDIRECT   Results for orders placed during the hospital encounter of 06/08/23    Stress Test With Myocardial Perfusion One Day    Interpretation Summary    Lexiscan myocardial perfusion study shows mostly fixed perfusion defect involving the anterior anteroseptal anteroapical wall consistent with underlying left bundle branch block during wall motion abnormalities and a perfusion defect than a true reversible ischemia    Left ventricular ejection fraction is mildly reduced (Calculated EF = 46%).    Impressions are consistent with an intermediate risk study.    Clinical correlation is recommended   Results for orders placed during the hospital encounter of 06/19/23    Mobile Cardiac Outpatient Telemetry    Interpretation Summary  Extended Holter monitor study for symptoms of palpitations  Patient was monitored from a June 20, 2023 to July 19, 2023  Underlying rhythm is sinus rhythm with a minimal heart rate of 50 bpm maximal heart rate of 132 bpm average heart rate of 72 bpm  No atrial fibrillation  No sustained ventricular or supraventricular tachyarrhythmia  No clinically significant pauses  No AV block  Relatively benign study  Clinical correlation is recommended           Objective:    Vitals:  Vitals:    07/27/23 1251   BP: 108/65   BP Location: Left arm   Patient Position: Sitting   Cuff Size: Large Adult   Pulse: 74   Resp: 18   SpO2: 97%   Weight: 78 kg (172  "lb)   Height: 167.6 cm (66\")       Physical Exam:    General: Alert, cooperative, no distress, appears stated age  Head:  Normocephalic, atraumatic, mucous membranes moist  Eyes:  Conjunctiva/corneas clear, EOM's intact     Neck:  Supple,  no adenopathy;      Lungs: Clear to auscultation bilaterally, no wheezes rhonchi rales are noted  Chest wall: No tenderness  Heart::  Regular rate and rhythm, S1 and S2 normal, no murmur, rub or gallop  Abdomen: Soft, non-tender, nondistended bowel sounds active  Extremities: No cyanosis, clubbing, or edema  Pulses: 2+ and symmetric all extremities  Skin:  No rashes or lesions  Neuro/psych: A&O x3. CN II through XII are grossly intact with appropriate affect      Allergies:  No Known Allergies    Medication Review:     Current Outpatient Medications:     amLODIPine (NORVASC) 5 MG tablet, Take 1 tablet by mouth Daily., Disp: 30 tablet, Rfl: 2    aspirin 81 MG EC tablet, Take 1 tablet by mouth Daily., Disp: , Rfl:     atorvastatin (LIPITOR) 20 MG tablet, Take 1 tablet by mouth once daily, Disp: 90 tablet, Rfl: 0    clopidogrel (PLAVIX) 75 MG tablet, Take 1 tablet by mouth once daily, Disp: 90 tablet, Rfl: 0    Farxiga 5 MG tablet tablet, , Disp: , Rfl:     isosorbide mononitrate (IMDUR) 30 MG 24 hr tablet, Take 1 tablet by mouth once daily, Disp: 90 tablet, Rfl: 0    lisinopril (PRINIVIL,ZESTRIL) 40 MG tablet, Take 1 tablet by mouth Daily., Disp: 90 tablet, Rfl: 3    metFORMIN ER (GLUCOPHAGE-XR) 500 MG 24 hr tablet, TAKE 2 TABLETS BY MOUTH TWICE DAILY FOR 30 DAYS, Disp: , Rfl:     nitroglycerin (NITROSTAT) 0.4 MG SL tablet, Place 1 tablet under the tongue Every 5 (Five) Minutes As Needed for Chest Pain. Take no more than 3 doses in 15 minutes., Disp: 30 tablet, Rfl: 4    ranolazine (RANEXA) 1000 MG 12 hr tablet, TAKE 1 TABLET BY MOUTH EVERY 12 HOURS, Disp: 180 tablet, Rfl: 0    tiZANidine (ZANAFLEX) 4 MG tablet, Take 1 tablet by mouth Every 8 (Eight) Hours As Needed for Muscle " Spasms., Disp: 90 tablet, Rfl: 5    Family History:  Family History   Problem Relation Age of Onset    Coronary artery disease Father     Coronary artery disease Brother     Heart attack Brother     Stroke Brother     Diabetes Maternal Uncle        Past Medical History:  Past Medical History:   Diagnosis Date    Atrial fibrillation     Coronary artery disease     Diabetes mellitus     Gallstones     Hyperlipidemia     Hypertension     Injury of back     Low back pain     Mitral regurgitation     Neck pain     Rheumatoid arthritis     Sleep apnea     Valvular disease        Past surgical History:  Past Surgical History:   Procedure Laterality Date    BACK SURGERY  01/2019    CARDIAC CATHETERIZATION  2006, 2009, 2012, 2018    CARDIAC CATHETERIZATION Right 6/19/2023    Procedure: Left Heart Cath;  Surgeon: Cris Mcneill MD;  Location: [facility] CATH INVASIVE LOCATION;  Service: Cardiovascular;  Laterality: Right;    CHOLECYSTECTOMY  12/22/2015    CORONARY ANGIOPLASTY  2006, 2009    CORONARY ARTERY BYPASS GRAFT  05/11/2012    x2    ROTATOR CUFF REPAIR Left        Social History:  Social History     Socioeconomic History    Marital status:    Tobacco Use    Smoking status: Never    Smokeless tobacco: Never   Vaping Use    Vaping Use: Never used   Substance and Sexual Activity    Alcohol use: No    Drug use: No    Sexual activity: Defer       Review of Systems:  The following systems were reviewed as they relate to the cardiovascular system: Constitutional, Eyes, ENT, Cardiovascular, Respiratory, Gastrointestinal, Integumentary, Neurological, Psychiatric, Hematologic, Endocrine, Musculoskeletal, and Genitourinary. The pertinent cardiovascular findings are reported above with all other cardiovascular points within those systems being negative.    Diagnostic Study Review:     Current Electrocardiogram:    ECG 12 Lead    Date/Time: 7/27/2023 5:10 PM  Performed by: Cris Mcneill MD  Authorized by:  Cris Mcneill MD   Comparison: compared with previous ECG   Similar to previous ECG  Rhythm: sinus rhythm  Rate: normal  BPM: 74  Conduction: left bundle branch block  QRS axis: normal    Clinical impression: abnormal EKG          Advance Care Planning   ACP discussion was held with the patient during this visit. Patient has an advance directive in EMR which is still valid.         NOTE: The following portions of the patient's history were reviewed and updated this visit as appropriate: allergies, current medications, past family history, past medical history, past social history, past surgical history and problem list.

## 2023-08-21 RX ORDER — ISOSORBIDE MONONITRATE 30 MG/1
TABLET, EXTENDED RELEASE ORAL
Qty: 90 TABLET | Refills: 0 | Status: SHIPPED | OUTPATIENT
Start: 2023-08-21

## 2023-09-08 ENCOUNTER — TELEPHONE (OUTPATIENT)
Dept: CARDIOLOGY | Facility: CLINIC | Age: 73
End: 2023-09-08

## 2023-09-08 NOTE — TELEPHONE ENCOUNTER
Caller: DR SY [facility]ICE    Relationship to patient:     Best call back number: [phone]    Patient is needing: RONNIE AT DR SY CAMPBELL[facility] OFFICE REQUESTING LAST OFFICE NOTE . PER PATIENT THEY WERE TOLD THAT DR GONZALEZ IS WANTING PATIENT TO TAKE 10 MG OF FARXIGA AND DR GONZALEZ TOLD HIM TO HAVE DR CAMPBELL'[facility] OFFICE FILL THAT PRESCRIPTION. LAST OFFICE NOTE 7. 27.23 WITH DR GONZALEZ PATIENT IS STILL LISTED AS 5MG OF FARXIGA. DR CAMPBELL'S [phone]

## 2023-09-13 ENCOUNTER — TELEPHONE (OUTPATIENT)
Dept: CARDIOLOGY | Facility: CLINIC | Age: 73
End: 2023-09-13
Payer: MEDICARE

## 2023-09-13 NOTE — TELEPHONE ENCOUNTER
----- Message from Cris Mcneill MD sent at 9/13/2023  3:01 PM EDT -----  To get heart benefits the corrected dose is 10 mg p.o. once a day  If Dr. Duval she is agreeable he can increase the dose to 10 mg p.o. once a day and monitor the hemoglobin A1c  If not they can send the labs or check a hemoglobin A1c and I can adjust the medicine  ----- Message -----  From: Aniya Vital MA  Sent: 9/13/2023   1:39 PM EDT  To: Cris Mcneill MD    Pt called stating you had advised him to increase his Farxiga to 10 mg, but to have Dr Greene fill it. They are wanting verification. Does pt need to increase to 10 mg?

## 2023-10-16 RX ORDER — CLOPIDOGREL BISULFATE 75 MG/1
TABLET ORAL
Qty: 90 TABLET | Refills: 1 | Status: SHIPPED | OUTPATIENT
Start: 2023-10-16

## 2023-10-16 RX ORDER — ATORVASTATIN CALCIUM 20 MG/1
TABLET, FILM COATED ORAL
Qty: 90 TABLET | Refills: 1 | Status: SHIPPED | OUTPATIENT
Start: 2023-10-16

## 2023-10-19 RX ORDER — RANOLAZINE 1000 MG/1
TABLET, EXTENDED RELEASE ORAL
Qty: 180 TABLET | Refills: 1 | Status: SHIPPED | OUTPATIENT
Start: 2023-10-19

## 2023-11-13 RX ORDER — AMLODIPINE BESYLATE 5 MG/1
5 TABLET ORAL DAILY
Qty: 90 TABLET | Refills: 2 | Status: SHIPPED | OUTPATIENT
Start: 2023-11-13

## 2023-11-13 RX ORDER — ISOSORBIDE MONONITRATE 30 MG/1
TABLET, EXTENDED RELEASE ORAL
Qty: 90 TABLET | Refills: 2 | Status: SHIPPED | OUTPATIENT
Start: 2023-11-13

## 2023-12-21 ENCOUNTER — OFFICE VISIT (OUTPATIENT)
Dept: PAIN MEDICINE | Facility: CLINIC | Age: 73
End: 2023-12-21
Payer: MEDICARE

## 2023-12-21 VITALS
OXYGEN SATURATION: 96 % | DIASTOLIC BLOOD PRESSURE: 87 MMHG | WEIGHT: 173 LBS | SYSTOLIC BLOOD PRESSURE: 152 MMHG | HEART RATE: 65 BPM | BODY MASS INDEX: 27.92 KG/M2 | RESPIRATION RATE: 16 BRPM

## 2023-12-21 DIAGNOSIS — M47.812 CERVICAL SPONDYLOSIS WITHOUT MYELOPATHY: ICD-10-CM

## 2023-12-21 DIAGNOSIS — M47.817 LUMBOSACRAL SPONDYLOSIS WITHOUT MYELOPATHY: ICD-10-CM

## 2023-12-21 DIAGNOSIS — G89.4 CHRONIC PAIN SYNDROME: Primary | ICD-10-CM

## 2023-12-21 DIAGNOSIS — M96.1 POSTLAMINECTOMY SYNDROME OF LUMBAR REGION: ICD-10-CM

## 2023-12-21 DIAGNOSIS — M25.50 POLYARTHRALGIA: ICD-10-CM

## 2023-12-21 RX ORDER — TIZANIDINE 4 MG/1
4 TABLET ORAL EVERY 8 HOURS PRN
Qty: 90 TABLET | Refills: 5 | Status: SHIPPED | OUTPATIENT
Start: 2023-12-21

## 2023-12-21 RX ORDER — DONEPEZIL HYDROCHLORIDE 5 MG/1
1 TABLET, FILM COATED ORAL DAILY
COMMUNITY
Start: 2023-10-28

## 2023-12-21 NOTE — PROGRESS NOTES
Subjective    CC Back pain  Kaveh Vences is a 72 y.o. male with chronic back pain history of decompression/L2-3 microdiscectomy in 2019/Dr. Brandon, here for f/u.  Complains of slightly worsening axial low back pain, interfering with work and sleep.  He has had 70% relief with lumbar RFA lasted over 6 months.  Symptoms are gradually returning.    Chronic axial back pain radiating to bilateral hip without radicular pain worse with standing walking or prolonged activity.  Denies weakness, saddle anesthesia bladder bowel continence.  Chronic neck pain radiating to head and bilateral shoulders worse at night and associated with muscle spasm headaches.  Denies weakness, balance issues, bladder bowel continence.    Tried physical therapy and medication with marginal relief.  Interfering with ADL and sleep.   Seen neurosurgery, no surgery recommended.    C-spine MRI  Multilevel degenerative disc disease most notably at C5-C6 and C6-C7 with mild spinal canal stenosis. Moderate right-sided neuroforaminal narrowing at C6-C7 with mild left-sided neuroforaminal narrowing.  L-spine x-ray 2020 Good range of motion with flexion and extension.  No evidence of subluxation. Persistent degenerative disc space narrowing at the L3/4 and L5/L4vhguac, unchanged from prior study.   L-spine MRI 2018 degenerative disc disease and mild facet degenerative change. Findings are most pronounced at L2-L3 were is moderate to severe neural foramen narrowing on the left due to the large focal disc extrusion.  mild neural foramen narrowing on the right at the L2-L3 level due to a smaller focal extrusion    Pain Assessment   Location of Pain: Lower Back  Description of Pain: Dull/Aching, Throbbing, Stabbing  Previous Pain Rating :2  Current Pain Ratin  Aggravating Factors: Activity  Alleviating Factors: Rest, Medication    PEG Assessment   What number best describes your pain on average in the past week?1  What number best describes how,  during the past week, pain has interfered with your enjoyment of life?1  What number best describes how, during the past week, pain has interfered with your general activity?8    The following portions of the patient's history were reviewed and updated as appropriate: allergies, current medications, past family history, past medical history, past social history, past surgical history and problem list.     has a past medical history of Atrial fibrillation, Coronary artery disease, Diabetes mellitus, Gallstones, Hyperlipidemia, Hypertension, Injury of back, Low back pain, Mitral regurgitation, Neck pain, Rheumatoid arthritis, Sleep apnea, and Valvular disease.   has a past surgical history that includes Coronary artery bypass graft (05/11/2012); Coronary angioplasty (2006, 2009); Rotator cuff repair (Left); Cholecystectomy (12/22/2015); Cardiac catheterization (2006, 2009, 2012, 2018); Back surgery (01/2019); and Cardiac catheterization (Right, 6/19/2023).  family history includes Coronary artery disease in his brother and father; Diabetes in his maternal uncle; Heart attack in his brother; Stroke in his brother.      Review of Systems   Musculoskeletal:  Positive for arthralgias, back pain, myalgias, neck pain and neck stiffness.   All other systems reviewed and are negative.      Objective   Physical Exam   Constitutional: No distress.   Pulmonary/Chest: Effort normal.   Musculoskeletal:      Cervical back: He exhibits tenderness.   Vitals reviewed.    /87   Pulse 65   Resp 16   Wt 78.5 kg (173 lb)   SpO2 96%   BMI 27.92 kg/m²      PHQ 9 on chart  Opioid risk tool low risk    Assessment & Plan   Diagnoses and all orders for this visit:    1. Chronic pain syndrome (Primary)  -     tiZANidine (ZANAFLEX) 4 MG tablet; Take 1 tablet by mouth Every 8 (Eight) Hours As Needed for Muscle Spasms.  Dispense: 90 tablet; Refill: 5    2. Lumbosacral spondylosis without myelopathy  -     Facet    3. Cervical spondylosis  without myelopathy    4. Postlaminectomy syndrome of lumbar region    5. Polyarthralgia    Summary  Kaveh Vences is a 72 y.o. male with chronic back pain history of decompression/L2-3 microdiscectomy in January 2019/Dr. Brandon, chronic neck pain here for f/u.   Chronic neck pain from DDD spondylosis.  Chronic back pain from postlaminectomy syndrome and chronic polyarthralgia.  Repeat lumbar RFA or cervical CLARENCE as needed.    Complains of slightly worsening axial low back pain, interfering with work and sleep.  He has had 70% relief with lumbar RFA lasted over 6 months.  Symptoms are gradually returning.  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  Will schedule for repeat bilateral lumbar RFA at L4/5, L5/S1 without sedation.  Risks and benefits discussed.    Continue  Zanaflex at as needed bedtime.    RTC  for procedure or in 6 months

## 2024-02-01 NOTE — PROGRESS NOTES
Cardiology Office Visit      Encounter Date:  01/12/2022    Patient ID:   Kaveh Vences is a 71 y.o. male.    Reason For Followup:  Coronary artery disease  Hypertension  Hyperlipidemia      Brief Clinical History:  Dear Skinny Harper MD    I had the pleasure of seeing Kaveh Vences today. As you are well aware, this is a 71 y.o. male with history of diabetes,  coronary artery disease, status post previous CABG in 2012, and PCI stenting.     cardiac catheterization with graft angiography showed LV ejection fraction of 40%, severe native three-vessel coronary disease with 90% calcified stenosis involving the distal left main involving the ostium of the LAD significant 99% lesion of left circumflex coronary artery.  100% occlusion of the nondominant right coronary artery was noted as well.  [place] to the LAD was patent and saphenous vein graft to marginal branch was patent as well. Echocardiogram with LV ejection fraction of 45% with moderate mitral regurgitation      Interval History:  Denies any new complaints  No further chest pain  No unstable symptoms  Dizziness and shortness of breath have improved  No syncope  No significant change in the overall symptoms  Anginal symptoms are stable    Interpretation Summary    · Left ventricular wall thickness is consistent with moderate asymmetric hypertrophy.  · Estimated left ventricular EF = 50% Estimated left ventricular EF was in agreement with the calculated left ventricular EF. Left ventricular systolic function is normal.  · Estimated right ventricular systolic pressure from tricuspid regurgitation is normal (<35 mmHg).  · Moderate dilation of the aortic root is present. Moderate dilation of the ascending aorta is present.  · Left ventricular diastolic function is consistent with (grade I) impaired relaxation.        Interpretation Summary    · Mid right internal carotid artery mild stenosis.  · Mid left internal carotid artery mild stenosis.  · Study Impression: •  Right ICA Mid: Imaging indicates 16%-49% stenosis. • Left ICA Mid: Imaging indicates 16-49% stenosis.           Assessment & Plan    Impressions:  Coronary artery disease status post coronary artery bypass surgery  Hypertension  Hyperlipidemia  Sinus bradycardia with a left bundle branch block  Peripheral vascular disease/altered moderate obstructive carotid artery disease  Mild to moderate mitral regurgitation  Diabetes mellitus/most recent hemoglobin A1c was optimal  Postop atrial fibrillation currently maintaining sinus rhythm  Bilateral carotid stenosis in the moderate range  Intermittent asymptomatic bradycardia and also intermittent tachycardia  Stable angina    Recommendations:  Recent evaluation at [facility] with a repeat cardiac catheterization with no significant change in the anatomy compared to before opted for medical therapy  Repeat echocardiogram with only mild to moderate mitral regurgitation somewhat better from before  Continue Ranexa 1000 mg at night  Good functional capacity  Cardiogram discussed with the patient  Labs with the primary care physician office  Plan treat patient conservatively from cardiac standpoint  Continue current medical therapy  Patient is already on maximal medical therapy  Continue close monitoring  Results reviewed and discussed with patient  Labs reviewed and discussed with the patient  Labs with primary care physician office  Consider repeat carotid ultrasound next year  Bilateral carotid stenosis 16 to 49% range  Recent labs that was done with the primary care physician office reviewed total cholesterol of 138 triglycerides 116 HDL 46 LDL cholesterol optimal at 71  Continue current medical therapy continued aggressive risk factor modification  Ultra monitor results reviewed and discussed with patient patient has some asymptomatic sinus bradycardia and also intermittent increased heart rate so plan is at least for now continue current dose of beta-blockers unless  "patient is symptomatic  Follow-up in office 6 months    Objective:    Vitals:  Vitals:    01/12/22 0847   BP: 127/73   BP Location: Left arm   Patient Position: Sitting   Cuff Size: Large Adult   Pulse: 50   Resp: 18   SpO2: 95%   Weight: 84.8 kg (187 lb)   Height: 170.2 cm (67\")       Physical Exam:    General: Alert, cooperative, no distress, appears stated age  Head:  Normocephalic, atraumatic, mucous membranes moist  Eyes:  Conjunctiva/corneas clear, EOM's intact     Neck:  Supple,  no adenopathy;      Lungs: Clear to auscultation bilaterally, no wheezes rhonchi rales are noted  Chest wall: No tenderness  Heart::  Regular rate and rhythm, S1 and S2 normal, no murmur, rub or gallop  Abdomen: Soft, non-tender, nondistended bowel sounds active  Extremities: No cyanosis, clubbing, or edema  Pulses: 2+ and symmetric all extremities  Skin:  No rashes or lesions  Neuro/psych: A&O x3. CN II through XII are grossly intact with appropriate affect      Allergies:  No Known Allergies    Medication Review:     Current Outpatient Medications:   •  amLODIPine (NORVASC) 5 MG tablet, Take 1 tablet by mouth once daily, Disp: 90 tablet, Rfl: 2  •  aspirin 81 MG EC tablet, Take 81 mg by mouth Daily., Disp: , Rfl:   •  atorvastatin (LIPITOR) 20 MG tablet, Take 1 tablet by mouth Daily., Disp: 90 tablet, Rfl: 3  •  clopidogrel (PLAVIX) 75 MG tablet, Take 1 tablet by mouth Daily., Disp: 90 tablet, Rfl: 3  •  isosorbide mononitrate (IMDUR) 30 MG 24 hr tablet, Take 1 tablet by mouth Daily., Disp: 90 tablet, Rfl: 3  •  lisinopril (PRINIVIL,ZESTRIL) 20 MG tablet, Take 1 tablet by mouth 2 (Two) Times a Day., Disp: 180 tablet, Rfl: 3  •  metFORMIN ER (GLUCOPHAGE-XR) 500 MG 24 hr tablet, TAKE 2 TABLETS BY MOUTH TWICE DAILY FOR 30 DAYS, Disp: , Rfl:   •  metoprolol tartrate (LOPRESSOR) 50 MG tablet, Take 1 tablet by mouth 2 (Two) Times a Day., Disp: 180 tablet, Rfl: 3  •  nitroglycerin (NITROSTAT) 0.4 MG SL tablet, Place 1 tablet under the " tongue Every 5 (Five) Minutes As Needed for Chest Pain. Take no more than 3 doses in 15 minutes., Disp: 30 tablet, Rfl: 4  •  ranolazine (Ranexa) 1000 MG 12 hr tablet, Take 1 tablet by mouth Every 12 (Twelve) Hours., Disp: 180 tablet, Rfl: 3  •  tiZANidine (ZANAFLEX) 4 MG tablet, Take 1 tablet by mouth 2 (Two) Times a Day As Needed for Muscle Spasms., Disp: 60 tablet, Rfl: 1    Family History:  Family History   Problem Relation Age of Onset   • Coronary artery disease Father    • Coronary artery disease Brother    • Heart attack Brother    • Stroke Brother    • Diabetes Maternal Uncle        Past Medical History:  Past Medical History:   Diagnosis Date   • Atrial fibrillation (HCC)    • Coronary artery disease    • Diabetes mellitus (HCC)    • Gallstones    • Hyperlipidemia    • Hypertension    • Injury of back    • Low back pain    • Mitral regurgitation    • Rheumatoid arthritis (HCC)    • Sleep apnea    • Valvular disease        Past surgical History:  Past Surgical History:   Procedure Laterality Date   • BACK SURGERY  01/2019   • CARDIAC CATHETERIZATION  2006, 2009, 2012, 2018   • CHOLECYSTECTOMY  12/22/2015   • CORONARY ANGIOPLASTY  2006, 2009   • CORONARY ARTERY BYPASS GRAFT  05/11/2012    x2   • ROTATOR CUFF REPAIR Left        Social History:  Social History     Socioeconomic History   • Marital status:    Tobacco Use   • Smoking status: Never Smoker   • Smokeless tobacco: Never Used   Vaping Use   • Vaping Use: Never used   Substance and Sexual Activity   • Alcohol use: No   • Drug use: No   • Sexual activity: Defer       Review of Systems:  The following systems were reviewed as they relate to the cardiovascular system: Constitutional, Eyes, ENT, Cardiovascular, Respiratory, Gastrointestinal, Integumentary, Neurological, Psychiatric, Hematologic, Endocrine, Musculoskeletal, and Genitourinary. The pertinent cardiovascular findings are reported above with all other cardiovascular points within those  systems being negative.    Diagnostic Study Review:     Current Electrocardiogram:    ECG 12 Lead    Date/Time: 1/12/2022 9:06 AM  Performed by: Cris Mcneill MD  Authorized by: Cris Mcneill MD   Comparison: compared with previous ECG   Similar to previous ECG  Rhythm: sinus rhythm and sinus bradycardia  Rate: normal  BPM: 52  Conduction: non-specific intraventricular conduction delay  QRS axis: normal  Other findings: non-specific ST-T wave changes    Clinical impression: abnormal EKG              NOTE: The following portions of the patient's history were reviewed and updated this visit as appropriate: allergies, current medications, past family history, past medical history, past social history, past surgical history and problem list.   Yes

## 2024-02-06 ENCOUNTER — OFFICE VISIT (OUTPATIENT)
Dept: CARDIOLOGY | Facility: CLINIC | Age: 74
End: 2024-02-06
Payer: MEDICARE

## 2024-02-06 VITALS
HEART RATE: 69 BPM | WEIGHT: 176 LBS | DIASTOLIC BLOOD PRESSURE: 60 MMHG | SYSTOLIC BLOOD PRESSURE: 106 MMHG | HEIGHT: 66 IN | OXYGEN SATURATION: 95 % | BODY MASS INDEX: 28.28 KG/M2

## 2024-02-06 DIAGNOSIS — I38 VALVULAR HEART DISEASE: ICD-10-CM

## 2024-02-06 DIAGNOSIS — I34.0 MODERATE MITRAL REGURGITATION: ICD-10-CM

## 2024-02-06 DIAGNOSIS — I44.7 LEFT BUNDLE BRANCH BLOCK: ICD-10-CM

## 2024-02-06 DIAGNOSIS — M94.9 DISORDER OF CARTILAGE, UNSPECIFIED: ICD-10-CM

## 2024-02-06 DIAGNOSIS — I34.0 NONRHEUMATIC MITRAL VALVE REGURGITATION: ICD-10-CM

## 2024-02-06 DIAGNOSIS — I25.5 ISCHEMIC CARDIOMYOPATHY: ICD-10-CM

## 2024-02-06 DIAGNOSIS — Z95.1 S/P CABG X 4: Primary | ICD-10-CM

## 2024-02-06 DIAGNOSIS — R53.83 OTHER FATIGUE: ICD-10-CM

## 2024-02-06 DIAGNOSIS — I48.0 PAROXYSMAL ATRIAL FIBRILLATION: ICD-10-CM

## 2024-02-06 RX ORDER — NITROGLYCERIN 0.4 MG/1
0.4 TABLET SUBLINGUAL
Qty: 25 TABLET | Refills: 2 | Status: SHIPPED | OUTPATIENT
Start: 2024-02-06

## 2024-02-06 NOTE — PROGRESS NOTES
Cardiology Office Visit      Encounter Date:  02/06/2024    Patient ID:   Kaveh Vences is a 73 y.o. male.    Reason For Followup:  Coronary artery disease  Hypertension  Hyperlipidemia      Brief Clinical History:  Dear Skinny Harper MD    I had the pleasure of seeing Kaveh Vences today. As you are well aware, this is a 73 y.o. male with history of diabetes,  coronary artery disease, status post previous CABG in 2012, and PCI stenting.     cardiac catheterization with graft angiography showed LV ejection fraction of 40%, severe native three-vessel coronary disease with 90% calcified stenosis involving the distal left main involving the ostium of the LAD significant 99% lesion of left circumflex coronary artery.  100% occlusion of the nondominant right coronary artery was noted as well.  Ash[place] to the LAD was patent and saphenous vein graft to marginal branch was patent as well. Echocardiogram with LV ejection fraction of 45% with moderate mitral regurgitation      Interval History:  Denies any new cardiac symptoms  Denies any dizziness or syncope  Complaining of significant fatigue  Blood pressure is somewhat soft    Interpretation Summary    Extended Holter monitor for evaluation of palpitations  Patient was monitored from August 4, 2022 to September 5, 2022  Underlying rhythm is sinus rhythm with a minimal heart rate of 50 bpm and maximal heart rate of 120 bpm average heart rate of 63 bpm  No atrial fibrillation  No sustained ventricular or supraventricular tach arrhythmia  First-degree AV block  No clinically significant pauses  PVC burden of 5%  PAC burden of 1%  Relatively benign study  Clinical correlation is recommended    Interpretation Summary    Left ventricular wall thickness is consistent with mild concentric hypertrophy.  Estimated left ventricular EF = 60% Left ventricular systolic function is normal.  There is calcification of the aortic valve mainly affecting the left coronary cusp(s).  Left  ventricular diastolic function is consistent with (grade I) impaired relaxation.  Mild dilation of the aortic root is present.  Estimated right ventricular systolic pressure from tricuspid regurgitation is normal (<35 mmHg).    Interpretation Summary    Myocardial perfusion study did not show any evidence of any significant reversible ischemia  Myocardial perfusion study shows moderate size severe intensity mostly fixed perfusion defect involving the anterior anteroapical wall and anteroseptal wall likely secondary to underlying attenuation artifact from left bundle branch block  Left ventricular ejection fraction is normal. (Calculated EF = 60%).  Impressions are consistent with an intermediate risk study.  Clinical correlation is recommended        Assessment & Plan    Impressions:  Coronary artery disease status post coronary artery bypass surgery  Hypertension  Hyperlipidemia  Sinus bradycardia with a left bundle branch block  Peripheral vascular disease/altered moderate obstructive carotid artery disease  Mild to moderate mitral regurgitation  Diabetes mellitus/most recent hemoglobin A1c was optimal  Postop atrial fibrillation currently maintaining sinus rhythm  Bilateral carotid stenosis in the moderate range  Intermittent asymptomatic bradycardia and also intermittent tachycardia  Stable angina  Mild bilateral carotid stenosis  Medical management for coronary artery disease recent cath films reviewed and discussed with patient    Recommendations:    Good functional capacity  Discontinue metoprolol  Close monitoring of heart rate and blood pressure at home  Monitoring of heart rate on Apple Watch  Continued aggressive risk factor modification  Continue current medical therapy with aspirin 81 mg p.o. once a day Plavix 75 mg p.o. once a day  Lipitor 20 mg p.o. once a day lisinopril 20 mg p.o. once a day isosorbide 30 mg p.o. once a day Ranexa 1000 mg p.o. twice daily  Heart rate has improved with discontinuing  "beta-blocker  Close monitoring of blood pressure at home  If the blood pressure is low consider decreasing the dose of Norvasc  Check labs including TSH vitamin D level B12 folate and also testosterone level and follow-up with primary care physician for the fatigue      Follow-up in office 6 months        Vitals:  Vitals:    02/06/24 1242   BP: 106/60   BP Location: Left arm   Pulse: 69   SpO2: 95%   Weight: 79.8 kg (176 lb)   Height: 167.6 cm (66\")       Physical Exam:    General: Alert, cooperative, no distress, appears stated age  Head:  Normocephalic, atraumatic, mucous membranes moist  Eyes:  Conjunctiva/corneas clear, EOM's intact     Neck:  Supple,  no adenopathy;      Lungs: Clear to auscultation bilaterally, no wheezes rhonchi rales are noted  Chest wall: No tenderness  Heart::  Regular rate and rhythm, S1 and S2 normal, no murmur, rub or gallop  Abdomen: Soft, non-tender, nondistended bowel sounds active  Extremities: No cyanosis, clubbing, or edema  Pulses: 2+ and symmetric all extremities  Skin:  No rashes or lesions  Neuro/psych: A&O x3. CN II through XII are grossly intact with appropriate affect              Lab Results   Component Value Date    GLUCOSE 131 (H) 06/17/2023    BUN 17 06/17/2023    CREATININE 0.89 06/17/2023    EGFR 90.5 06/17/2023    BCR 19.1 06/17/2023    K 4.8 06/17/2023    CO2 27.4 06/17/2023    CALCIUM 9.2 06/17/2023    ALBUMIN 4.10 08/03/2022    BILITOT 0.6 08/03/2022    AST 11 08/03/2022    ALT 9 08/03/2022     Results for orders placed during the hospital encounter of 08/03/22    Adult Transthoracic Echo Complete W/ Cont if Necessary Per Protocol    Interpretation Summary  · Left ventricular wall thickness is consistent with mild concentric hypertrophy.  · Estimated left ventricular EF = 60% Left ventricular systolic function is normal.  · There is calcification of the aortic valve mainly affecting the left coronary cusp(s).  · Left ventricular diastolic function is consistent " with (grade I) impaired relaxation.  · Mild dilation of the aortic root is present.  · Estimated right ventricular systolic pressure from tricuspid regurgitation is normal (<35 mmHg).     Results for orders placed during the hospital encounter of 06/19/23    Cardiac Catheterization/Vascular Study    Narrative  Table formatting from the original result was not included.  Cardiac Catheterization Operative Report    Kaveh Vences  [id number]  6/19/2023  @PCP@    He underwent cardiac catheterization.    Indications for the procedure include: chest pain.    Procedure Details:  The risks, benefits, complications, treatment options, and expected outcomes were discussed with the patient. The patient and/or family concurred with the proposed plan, giving informed consent.    After informed consent the patient was brought to the cath lab after appropriate IV hydration was begun and oral premedication was given. He was further sedated with fentanyl. He was prepped and draped in the usual manner. Using the modified Seldinger access technique, a 6 Colombian sheath was placed in the femoral artery. A left heart catheterization with coronary arteriography was performed. Findings are discussed below.  Same JR4 catheter and also 5 Colombian IMT and IM catheters and also 5 Colombian AL-1 diagnostic catheter were used for selective engagement of the vein graft to the marginal and LIMA to the LAD.    After the procedure was completed, sedation was stopped and the sheaths and catheters were all removed. Hemostasis was achieved per established hospital protocols.    Conscious sedation:  Conscious sedation was performed according to protocol.  I supervised and directed an independent trained observer with the assistance of monitoring the patient's level of consciousness and physiologic status throughout the procedure.  Intraoperative service time was 90 minutes.    Findings:    Hemodynamics Central aortic pressure systolic 125 diastolic 65 with a  "mean pressure of 90 mmHg  LV end-diastolic pressure of 8 mmHg  There was no gradient across the aortic valve on the pullback of the pigtail catheter  Left Main Left main is 100% occluded in the midportion  RCA Right coronary artery is a nondominant mid diffuse 90% stenosis angiographically unchanged from before  % ostial occlusion  Circ 100% ostial occlusion  SVG(s) Vein graft to marginal is patent with no significant stenosis involving the ostium of the anastomotic site  Distal portion of the body of this vein graft has angiographic focal area of 50% stenosis  This vein graft supplies the marginal branch that retrogradely fills the rest of the left circumflex system  HELENE Patent LIMA to the LAD without any significant stenosis involving the ostium/body at the anastomotic site  LAD retrogradely fills about the diagonal branches  LV Not done  Coronary Dominance Right coronary artery    Estimated Blood Loss:  Minimal    Specimens: None    Complications:  None; patient tolerated the procedure well.    Disposition: PACU - hemodynamically stable.    Condition: stable    Impressions:  Severe native three-vessel coronary artery disease  100% occlusion of the left main  Diffuse 90% stenosis involving the proximal right coronary artery that is nondominant  Patent LIMA to the LAD  Patent vein graft to the marginal  Moderate obstructive disease involving the vein graft to the marginal  Normal left-sided filling pressures    Recommendations:  Medical management for obstructive coronary artery disease  Test results reviewed and discussed the patient and family     No results found for: \"CHOL\", \"CHLPL\", \"TRIG\", \"HDL\", \"LDL\", \"LDLDIRECT\"   Results for orders placed during the hospital encounter of 06/08/23    Stress Test With Myocardial Perfusion One Day    Interpretation Summary    Lexiscan myocardial perfusion study shows mostly fixed perfusion defect involving the anterior anteroseptal anteroapical wall consistent with " underlying left bundle branch block during wall motion abnormalities and a perfusion defect than a true reversible ischemia    Left ventricular ejection fraction is mildly reduced (Calculated EF = 46%).    Impressions are consistent with an intermediate risk study.    Clinical correlation is recommended   Results for orders placed during the hospital encounter of 06/19/23    Mobile Cardiac Outpatient Telemetry    Interpretation Summary  Extended Holter monitor study for symptoms of palpitations  Patient was monitored from a June 20, 2023 to July 19, 2023  Underlying rhythm is sinus rhythm with a minimal heart rate of 50 bpm maximal heart rate of 132 bpm average heart rate of 72 bpm  No atrial fibrillation  No sustained ventricular or supraventricular tachyarrhythmia  No clinically significant pauses  No AV block  Relatively benign study  Clinical correlation is recommended           Objective:          Allergies:  No Known Allergies    Medication Review:     Current Outpatient Medications:     amLODIPine (NORVASC) 5 MG tablet, Take 1 tablet by mouth once daily, Disp: 90 tablet, Rfl: 2    aspirin 81 MG EC tablet, Take 1 tablet by mouth Daily., Disp: , Rfl:     atorvastatin (LIPITOR) 20 MG tablet, Take 1 tablet by mouth once daily, Disp: 90 tablet, Rfl: 1    clopidogrel (PLAVIX) 75 MG tablet, Take 1 tablet by mouth once daily, Disp: 90 tablet, Rfl: 1    donepezil (ARICEPT) 5 MG tablet, Take 1 tablet by mouth Daily., Disp: , Rfl:     Farxiga 5 MG tablet tablet, , Disp: , Rfl:     isosorbide mononitrate (IMDUR) 30 MG 24 hr tablet, Take 1 tablet by mouth once daily, Disp: 90 tablet, Rfl: 2    lisinopril (PRINIVIL,ZESTRIL) 40 MG tablet, Take 1 tablet by mouth Daily., Disp: 90 tablet, Rfl: 3    metFORMIN ER (GLUCOPHAGE-XR) 500 MG 24 hr tablet, TAKE 2 TABLETS BY MOUTH TWICE DAILY FOR 30 DAYS, Disp: , Rfl:     nitroglycerin (NITROSTAT) 0.4 MG SL tablet, Place 1 tablet under the tongue Every 5 (Five) Minutes As Needed for  Chest Pain. Take no more than 3 doses in 15 minutes., Disp: 25 tablet, Rfl: 2    ranolazine (RANEXA) 1000 MG 12 hr tablet, TAKE 1 TABLET  BY MOUTH EVERY 12 HOURS, Disp: 180 tablet, Rfl: 1    tiZANidine (ZANAFLEX) 4 MG tablet, Take 1 tablet by mouth Every 8 (Eight) Hours As Needed for Muscle Spasms., Disp: 90 tablet, Rfl: 5    Family History:  Family History   Problem Relation Age of Onset    Coronary artery disease Father     Coronary artery disease Brother     Heart attack Brother     Stroke Brother     Diabetes Maternal Uncle        Past Medical History:  Past Medical History:   Diagnosis Date    Atrial fibrillation     Coronary artery disease     Diabetes mellitus     Gallstones     Hyperlipidemia     Hypertension     Injury of back     Low back pain     Mitral regurgitation     Neck pain     Rheumatoid arthritis     Sleep apnea     Valvular disease        Past surgical History:  Past Surgical History:   Procedure Laterality Date    BACK SURGERY  01/2019    CARDIAC CATHETERIZATION  2006, 2009, 2012, 2018    CARDIAC CATHETERIZATION Right 6/19/2023    Procedure: Left Heart Cath;  Surgeon: Cris Mcneill MD;  Location: [facility] CATH INVASIVE LOCATION;  Service: Cardiovascular;  Laterality: Right;    CHOLECYSTECTOMY  12/22/2015    CORONARY ANGIOPLASTY  2006, 2009    CORONARY ARTERY BYPASS GRAFT  05/11/2012    x2    ROTATOR CUFF REPAIR Left        Social History:  Social History     Socioeconomic History    Marital status:    Tobacco Use    Smoking status: Never     Passive exposure: Never    Smokeless tobacco: Never   Vaping Use    Vaping Use: Never used   Substance and Sexual Activity    Alcohol use: No    Drug use: No    Sexual activity: Defer       Review of Systems:  The following systems were reviewed as they relate to the cardiovascular system: Constitutional, Eyes, ENT, Cardiovascular, Respiratory, Gastrointestinal, Integumentary, Neurological, Psychiatric, Hematologic, Endocrine,  Musculoskeletal, and Genitourinary. The pertinent cardiovascular findings are reported above with all other cardiovascular points within those systems being negative.    Diagnostic Study Review:     Current Electrocardiogram:    ECG 12 Lead    Date/Time: 2/6/2024 1:33 PM  Performed by: Cris Mcneill MD    Authorized by: Cris Mcneill MD  Comparison: compared with previous ECG   Similar to previous ECG  Rhythm: sinus rhythm  Rate: normal  BPM: 69  Conduction: left bundle branch block  QRS axis: normal    Clinical impression: abnormal EKG                NOTE: The following portions of the patient's history were reviewed and updated this visit as appropriate: allergies, current medications, past family history, past medical history, past social history, past surgical history and problem list.

## 2024-02-15 LAB
25(OH)D3+25(OH)D2 SERPL-MCNC: 30 NG/ML (ref 30–100)
FOLATE SERPL-MCNC: 12.1 NG/ML
FT4I SERPL CALC-MCNC: 1.7 (ref 1.2–4.9)
T3RU NFR SERPL: 28 % (ref 24–39)
T4 SERPL-MCNC: 5.9 UG/DL (ref 4.5–12)
TESTOST FREE SERPL-MCNC: 4.8 PG/ML (ref 6.6–18.1)
TESTOST SERPL-MCNC: 637.1 NG/DL (ref 264–916)
TSH SERPL DL<=0.005 MIU/L-ACNC: 2.13 UIU/ML (ref 0.45–4.5)
VIT B12 SERPL-MCNC: 285 PG/ML (ref 232–1245)

## 2024-04-08 ENCOUNTER — HOSPITAL ENCOUNTER (OUTPATIENT)
Dept: PAIN MEDICINE | Facility: HOSPITAL | Age: 74
Discharge: HOME | End: 2024-04-08
Payer: MEDICARE

## 2024-04-08 VITALS
BODY MASS INDEX: 28.28 KG/M2 | HEART RATE: 62 BPM | OXYGEN SATURATION: 97 % | SYSTOLIC BLOOD PRESSURE: 120 MMHG | DIASTOLIC BLOOD PRESSURE: 72 MMHG | TEMPERATURE: 97.1 F | HEIGHT: 66 IN | RESPIRATION RATE: 12 BRPM | WEIGHT: 176 LBS

## 2024-04-08 DIAGNOSIS — M47.817 LUMBOSACRAL SPONDYLOSIS WITHOUT MYELOPATHY: Primary | ICD-10-CM

## 2024-04-08 DIAGNOSIS — R52 PAIN: ICD-10-CM

## 2024-04-08 PROCEDURE — 77003 FLUOROGUIDE FOR SPINE INJECT: CPT

## 2024-04-08 PROCEDURE — 64636 DESTROY L/S FACET JNT ADDL: CPT | Performed by: ANESTHESIOLOGY

## 2024-04-08 PROCEDURE — 25010000002 BUPIVACAINE (PF) 0.25 % SOLUTION: Performed by: ANESTHESIOLOGY

## 2024-04-08 PROCEDURE — 25010000002 METHYLPREDNISOLONE PER 40 MG: Performed by: ANESTHESIOLOGY

## 2024-04-08 PROCEDURE — 64635 DESTROY LUMB/SAC FACET JNT: CPT | Performed by: ANESTHESIOLOGY

## 2024-04-08 RX ORDER — METHYLPREDNISOLONE ACETATE 40 MG/ML
40 INJECTION, SUSPENSION INTRA-ARTICULAR; INTRALESIONAL; INTRAMUSCULAR; SOFT TISSUE ONCE
Status: COMPLETED | OUTPATIENT
Start: 2024-04-08 | End: 2024-04-08

## 2024-04-08 RX ORDER — LIDOCAINE HYDROCHLORIDE 10 MG/ML
5 INJECTION, SOLUTION EPIDURAL; INFILTRATION; INTRACAUDAL; PERINEURAL ONCE
Status: COMPLETED | OUTPATIENT
Start: 2024-04-08 | End: 2024-04-08

## 2024-04-08 RX ORDER — BUPIVACAINE HYDROCHLORIDE 2.5 MG/ML
10 INJECTION, SOLUTION EPIDURAL; INFILTRATION; INTRACAUDAL ONCE
Status: COMPLETED | OUTPATIENT
Start: 2024-04-08 | End: 2024-04-08

## 2024-04-08 RX ADMIN — METHYLPREDNISOLONE ACETATE 40 MG: 40 INJECTION, SUSPENSION INTRA-ARTICULAR; INTRALESIONAL; INTRAMUSCULAR; INTRASYNOVIAL; SOFT TISSUE at 09:20

## 2024-04-08 RX ADMIN — BUPIVACAINE HYDROCHLORIDE 10 ML: 2.5 INJECTION, SOLUTION EPIDURAL; INFILTRATION; INTRACAUDAL; PERINEURAL at 09:20

## 2024-04-08 RX ADMIN — LIDOCAINE HYDROCHLORIDE 5 ML: 10 INJECTION, SOLUTION EPIDURAL; INFILTRATION; INTRACAUDAL; PERINEURAL at 09:17

## 2024-04-08 NOTE — DISCHARGE INSTRUCTIONS
Radiofrequency Lesioning, Care After    Refer to this sheet in the next few weeks. These instructions provide you with information about caring for yourself after your procedure. Your health care provider may also give you more specific instructions. Your treatment has been planned according to current medical practices, but problems sometimes occur. Call your health care provider if you have any problems or questions after your procedure.  What can I expect after the procedure?  After the procedure, it is common to have:  Pain from the burned nerve.  You may feel a burning sensation for up to 1-2 weeks after the procedure  Temporary numbness at the site  Your leg/legs may be weak or feel numb after the procedure until the numbing medication wears off.  When you are up and walking, have assistance to prevent falling.     Follow these instructions at home:  Take over-the-counter and prescription medicines only as told by your health care provider.  Return to your normal activities as told by your health care provider. Ask your health care provider what activities are safe for you.  Pay close attention to how you feel after the procedure. If you start to have pain, write down when it hurts and how it feels. This will help you and your health care provider to know if you need an additional treatment.  Check your needle insertion site every day for signs of infection. Watch for:  Redness, swelling, or pain.  Fluid, blood, or pus.  Keep all follow-up visits as told by your health care provider. This is important.  No driving for 24 hrs-make sure you have full sensation in your legs prior to driving.  Avoid using heat on the injection site for 24 hours. You may use ice intermittently if needed by placing a               towel between your skin and the ice bag and using the ice for 20 minutes 2-3 times a day.  Do not take baths, swim or use a hot tub for 24 hours.  Leave your band-aids on for 24 hours and keep them  dry.    Contact a health care provider if:  Your pain does not get better.  You have redness, swelling, or pain at the needle insertion site.  You have fluid, blood, or pus coming from the needle insertion site.  You have a fever over 101 degrees.  You have new numb.ness in your arm or leg after the procedure    Get help right away if:  You develop sudden, severe pain.  You develop numbness or tingling near the procedure site that does not go away.  This information is not intended to replace advice given to you by your health care provider. Make sure you discuss any questions you have with your health care provider.  Document Released: 08/16/2012 Document Revised: 05/25/2017 Document Reviewed: 01/25/2016  [employer] Interactive Patient Education © 2019 [employer]

## 2024-04-09 ENCOUNTER — TELEPHONE (OUTPATIENT)
Dept: PAIN MEDICINE | Facility: HOSPITAL | Age: 74
End: 2024-04-09
Payer: MEDICARE

## 2024-04-10 NOTE — PROCEDURES
"Subjective    CC back pain  Kaveh Vences is a 73 y.o. male with lumbar spondylosis here for repeat sharifa lumbar RFA.  Off Plavix 7 days    Pain Assessment   Location of Pain: Lower Back, R Hip, L Hip,   Description of Pain: Dull/Aching, Throbbing, Stabbing  Previous Pain Rating :7  Current Pain Ratin  Aggravating Factors: Activity  Alleviating Factors: Rest, Medication    The following portions of the patient's history were reviewed and updated as appropriate: allergies, current medications, past family history, past medical history, past social history, past surgical history and problem list.      Review of Systems  As in HPI  Objective   Physical Exam   Constitutional: He is oriented to person, place, and time. No distress.   Cardiovascular: Normal rate.   Pulmonary/Chest: Effort normal.   Neurological: He is alert and oriented to person, place, and time.     /72 (BP Location: Right arm, Patient Position: Sitting)   Pulse 62   Temp 97.1 °F (36.2 °C) (Skin)   Resp 12   Ht 167.6 cm (66\")   Wt 79.8 kg (176 lb)   SpO2 97%   BMI 28.41 kg/m²       Assessment & Plan    underwent repeat sharifa lumbar RFA    RTC 6 weeks    DATE OF PROCEDURE:   2024      PREOPERATIVE DIAGNOSIS:   Lumbar spondylosis without myelopathy     POSTOPERATIVE DIAGNOSIS: same     PROCEDURE PERFORMED:  Bilateral lumbar Sacral RFTC at  L4/L5, L5/S1.     The patient presents with a history of lumbosacral spondylosis  at level [  L4/L5 ] [ L5/ S1 ].  The patient presents today for lumbosacral RFTC.  The patient understands the risks and benefits of the procedure and wishes to proceed.  The patient was seen in the preoperative area.  Patient's consent was obtained and updated.  Vitals were taken.  Patient was then brought to the procedure suite and placed in a prone position.  The appropriate anatomic area was widely prepped with Chloraprep and draped in a sterile fashion.  Noninvasive monitoring per routine anesthesia protocol was placed. "  Under fluoroscopic guidance an AP view with caudad cephaled tilt was obtained.  A 20 guage RFTC cannula was passed through skin anesthetized with 1% Lidocaine without epinephrine.  The needle tip was guided to the superior medial aspect of the transverse process at [  L4 ][  L5 and  sacral ala ] bilaterally.   Motor stimulation was undertaken at 2.0 mAmps at 2 Hertz. At no point was motor stimulation or sensory stimulation noted in a radicular fashion.  Impedence range 200's. Prior to ablation, each level was anesthetized with 2mL of 1% Lidocaine without epinephrine. The medial branch nerves were then ablated at 80* C for 90 seconds each .  Following ablation, each level was injected with 1 mL of  expiration containing 1 mL of 40 mg Depo-Medrol and 4 mL of 0.25% bupivacaine and the RFTC cannula removed.  A sterile dressing was placed over the puncture site.     The patient tolerated the procedure with no complications. They were then brought to the post procedure area where they recovered nicely.      Discharge  The patient will be discharged home in stable condition.  Patient understands to contact the Center with any post procedure questions or concerns.  Discharge instructions given by nursing staff.

## 2024-04-29 RX ORDER — RANOLAZINE 1000 MG/1
TABLET, EXTENDED RELEASE ORAL
Qty: 180 TABLET | Refills: 0 | Status: SHIPPED | OUTPATIENT
Start: 2024-04-29

## 2024-05-23 ENCOUNTER — OFFICE VISIT (OUTPATIENT)
Dept: PAIN MEDICINE | Facility: CLINIC | Age: 74
End: 2024-05-23
Payer: MEDICARE

## 2024-05-23 VITALS
HEIGHT: 66 IN | DIASTOLIC BLOOD PRESSURE: 73 MMHG | HEART RATE: 70 BPM | WEIGHT: 174.8 LBS | OXYGEN SATURATION: 97 % | SYSTOLIC BLOOD PRESSURE: 117 MMHG | BODY MASS INDEX: 28.09 KG/M2

## 2024-05-23 DIAGNOSIS — M25.50 POLYARTHRALGIA: ICD-10-CM

## 2024-05-23 DIAGNOSIS — M47.812 CERVICAL SPONDYLOSIS WITHOUT MYELOPATHY: ICD-10-CM

## 2024-05-23 DIAGNOSIS — M96.1 POSTLAMINECTOMY SYNDROME OF LUMBAR REGION: ICD-10-CM

## 2024-05-23 DIAGNOSIS — G89.4 CHRONIC PAIN SYNDROME: Primary | ICD-10-CM

## 2024-05-23 DIAGNOSIS — M47.817 LUMBOSACRAL SPONDYLOSIS WITHOUT MYELOPATHY: ICD-10-CM

## 2024-05-23 RX ORDER — NITROGLYCERIN 0.4 MG/1
0.4 TABLET SUBLINGUAL ONCE AS NEEDED
COMMUNITY
Start: 2024-03-02

## 2024-05-23 RX ORDER — DAPAGLIFLOZIN 5 MG/1
5 TABLET, FILM COATED ORAL DAILY
COMMUNITY
Start: 2024-04-03

## 2024-05-23 RX ORDER — DONEPEZIL HYDROCHLORIDE 5 MG/1
10 TABLET, FILM COATED ORAL DAILY
COMMUNITY
Start: 2024-04-25 | End: 2025-04-25

## 2024-05-23 NOTE — PROGRESS NOTES
Subjective    CC Back pain  Kaveh Vences is a 73 y.o. male with chronic back pain history of decompression/L2-3 microdiscectomy in 2019/Dr. Brandon, here for f/u.    Repeat lumbar RFA last visit reports 70% relief with functional benefits.  Denies any new issues or concerns today except mild bilateral SI pain except mild bilateral SI pain.    Chronic axial back pain radiating to bilateral hip without radicular pain worse with standing walking or prolonged activity.  Denies weakness, saddle anesthesia bladder bowel continence.  Chronic neck pain radiating to head and bilateral shoulders worse at night and associated with muscle spasm headaches.  Denies weakness, balance issues, bladder bowel continence.    Tried physical therapy and medication with marginal relief.  Interfering with ADL and sleep.   Seen neurosurgery, no surgery recommended.    C-spine MRI  Multilevel degenerative disc disease most notably at C5-C6 and C6-C7 with mild spinal canal stenosis. Moderate right-sided neuroforaminal narrowing at C6-C7 with mild left-sided neuroforaminal narrowing.  L-spine x-ray  Good range of motion with flexion and extension.  No evidence of subluxation. Persistent degenerative disc space narrowing at the L3/4 and L5/N1ohanpt, unchanged from prior study.   L-spine MRI 2018 degenerative disc disease and mild facet degenerative change. Findings are most pronounced at L2-L3 were is moderate to severe neural foramen narrowing on the left due to the large focal disc extrusion.  mild neural foramen narrowing on the right at the L2-L3 level due to a smaller focal extrusion    Pain Assessment   Location of Pain: Lower Back  Description of Pain: Dull/Aching, Throbbing, Stabbing  Previous Pain Rating :2  Current Pain Ratin  Aggravating Factors: Activity  Alleviating Factors: Rest, Medication    PEG Assessment   What number best describes your pain on average in the past week?1  What number best describes how,  "during the past week, pain has interfered with your enjoyment of life?1  What number best describes how, during the past week, pain has interfered with your general activity?6    The following portions of the patient's history were reviewed and updated as appropriate: allergies, current medications, past family history, past medical history, past social history, past surgical history and problem list.     has a past medical history of Atrial fibrillation, Coronary artery disease, Diabetes mellitus, Gallstones, Hyperlipidemia, Hypertension, Injury of back, Low back pain, Mitral regurgitation, Neck pain, Rheumatoid arthritis, Sleep apnea, and Valvular disease.   has a past surgical history that includes Coronary artery bypass graft (05/11/2012); Coronary angioplasty (2006, 2009); Rotator cuff repair (Left); Cholecystectomy (12/22/2015); Cardiac catheterization (2006, 2009, 2012, 2018); Back surgery (01/2019); and Cardiac catheterization (Right, 06/19/2023).  family history includes Coronary artery disease in his brother and father; Diabetes in his maternal uncle; Heart attack in his brother; Stroke in his brother.      Review of Systems   Musculoskeletal:  Positive for arthralgias, back pain, myalgias, neck pain and neck stiffness.   All other systems reviewed and are negative.      Objective   Physical Exam   Constitutional: No distress.   Pulmonary/Chest: Effort normal.   Musculoskeletal:      Cervical back: He exhibits tenderness.      Comments: Positive bilateral Gaenslen, positive bilateral SI compression test, positive bilateral Jackson   Vitals reviewed.    /73 (BP Location: Right arm, Patient Position: Sitting, Cuff Size: Adult)   Pulse 70   Ht 167.6 cm (65.98\")   Wt 79.3 kg (174 lb 12.8 oz)   SpO2 97%   BMI 28.23 kg/m²      PHQ 9 on chart  Opioid risk tool low risk    Assessment & Plan   Diagnoses and all orders for this visit:    1. Chronic pain syndrome (Primary)    2. Cervical spondylosis without " myelopathy    3. Postlaminectomy syndrome of lumbar region    4. Polyarthralgia    5. Lumbosacral spondylosis without myelopathy    Summary  Kaveh Vences is a 72 y.o. male with chronic back pain history of decompression/L2-3 microdiscectomy in January 2019/Dr. Brandon, chronic neck pain here for f/u.   Chronic neck pain from DDD spondylosis.  Chronic back pain from postlaminectomy syndrome and chronic polyarthralgia.  Repeat lumbar RFA or cervical CLARENCE as needed.    Repeat lumbar RFA last visit reports 70% relief with functional benefits.  Denies any new issues or concerns today except mild bilateral SI pain except mild bilateral SI pain.  If persist consider bilateral SI injection.    Continue  Zanaflex at as needed bedtime.    RTC  for procedure or in 6 months

## 2024-06-03 RX ORDER — CLOPIDOGREL BISULFATE 75 MG/1
TABLET ORAL
Qty: 90 TABLET | Refills: 0 | Status: SHIPPED | OUTPATIENT
Start: 2024-06-03

## 2024-06-11 RX ORDER — LISINOPRIL 40 MG/1
40 TABLET ORAL DAILY
Qty: 90 TABLET | Refills: 0 | Status: SHIPPED | OUTPATIENT
Start: 2024-06-11

## 2024-07-08 RX ORDER — NITROGLYCERIN 0.4 MG/1
0.4 TABLET SUBLINGUAL
Qty: 25 TABLET | Refills: 0 | Status: SHIPPED | OUTPATIENT
Start: 2024-07-08

## 2024-07-24 RX ORDER — RANOLAZINE 1000 MG/1
TABLET, EXTENDED RELEASE ORAL
Qty: 180 TABLET | Refills: 0 | Status: SHIPPED | OUTPATIENT
Start: 2024-07-24

## 2024-08-06 ENCOUNTER — OFFICE VISIT (OUTPATIENT)
Dept: CARDIOLOGY | Facility: CLINIC | Age: 74
End: 2024-08-06
Payer: MEDICARE

## 2024-08-06 VITALS
OXYGEN SATURATION: 95 % | DIASTOLIC BLOOD PRESSURE: 59 MMHG | WEIGHT: 173 LBS | HEIGHT: 65 IN | HEART RATE: 70 BPM | BODY MASS INDEX: 28.82 KG/M2 | SYSTOLIC BLOOD PRESSURE: 99 MMHG

## 2024-08-06 DIAGNOSIS — E78.2 MIXED HYPERLIPIDEMIA: ICD-10-CM

## 2024-08-06 DIAGNOSIS — Z95.1 S/P CABG X 4: ICD-10-CM

## 2024-08-06 DIAGNOSIS — I44.7 LEFT BUNDLE BRANCH BLOCK: ICD-10-CM

## 2024-08-06 DIAGNOSIS — I38 VALVULAR HEART DISEASE: Primary | ICD-10-CM

## 2024-08-06 DIAGNOSIS — I48.0 PAROXYSMAL ATRIAL FIBRILLATION: ICD-10-CM

## 2024-08-06 DIAGNOSIS — I10 PRIMARY HYPERTENSION: ICD-10-CM

## 2024-08-06 PROCEDURE — 99214 OFFICE O/P EST MOD 30 MIN: CPT | Performed by: INTERNAL MEDICINE

## 2024-08-06 PROCEDURE — 3074F SYST BP LT 130 MM HG: CPT | Performed by: INTERNAL MEDICINE

## 2024-08-06 PROCEDURE — 1160F RVW MEDS BY RX/DR IN RCRD: CPT | Performed by: INTERNAL MEDICINE

## 2024-08-06 PROCEDURE — 3078F DIAST BP <80 MM HG: CPT | Performed by: INTERNAL MEDICINE

## 2024-08-06 PROCEDURE — 1159F MED LIST DOCD IN RCRD: CPT | Performed by: INTERNAL MEDICINE

## 2024-08-06 PROCEDURE — 93000 ELECTROCARDIOGRAM COMPLETE: CPT | Performed by: INTERNAL MEDICINE

## 2024-08-06 NOTE — PROGRESS NOTES
Cardiology Office Visit      Encounter Date:  08/06/2024    Patient ID:   Kaveh Vences is a 73 y.o. male.    Reason For Followup:  Coronary artery disease  Hypertension  Hyperlipidemia      Brief Clinical History:  Dear Siknny Harper MD    I had the pleasure of seeing Kaveh Vences today. As you are well aware, this is a 73 y.o. male with history of diabetes,  coronary artery disease, status post previous CABG in 2012, and PCI stenting.     cardiac catheterization with graft angiography showed LV ejection fraction of 40%, severe native three-vessel coronary disease with 90% calcified stenosis involving the distal left main involving the ostium of the LAD significant 99% lesion of left circumflex coronary artery.  100% occlusion of the nondominant right coronary artery was noted as well.  Ashby to the LAD was patent and saphenous vein graft to marginal branch was patent as well. Echocardiogram with LV ejection fraction of 45% with moderate mitral regurgitation      Interval History:  Denies any new cardiac symptoms  Denies any dizziness or syncope        Interpretation Summary    Left ventricular wall thickness is consistent with mild concentric hypertrophy.  Estimated left ventricular EF = 60% Left ventricular systolic function is normal.  There is calcification of the aortic valve mainly affecting the left coronary cusp(s).  Left ventricular diastolic function is consistent with (grade I) impaired relaxation.  Mild dilation of the aortic root is present.  Estimated right ventricular systolic pressure from tricuspid regurgitation is normal (<35 mmHg).    Interpretation Summary    Myocardial perfusion study did not show any evidence of any significant reversible ischemia  Myocardial perfusion study shows moderate size severe intensity mostly fixed perfusion defect involving the anterior anteroapical wall and anteroseptal wall likely secondary to underlying attenuation artifact from left bundle branch block  Left  "ventricular ejection fraction is normal. (Calculated EF = 60%).  Impressions are consistent with an intermediate risk study.  Clinical correlation is recommended        Assessment & Plan    Impressions:  Coronary artery disease status post coronary artery bypass surgery  Hypertension  Hyperlipidemia  Sinus bradycardia with a left bundle branch block  Peripheral vascular disease/altered moderate obstructive carotid artery disease  Mild to moderate mitral regurgitation  Diabetes mellitus/most recent hemoglobin A1c was optimal  Postop atrial fibrillation currently maintaining sinus rhythm  Bilateral carotid stenosis in the moderate range  Intermittent asymptomatic bradycardia and also intermittent tachycardia  Stable angina  Mild bilateral carotid stenosis  Medical management for coronary artery disease recent cath films reviewed and discussed with patient    Recommendations:    Good functional capacity  Discontinue metoprolol  Close monitoring of heart rate and blood pressure at home  Monitoring of heart rate on Apple Watch  Continued aggressive risk factor modification  Continue current medical therapy with aspirin 81 mg p.o. once a day Plavix 75 mg p.o. once a day  Lipitor 20 mg p.o. once a day lisinopril 20 mg p.o. once a day isosorbide 30 mg p.o. once a day Ranexa 1000 mg p.o. twice daily  Heart rate has improved with discontinuing beta-blocker  Close monitoring of blood pressure at home  If the blood pressure is low consider decreasing the dose of Norvasc  Home blood pressure readings are optimal per patient  Close monitoring of blood pressure at home and call back next 2 weeks and consider decreasing or discontinuing Norvasc if the blood pressure is low  Follow-up in office 6 months        Vitals:  Vitals:    08/06/24 1312   BP: 99/59   Pulse: 70   SpO2: 95%   Weight: 78.5 kg (173 lb)   Height: 165.1 cm (65\")       Physical Exam:    General: Alert, cooperative, no distress, appears stated " age  Head:  Normocephalic, atraumatic, mucous membranes moist  Eyes:  Conjunctiva/corneas clear, EOM's intact     Neck:  Supple,  no adenopathy;      Lungs: Clear to auscultation bilaterally, no wheezes rhonchi rales are noted  Chest wall: No tenderness  Heart::  Regular rate and rhythm, S1 and S2 normal, no murmur, rub or gallop  Abdomen: Soft, non-tender, nondistended bowel sounds active  Extremities: No cyanosis, clubbing, or edema  Pulses: 2+ and symmetric all extremities  Skin:  No rashes or lesions  Neuro/psych: A&O x3. CN II through XII are grossly intact with appropriate affect              Lab Results   Component Value Date    GLUCOSE 131 (H) 06/17/2023    BUN 17 06/17/2023    CREATININE 0.89 06/17/2023    EGFR 90.5 06/17/2023    BCR 19.1 06/17/2023    K 4.8 06/17/2023    CO2 27.4 06/17/2023    CALCIUM 9.2 06/17/2023    ALBUMIN 4.10 08/03/2022    BILITOT 0.6 08/03/2022    AST 11 08/03/2022    ALT 9 08/03/2022     Results for orders placed during the hospital encounter of 08/03/22    Adult Transthoracic Echo Complete W/ Cont if Necessary Per Protocol    Interpretation Summary  · Left ventricular wall thickness is consistent with mild concentric hypertrophy.  · Estimated left ventricular EF = 60% Left ventricular systolic function is normal.  · There is calcification of the aortic valve mainly affecting the left coronary cusp(s).  · Left ventricular diastolic function is consistent with (grade I) impaired relaxation.  · Mild dilation of the aortic root is present.  · Estimated right ventricular systolic pressure from tricuspid regurgitation is normal (<35 mmHg).     Results for orders placed during the hospital encounter of 06/19/23    Cardiac Catheterization/Vascular Study    Conclusion  Table formatting from the original result was not included.  Cardiac Catheterization Operative Report    Kaveh TANNER Vences  [id number]  6/19/2023  @PCP@    He underwent cardiac catheterization.    Indications for the procedure  include: chest pain.    Procedure Details:  The risks, benefits, complications, treatment options, and expected outcomes were discussed with the patient. The patient and/or family concurred with the proposed plan, giving informed consent.    After informed consent the patient was brought to the cath lab after appropriate IV hydration was begun and oral premedication was given. He was further sedated with fentanyl. He was prepped and draped in the usual manner. Using the modified Seldinger access technique, a 6 [place] sheath was placed in the femoral artery. A left heart catheterization with coronary arteriography was performed. Findings are discussed below.  Same JR4 catheter and also 5 [place]ran IMT and IM catheters and also 5 [place]ran AL-1 diagnostic catheter were used for selective engagement of the vein graft to the marginal and LIMA to the LAD.    After the procedure was completed, sedation was stopped and the sheaths and catheters were all removed. Hemostasis was achieved per established hospital protocols.    Conscious sedation:  Conscious sedation was performed according to protocol.  I supervised and directed an independent trained observer with the assistance of monitoring the patient's level of consciousness and physiologic status throughout the procedure.  Intraoperative service time was 90 minutes.    Findings:    Hemodynamics Central aortic pressure systolic 125 diastolic 65 with a mean pressure of 90 mmHg  LV end-diastolic pressure of 8 mmHg  There was no gradient across the aortic valve on the pullback of the pigtail catheter  Left Main Left main is 100% occluded in the midportion  RCA Right coronary artery is a nondominant mid diffuse 90% stenosis angiographically unchanged from before  % ostial occlusion  Circ 100% ostial occlusion  SVG(s) Vein graft to marginal is patent with no significant stenosis involving the ostium of the anastomotic site  Distal portion of the body of this vein graft has  "angiographic focal area of 50% stenosis  This vein graft supplies the marginal branch that retrogradely fills the rest of the left circumflex system  HELENE Patent LIMA to the LAD without any significant stenosis involving the ostium/body at the anastomotic site  LAD retrogradely fills about the diagonal branches  LV Not done  Coronary Dominance Right coronary artery    Estimated Blood Loss:  Minimal    Specimens: None    Complications:  None; patient tolerated the procedure well.    Disposition: PACU - hemodynamically stable.    Condition: stable    Impressions:  Severe native three-vessel coronary artery disease  100% occlusion of the left main  Diffuse 90% stenosis involving the proximal right coronary artery that is nondominant  Patent LIMA to the LAD  Patent vein graft to the marginal  Moderate obstructive disease involving the vein graft to the marginal  Normal left-sided filling pressures    Recommendations:  Medical management for obstructive coronary artery disease  Test results reviewed and discussed the patient and family     No results found for: \"CHOL\", \"CHLPL\", \"TRIG\", \"HDL\", \"LDL\", \"LDLDIRECT\"   Results for orders placed during the hospital encounter of 06/08/23    Stress Test With Myocardial Perfusion One Day    Interpretation Summary    Lexiscan myocardial perfusion study shows mostly fixed perfusion defect involving the anterior anteroseptal anteroapical wall consistent with underlying left bundle branch block during wall motion abnormalities and a perfusion defect than a true reversible ischemia    Left ventricular ejection fraction is mildly reduced (Calculated EF = 46%).    Impressions are consistent with an intermediate risk study.    Clinical correlation is recommended   Results for orders placed during the hospital encounter of 06/19/23    Mobile Cardiac Outpatient Telemetry    Interpretation Summary  Extended Holter monitor study for symptoms of palpitations  Patient was monitored from a June " 20, 2023 to July 19, 2023  Underlying rhythm is sinus rhythm with a minimal heart rate of 50 bpm maximal heart rate of 132 bpm average heart rate of 72 bpm  No atrial fibrillation  No sustained ventricular or supraventricular tachyarrhythmia  No clinically significant pauses  No AV block  Relatively benign study  Clinical correlation is recommended           Objective:          Allergies:  No Known Allergies    Medication Review:     Current Outpatient Medications:     donepezil (ARICEPT) 5 MG tablet, Take 2 tablets by mouth Daily., Disp: , Rfl:     amLODIPine (NORVASC) 5 MG tablet, Take 1 tablet by mouth once daily, Disp: 90 tablet, Rfl: 2    aspirin 81 MG EC tablet, Take 1 tablet by mouth Daily., Disp: , Rfl:     atorvastatin (LIPITOR) 20 MG tablet, Take 1 tablet by mouth once daily, Disp: 90 tablet, Rfl: 1    clopidogrel (PLAVIX) 75 MG tablet, Take 1 tablet by mouth once daily, Disp: 90 tablet, Rfl: 0    dapagliflozin (Farxiga) 5 MG tablet tablet, Take 1 tablet by mouth Daily., Disp: , Rfl:     Farxiga 5 MG tablet tablet, , Disp: , Rfl:     isosorbide mononitrate (IMDUR) 30 MG 24 hr tablet, Take 1 tablet by mouth once daily, Disp: 90 tablet, Rfl: 2    lisinopril (PRINIVIL,ZESTRIL) 40 MG tablet, Take 1 tablet by mouth once daily, Disp: 90 tablet, Rfl: 0    metFORMIN ER (GLUCOPHAGE-XR) 500 MG 24 hr tablet, TAKE 2 TABLETS BY MOUTH TWICE DAILY FOR 30 DAYS, Disp: , Rfl:     nitroglycerin (NITROSTAT) 0.4 MG SL tablet, Place 1 tablet under the tongue 1 (One) Time As Needed for Chest Pain., Disp: , Rfl:     nitroglycerin (NITROSTAT) 0.4 MG SL tablet, DISSOLVE ONE TABLET UNDER THE TONGUE EVERY 5 MINUTES AS NEEDED FOR CHEST PAIN.  DO NOT EXCEED A TOTAL OF 3 DOSES IN 15 MINUTES, Disp: 25 tablet, Rfl: 0    ranolazine (RANEXA) 1000 MG 12 hr tablet, TAKE 1 TABLET BY MOUTH EVERY 12 HOURS, Disp: 180 tablet, Rfl: 0    tiZANidine (ZANAFLEX) 4 MG tablet, Take 1 tablet by mouth Every 8 (Eight) Hours As Needed for Muscle Spasms.,  Disp: 90 tablet, Rfl: 5    Family History:  Family History   Problem Relation Age of Onset    Coronary artery disease Father     Coronary artery disease Brother     Heart attack Brother     Stroke Brother     Diabetes Maternal Uncle        Past Medical History:  Past Medical History:   Diagnosis Date    Atrial fibrillation     Coronary artery disease     Diabetes mellitus     Gallstones     Hyperlipidemia     Hypertension     Injury of back     Low back pain     Mitral regurgitation     Neck pain     Rheumatoid arthritis     Sleep apnea     Valvular disease        Past surgical History:  Past Surgical History:   Procedure Laterality Date    BACK SURGERY  01/2019    CARDIAC CATHETERIZATION  2006, 2009, 2012, 2018    CARDIAC CATHETERIZATION Right 06/19/2023    Procedure: Left Heart Cath;  Surgeon: Cris Mcneill MD;  Location: [facility] CATH INVASIVE LOCATION;  Service: Cardiovascular;  Laterality: Right;    CHOLECYSTECTOMY  12/22/2015    CORONARY ANGIOPLASTY  2006, 2009    CORONARY ARTERY BYPASS GRAFT  05/11/2012    x2    ROTATOR CUFF REPAIR Left        Social History:  Social History     Socioeconomic History    Marital status:    Tobacco Use    Smoking status: Never     Passive exposure: Never    Smokeless tobacco: Never   Vaping Use    Vaping status: Never Used   Substance and Sexual Activity    Alcohol use: No    Drug use: No    Sexual activity: Yes     Partners: Female       Review of Systems:  The following systems were reviewed as they relate to the cardiovascular system: Constitutional, Eyes, ENT, Cardiovascular, Respiratory, Gastrointestinal, Integumentary, Neurological, Psychiatric, Hematologic, Endocrine, Musculoskeletal, and Genitourinary. The pertinent cardiovascular findings are reported above with all other cardiovascular points within those systems being negative.    Diagnostic Study Review:     Current Electrocardiogram:    ECG 12 Lead    Date/Time: 8/6/2024 1:49 PM  Performed by:  Cris Mcneill MD    Authorized by: Cris Mcneill MD  Comparison: compared with previous ECG   Similar to previous ECG  Rhythm: sinus rhythm  Rate: normal  BPM: 72  Conduction: left bundle branch block  QRS axis: normal    Clinical impression: abnormal EKG                NOTE: The following portions of the patient's history were reviewed and updated this visit as appropriate: allergies, current medications, past family history, past medical history, past social history, past surgical history and problem list.

## 2024-08-15 RX ORDER — AMLODIPINE BESYLATE 5 MG/1
5 TABLET ORAL DAILY
Qty: 90 TABLET | Refills: 2 | Status: SHIPPED | OUTPATIENT
Start: 2024-08-15

## 2024-08-27 RX ORDER — CLOPIDOGREL BISULFATE 75 MG/1
TABLET ORAL
Qty: 90 TABLET | Refills: 0 | Status: SHIPPED | OUTPATIENT
Start: 2024-08-27

## 2024-09-04 RX ORDER — LISINOPRIL 40 MG/1
40 TABLET ORAL DAILY
Qty: 90 TABLET | Refills: 1 | Status: SHIPPED | OUTPATIENT
Start: 2024-09-04

## 2024-09-16 ENCOUNTER — TELEPHONE (OUTPATIENT)
Dept: CARDIOLOGY | Facility: CLINIC | Age: 74
End: 2024-09-16

## 2024-09-16 NOTE — TELEPHONE ENCOUNTER
Per Kristina- hold bp medications if BP is truly this low-If is staying this low he needs to go to the ER. If BP stays in normal range, can cont to hold medication and monitor. Call the office if BP starts to run high, we will re-assess. They report it is 120/70 this morning.

## 2024-09-23 RX ORDER — ISOSORBIDE MONONITRATE 30 MG/1
TABLET, EXTENDED RELEASE ORAL
Qty: 90 TABLET | Refills: 1 | Status: SHIPPED | OUTPATIENT
Start: 2024-09-23

## 2024-10-18 RX ORDER — RANOLAZINE 1000 MG/1
1 TABLET, EXTENDED RELEASE ORAL EVERY 12 HOURS SCHEDULED
Qty: 180 TABLET | Refills: 0 | Status: SHIPPED | OUTPATIENT
Start: 2024-10-18

## 2024-11-19 RX ORDER — CLOPIDOGREL BISULFATE 75 MG/1
TABLET ORAL
Qty: 90 TABLET | Refills: 0 | Status: SHIPPED | OUTPATIENT
Start: 2024-11-19

## 2024-11-21 ENCOUNTER — OFFICE VISIT (OUTPATIENT)
Dept: PAIN MEDICINE | Facility: CLINIC | Age: 74
End: 2024-11-21
Payer: MEDICARE

## 2024-11-21 VITALS
WEIGHT: 174 LBS | SYSTOLIC BLOOD PRESSURE: 148 MMHG | OXYGEN SATURATION: 97 % | DIASTOLIC BLOOD PRESSURE: 86 MMHG | BODY MASS INDEX: 28.96 KG/M2 | HEART RATE: 65 BPM | RESPIRATION RATE: 16 BRPM

## 2024-11-21 DIAGNOSIS — M25.50 POLYARTHRALGIA: ICD-10-CM

## 2024-11-21 DIAGNOSIS — M47.817 LUMBOSACRAL SPONDYLOSIS WITHOUT MYELOPATHY: ICD-10-CM

## 2024-11-21 DIAGNOSIS — G89.4 CHRONIC PAIN SYNDROME: Primary | ICD-10-CM

## 2024-11-21 DIAGNOSIS — M47.812 CERVICAL SPONDYLOSIS WITHOUT MYELOPATHY: ICD-10-CM

## 2024-11-21 RX ORDER — MEMANTINE HYDROCHLORIDE 5 MG/1
5 TABLET ORAL 2 TIMES DAILY
COMMUNITY

## 2024-11-21 NOTE — PROGRESS NOTES
Subjective    CC Back pain  Kaveh Vences is a 73 y.o. male with chronic back pain history of decompression/L2-3 microdiscectomy in January 2019/Dr. Brandon, here for f/u.    Complains of worsening axial back pain for the last 2 to 3 weeks.  Constant worse with standing walking and activity.  Had 70% relief with lumbar RFA which lasted 6 to 7 months.  Now symptoms have returned and interfering with ADL.  He requests repeat    Chronic axial back pain radiating to bilateral hip without radicular pain worse with standing walking or prolonged activity.  Denies weakness, saddle anesthesia bladder bowel continence.  Chronic neck pain radiating to head and bilateral shoulders worse at night and associated with muscle spasm headaches.  Denies weakness, balance issues, bladder bowel continence.    Tried physical therapy and medication with marginal relief.  Interfering with ADL and sleep.   Seen neurosurgery, no surgery recommended.    C-spine MRI 2021 Multilevel degenerative disc disease most notably at C5-C6 and C6-C7 with mild spinal canal stenosis. Moderate right-sided neuroforaminal narrowing at C6-C7 with mild left-sided neuroforaminal narrowing.  L-spine x-ray 2020 Good range of motion with flexion and extension.  No evidence of subluxation. Persistent degenerative disc space narrowing at the L3/4 and L5/[id number], unchanged from prior study.   L-spine MRI 2018 degenerative disc disease and mild facet degenerative change. Findings are most pronounced at L2-L3 were is moderate to severe neural foramen narrowing on the left due to the large focal disc extrusion.  mild neural foramen narrowing on the right at the L2-L3 level due to a smaller focal extrusion    Pain Assessment   Location of Pain: Lower Back  Description of Pain: Dull/Aching, Throbbing, Stabbing  Previous Pain Rating :2  Current Pain Rating: 3  Aggravating Factors: Activity  Alleviating Factors: Rest, Medication    PEG Assessment   What number best describes  your pain on average in the past week?1  What number best describes how, during the past week, pain has interfered with your enjoyment of life?1  What number best describes how, during the past week, pain has interfered with your general activity?6    The following portions of the patient's history were reviewed and updated as appropriate: allergies, current medications, past family history, past medical history, past social history, past surgical history and problem list.    Review of Systems   Musculoskeletal:  Positive for arthralgias, back pain, myalgias, neck pain and neck stiffness.   All other systems reviewed and are negative.      Objective   Physical Exam   Constitutional: No distress.   Pulmonary/Chest: Effort normal.   Musculoskeletal:      Cervical back: He exhibits tenderness.      Comments: Positive bilateral Gaenslen, positive bilateral SI compression test, positive bilateral Jackson   Vitals reviewed.    /86 (BP Location: Left arm, Patient Position: Sitting, Cuff Size: Adult)   Pulse 65   Resp 16   Wt 78.9 kg (174 lb)   SpO2 97%   BMI 28.96 kg/m²      PHQ 9 on chart  Opioid risk tool low risk    Assessment & Plan   Diagnoses and all orders for this visit:    1. Chronic pain syndrome (Primary)    2. Lumbosacral spondylosis without myelopathy  -     Facet    3. Cervical spondylosis without myelopathy    4. Polyarthralgia    Summary  Kaveh Vences is a 73 y.o. male with chronic back pain history of decompression/L2-3 microdiscectomy in January 2019/Dr. Brandon, chronic neck pain here for f/u.   Chronic neck pain from DDD spondylosis.  Chronic back pain from postlaminectomy syndrome and chronic polyarthralgia.  Repeat lumbar RFA or cervical CLARENCE as needed.    Complains of worsening axial back pain for the last 2 to 3 weeks.  Constant worse with standing walking and activity.  Had 70% relief with lumbar RFA which lasted 6 to 7 months.  Now symptoms have returned and interfering with ADL.  He  requests repeat  Will schedule for repeat bilateral lumbar RFA at L4/5, L5/S1.  Risks and benefits discussed.    Continue  Zanaflex at as needed bedtime.    RTC  for procedure

## 2024-12-01 ENCOUNTER — APPOINTMENT (OUTPATIENT)
Dept: GENERAL RADIOLOGY | Facility: HOSPITAL | Age: 74
End: 2024-12-01
Payer: MEDICARE

## 2024-12-01 ENCOUNTER — HOSPITAL ENCOUNTER (EMERGENCY)
Facility: HOSPITAL | Age: 74
Discharge: HOME | End: 2024-12-01
Attending: EMERGENCY MEDICINE | Admitting: EMERGENCY MEDICINE
Payer: MEDICARE

## 2024-12-01 VITALS
SYSTOLIC BLOOD PRESSURE: 149 MMHG | RESPIRATION RATE: 18 BRPM | DIASTOLIC BLOOD PRESSURE: 86 MMHG | WEIGHT: 167.7 LBS | TEMPERATURE: 100.1 F | HEIGHT: 66 IN | OXYGEN SATURATION: 92 % | BODY MASS INDEX: 26.95 KG/M2 | HEART RATE: 102 BPM

## 2024-12-01 DIAGNOSIS — J18.9 PNEUMONIA OF RIGHT MIDDLE LOBE DUE TO INFECTIOUS ORGANISM: Primary | ICD-10-CM

## 2024-12-01 LAB
ALBUMIN SERPL-MCNC: 3.9 G/DL (ref 3.5–5.2)
ALBUMIN/GLOB SERPL: 1.8 G/DL
ALP SERPL-CCNC: 61 U/L (ref 39–117)
ALT SERPL W P-5'-P-CCNC: 15 U/L (ref 1–41)
ANION GAP SERPL CALCULATED.3IONS-SCNC: 9.4 MMOL/L (ref 5–15)
AST SERPL-CCNC: 10 U/L (ref 1–40)
BASOPHILS # BLD AUTO: 0.02 10*3/MM3 (ref 0–0.2)
BASOPHILS NFR BLD AUTO: 0.2 % (ref 0–1.5)
BILIRUB SERPL-MCNC: 0.6 MG/DL (ref 0–1.2)
BUN SERPL-MCNC: 18 MG/DL (ref 8–23)
BUN/CREAT SERPL: 18.9 (ref 7–25)
CALCIUM SPEC-SCNC: 9 MG/DL (ref 8.6–10.5)
CHLORIDE SERPL-SCNC: 98 MMOL/L (ref 98–107)
CO2 SERPL-SCNC: 28.6 MMOL/L (ref 22–29)
CREAT SERPL-MCNC: 0.95 MG/DL (ref 0.76–1.27)
DEPRECATED RDW RBC AUTO: 46.7 FL (ref 37–54)
EGFRCR SERPLBLD CKD-EPI 2021: 84 ML/MIN/1.73
EOSINOPHIL # BLD AUTO: 0.03 10*3/MM3 (ref 0–0.4)
EOSINOPHIL NFR BLD AUTO: 0.3 % (ref 0.3–6.2)
ERYTHROCYTE [DISTWIDTH] IN BLOOD BY AUTOMATED COUNT: 12.6 % (ref 12.3–15.4)
FLUAV SUBTYP SPEC NAA+PROBE: NOT DETECTED
FLUBV RNA ISLT QL NAA+PROBE: NOT DETECTED
GLOBULIN UR ELPH-MCNC: 2.2 GM/DL
GLUCOSE SERPL-MCNC: 250 MG/DL (ref 65–99)
HCT VFR BLD AUTO: 41.5 % (ref 37.5–51)
HGB BLD-MCNC: 13.7 G/DL (ref 13–17.7)
IMM GRANULOCYTES # BLD AUTO: 0.02 10*3/MM3 (ref 0–0.05)
IMM GRANULOCYTES NFR BLD AUTO: 0.2 % (ref 0–0.5)
LYMPHOCYTES # BLD AUTO: 0.7 10*3/MM3 (ref 0.7–3.1)
LYMPHOCYTES NFR BLD AUTO: 7.5 % (ref 19.6–45.3)
MCH RBC QN AUTO: 32.9 PG (ref 26.6–33)
MCHC RBC AUTO-ENTMCNC: 33 G/DL (ref 31.5–35.7)
MCV RBC AUTO: 99.8 FL (ref 79–97)
MONOCYTES # BLD AUTO: 0.81 10*3/MM3 (ref 0.1–0.9)
MONOCYTES NFR BLD AUTO: 8.7 % (ref 5–12)
NEUTROPHILS NFR BLD AUTO: 7.74 10*3/MM3 (ref 1.7–7)
NEUTROPHILS NFR BLD AUTO: 83.1 % (ref 42.7–76)
PLATELET # BLD AUTO: 150 10*3/MM3 (ref 140–450)
PMV BLD AUTO: 8.3 FL (ref 6–12)
POTASSIUM SERPL-SCNC: 4 MMOL/L (ref 3.5–5.2)
PROT SERPL-MCNC: 6.1 G/DL (ref 6–8.5)
RBC # BLD AUTO: 4.16 10*6/MM3 (ref 4.14–5.8)
SARS-COV-2 RNA RESP QL NAA+PROBE: NOT DETECTED
SODIUM SERPL-SCNC: 136 MMOL/L (ref 136–145)
WBC NRBC COR # BLD AUTO: 9.32 10*3/MM3 (ref 3.4–10.8)

## 2024-12-01 PROCEDURE — 25810000003 SODIUM CHLORIDE 0.9 % SOLUTION: Performed by: NURSE PRACTITIONER

## 2024-12-01 PROCEDURE — 25010000002 CEFTRIAXONE PER 250 MG: Performed by: NURSE PRACTITIONER

## 2024-12-01 PROCEDURE — 80053 COMPREHEN METABOLIC PANEL: CPT | Performed by: NURSE PRACTITIONER

## 2024-12-01 PROCEDURE — 99283 EMERGENCY DEPT VISIT LOW MDM: CPT

## 2024-12-01 PROCEDURE — 71046 X-RAY EXAM CHEST 2 VIEWS: CPT

## 2024-12-01 PROCEDURE — 85025 COMPLETE CBC W/AUTO DIFF WBC: CPT | Performed by: NURSE PRACTITIONER

## 2024-12-01 PROCEDURE — 96361 HYDRATE IV INFUSION ADD-ON: CPT

## 2024-12-01 PROCEDURE — 87636 SARSCOV2 & INF A&B AMP PRB: CPT | Performed by: EMERGENCY MEDICINE

## 2024-12-01 PROCEDURE — 96365 THER/PROPH/DIAG IV INF INIT: CPT

## 2024-12-01 RX ORDER — AZITHROMYCIN 250 MG/1
TABLET, FILM COATED ORAL
Qty: 6 TABLET | Refills: 0 | Status: SHIPPED | OUTPATIENT
Start: 2024-12-01

## 2024-12-01 RX ORDER — ALBUTEROL SULFATE 90 UG/1
2 INHALANT RESPIRATORY (INHALATION) EVERY 4 HOURS PRN
Qty: 1 G | Refills: 0 | Status: SHIPPED | OUTPATIENT
Start: 2024-12-01

## 2024-12-01 RX ADMIN — SODIUM CHLORIDE 500 ML: 9 INJECTION, SOLUTION INTRAVENOUS at 16:38

## 2024-12-01 RX ADMIN — CEFTRIAXONE 1000 MG: 1 INJECTION, POWDER, FOR SOLUTION INTRAMUSCULAR; INTRAVENOUS at 17:11

## 2024-12-01 NOTE — FSED PROVIDER NOTE
EMERGENCY DEPARTMENT ENCOUNTER    Room Number:    Date seen:  2024  Time seen: 16:25 EST  PCP: Skinny Greene MD  Historian:     HPI:  Chief complaint: Cough, fever  Context:Kaveh Vences is a 73 y.o. male who presents to the ED with c/o cough, fever.  Patient reports he had a cough for almost a week now.  Denies any nausea vomiting or diarrhea, chest pain, difficulty breathing other than when coughing.  Patient also reports he has not been drinking much fluid lately and today and started having some mild episodes of dizziness with position change.  Denies any known sick exposures.  Denies any other complaints or concerns.    Timin days  Duration: Constant  Location: Cough  Radiation: Nonradiating  Quality: No pain  Intensity/Severity: 0/10  Associated Symptoms: Persistent cough worse when laying down and at night  Aggravating Factors: None  Alleviating Factors: None  Previous Episodes: None  Treatment before arrival: None    MEDICAL RECORD REVIEW  Yes    ALLERGIES  Patient has no known allergies.    PAST MEDICAL HISTORY  Active Ambulatory Problems     Diagnosis Date Noted    Moderate mitral regurgitation 2018    S/P CABG x 4 2018    CAD (coronary artery disease), autologous vein bypass graft 2018    Congestive heart failure with LV diastolic dysfunction, NYHA class 3 2018    Ischemic cardiomyopathy 2018    Paroxysmal atrial fibrillation 2018    Angina pectoris 2017    Atherosclerosis of coronary artery bypass graft(s), unspecified, with other forms of angina pectoris 2018    Cardiomyopathy 10/04/2013    Coronary heart disease 2019    Encounter for follow-up examination after completed treatment for conditions other than malignant neoplasm 2016    Herniated lumbar intervertebral disc 2018    Hyperlipidemia 2019    Hypertension 2019    Left bundle branch block 2016    Low back pain 2018    Osteoarthritis of  lumbosacral spine with radiculopathy 12/06/2018    Pulmonary hypertension 07/15/2016    Type 2 diabetes mellitus 12/02/2019    Valvular heart disease 03/23/2018    Atherosclerosis of autologous vein coronary artery bypass graft(s) with other forms of angina pectoris 03/14/2019    Mitral regurgitation 01/19/2016    Atrial fibrillation 12/02/2019    Chest pain 08/03/2022     Resolved Ambulatory Problems     Diagnosis Date Noted    No Resolved Ambulatory Problems     Past Medical History:   Diagnosis Date    Coronary artery disease     Diabetes mellitus     Gallstones     Injury of back     Neck pain     Rheumatoid arthritis     Sleep apnea     Valvular disease        PAST SURGICAL HISTORY  Past Surgical History:   Procedure Laterality Date    BACK SURGERY  01/2019    CARDIAC CATHETERIZATION  2006, 2009, 2012, 2018    CARDIAC CATHETERIZATION Right 06/19/2023    Procedure: Left Heart Cath;  Surgeon: Cris Mcneill MD;  Location: [facility] CATH INVASIVE LOCATION;  Service: Cardiovascular;  Laterality: Right;    CHOLECYSTECTOMY  12/22/2015    CORONARY ANGIOPLASTY  2006, 2009    CORONARY ARTERY BYPASS GRAFT  05/11/2012    x2    ROTATOR CUFF REPAIR Left        FAMILY HISTORY  Family History   Problem Relation Age of Onset    Coronary artery disease Father     Coronary artery disease Brother     Heart attack Brother     Stroke Brother     Diabetes Maternal Uncle        SOCIAL HISTORY  Social History     Socioeconomic History    Marital status:    Tobacco Use    Smoking status: Never     Passive exposure: Never    Smokeless tobacco: Never   Vaping Use    Vaping status: Never Used   Substance and Sexual Activity    Alcohol use: No    Drug use: No    Sexual activity: Yes     Partners: Female       REVIEW OF SYSTEMS  Review of Systems    All systems reviewed and negative except for those discussed in HPI.     PHYSICAL EXAM    I have reviewed the triage vital signs and nursing notes.    ED Triage Vitals [12/01/24  1612]   Temp Heart Rate Resp BP SpO2   100.1 °F (37.8 °C) 113 20 160/82 94 %      Temp src Heart Rate Source Patient Position BP Location FiO2 (%)   -- Monitor Sitting Right arm --       Physical Exam  Vitals and nursing note reviewed.   Constitutional:       Appearance: Normal appearance.   HENT:      Head: Normocephalic and atraumatic.      Nose: Nose normal.      Mouth/Throat:      Mouth: Mucous membranes are dry.   Eyes:      Extraocular Movements: Extraocular movements intact.      Conjunctiva/sclera: Conjunctivae normal.   Neck:      Vascular: No carotid bruit.   Cardiovascular:      Rate and Rhythm: Tachycardia present.      Pulses: Normal pulses.      Heart sounds: Normal heart sounds.   Pulmonary:      Effort: Pulmonary effort is normal. No respiratory distress.      Breath sounds: No stridor. No wheezing, rhonchi or rales.      Comments: Lung sounds diminished right upper lobe, otherwise clear throughout  Chest:      Chest wall: No tenderness.   Abdominal:      General: Bowel sounds are normal. There is no distension.      Palpations: Abdomen is soft. There is no mass.      Tenderness: There is no abdominal tenderness. There is no right CVA tenderness, left CVA tenderness, guarding or rebound.      Hernia: No hernia is present.   Musculoskeletal:         General: Normal range of motion.      Cervical back: Normal range of motion and neck supple. No rigidity or tenderness.   Lymphadenopathy:      Cervical: No cervical adenopathy.   Skin:     General: Skin is warm and dry.      Capillary Refill: Capillary refill takes less than 2 seconds.   Neurological:      General: No focal deficit present.      Mental Status: He is alert and oriented to person, place, and time.   Psychiatric:         Mood and Affect: Mood normal.         Behavior: Behavior normal.         Thought Content: Thought content normal.         Judgment: Judgment normal.         Vital signs and nursing notes reviewed.        LAB RESULTS  Recent  Results (from the past 24 hours)   COVID-19 and FLU A/B PCR, 1 HR TAT - Swab, Nasopharynx    Collection Time: 12/01/24  4:14 PM    Specimen: Nasopharynx; Swab   Result Value Ref Range    COVID19 Not Detected Not Detected - Ref. Range    Influenza A PCR Not Detected Not Detected    Influenza B PCR Not Detected Not Detected   Comprehensive Metabolic Panel    Collection Time: 12/01/24  4:37 PM    Specimen: Blood   Result Value Ref Range    Glucose 250 (H) 65 - 99 mg/dL    BUN 18 8 - 23 mg/dL    Creatinine 0.95 0.76 - 1.27 mg/dL    Sodium 136 136 - 145 mmol/L    Potassium 4.0 3.5 - 5.2 mmol/L    Chloride 98 98 - 107 mmol/L    CO2 28.6 22.0 - 29.0 mmol/L    Calcium 9.0 8.6 - 10.5 mg/dL    Total Protein 6.1 6.0 - 8.5 g/dL    Albumin 3.9 3.5 - 5.2 g/dL    ALT (SGPT) 15 1 - 41 U/L    AST (SGOT) 10 1 - 40 U/L    Alkaline Phosphatase 61 39 - 117 U/L    Total Bilirubin 0.6 0.0 - 1.2 mg/dL    Globulin 2.2 gm/dL    A/G Ratio 1.8 g/dL    BUN/Creatinine Ratio 18.9 7.0 - 25.0    Anion Gap 9.4 5.0 - 15.0 mmol/L    eGFR 84.0 >60.0 mL/min/1.73   CBC Auto Differential    Collection Time: 12/01/24  4:37 PM    Specimen: Blood   Result Value Ref Range    WBC 9.32 3.40 - 10.80 10*3/mm3    RBC 4.16 4.14 - 5.80 10*6/mm3    Hemoglobin 13.7 13.0 - 17.7 g/dL    Hematocrit 41.5 37.5 - 51.0 %    MCV 99.8 (H) 79.0 - 97.0 fL    MCH 32.9 26.6 - 33.0 pg    MCHC 33.0 31.5 - 35.7 g/dL    RDW 12.6 12.3 - 15.4 %    RDW-SD 46.7 37.0 - 54.0 fl    MPV 8.3 6.0 - 12.0 fL    Platelets 150 140 - 450 10*3/mm3    Neutrophil % 83.1 (H) 42.7 - 76.0 %    Lymphocyte % 7.5 (L) 19.6 - 45.3 %    Monocyte % 8.7 5.0 - 12.0 %    Eosinophil % 0.3 0.3 - 6.2 %    Basophil % 0.2 0.0 - 1.5 %    Immature Grans % 0.2 0.0 - 0.5 %    Neutrophils, Absolute 7.74 (H) 1.70 - 7.00 10*3/mm3    Lymphocytes, Absolute 0.70 0.70 - 3.10 10*3/mm3    Monocytes, Absolute 0.81 0.10 - 0.90 10*3/mm3    Eosinophils, Absolute 0.03 0.00 - 0.40 10*3/mm3    Basophils, Absolute 0.02 0.00 - 0.20 10*3/mm3     Immature Grans, Absolute 0.02 0.00 - 0.05 10*3/mm3       Ordered the above labs and independently reviewed the results.      RADIOLOGY RESULTS  XR Chest 2 View    Result Date: 12/1/2024  XR CHEST 2 VW Date of Exam: 12/1/2024 4:43 PM EST Indication: cough, fever Comparison: 6/17/2023 Findings: Heart size and pulmonary vessels are within normal limits. Patient is status post median sternotomy. There are calcified granulomas scattered within both lungs. There is some new ill-defined airspace disease laterally within the right midlung favored to be secondary to pneumonia. Repeat radiograph would be recommended in 2 to 4 weeks to document resolution. Left lung is clear. No pleural effusion or pneumothorax. Degenerative changes are noted of the spine.     Impression: 1. Small area of ill-defined airspace disease laterally within the right midlung likely pneumonia. Repeat chest radiograph would be recommended in 2 to 4 weeks to document resolution. Electronically Signed: Darryl Choudhury MD  12/1/2024 4:59 PM EST  Workstation ID: [id number]        I ordered the above noted radiological studies. Independently reviewed by me and discussed with radiologist.  See dictation above for official radiology interpretation.      Orders placed during this visit:  Orders Placed This Encounter   Procedures    COVID-19 and FLU A/B PCR, 1 HR TAT - Swab, Nasopharynx    XR Chest 2 View    Comprehensive Metabolic Panel    CBC Auto Differential    CBC & Differential    ED Acknowledgement Form Needed;              Medical Decision Making  Vital signs independently interpreted by me, patient is slightly tachycardic with heart rate of 113, low-grade fever of 100.1, other vital signs within normal limits  Check labs and two-view chest x-ray to rule out COVID, flu or pneumonia, patient does have some symptoms of mild dehydration will give a small fluid bolus IV    Labs independently interpreted by me, labs are unremarkable except for glucose which  is 250, patient is a known type II diabetic.  Chest x-ray, as interpreted by radiology  1. Small area of ill-defined airspace disease laterally within the right midlung likely pneumonia. Repeat chest radiograph would be recommended in 2 to 4 weeks to document resolution.  Give patient 1 g IV Rocephin here and will discharge home with Augmentin and Z-Fan.  Discussed results and plan of care with patient and family, they are agreeable plan of care, all questions have been answered at this time.          Amount and/or Complexity of Data Reviewed  Labs: ordered.  Radiology: ordered.        PROCEDURES    Procedures        MEDICATIONS GIVEN IN ER    Medications   sodium chloride 0.9 % bolus 500 mL (500 mL Intravenous New Bag 12/1/24 1638)   cefTRIAXone (ROCEPHIN) 1,000 mg in sodium chloride 0.9 % 100 mL MBP (1,000 mg Intravenous New Bag 12/1/24 1711)         PROGRESS, DATA ANALYSIS, CONSULTS, AND MEDICAL DECISION MAKING    All labs have been independently reviewed by me.  All radiology studies have been reviewed by me.   EKG's independently reviewed by me.  Discussion below represents my analysis of pertinent findings related to patient's condition, differential diagnosis, treatment plan and final disposition.    DIFFERENTIAL DIAGNOSIS INCLUDE BUT NOT LIMITED TO: Pneumonia, bronchitis, viral illness         AS OF 17:16 EST VITALS:    BP - 149/86  HR - 102  TEMP - 100.1 °F (37.8 °C)  02 SATS - 92%    I rechecked the patient.  I discussed the patient's labs, radiology findings (including all incidental findings), diagnosis, and plan for discharge.  A repeat exam reveals no new worrisome changes from my initial exam findings.  The patient understands that the fact that they are being discharged does not denote that nothing is abnormal, it indicates that no clinical emergency is present and that they must follow-up as directed in order to properly maintain their health.  Follow-up instructions (specifically listed below) and  return to ER precautions were given at this time.  I specifically instructed the patient to follow-up with their PCP.  The patient understands and agrees with the plan, and is ready for discharge.  All questions answered.    Critical care:  Total critical care time of 0 minutes is exclusive of any other billable procedures and includes time spent with direct patient care and observation, retrospective chart review, management of acute condition, and consultation with other medical providers.    DIAGNOSIS  Final diagnoses:   Pneumonia of right middle lobe due to infectious organism         DISPOSITION  Discharge home    Pt masked in first look. I wore a surgical mask throughout my encounters with the pt. I performed hand hygiene on entry into the pt room and upon exit.     Dictated utilizing Dragon dictation     Note Disclaimer: At [facility], we believe that sharing information builds trust and better relationships. You are receiving this note because you recently visited [facility]. It is possible you will see health information before a provider has talked with you about it. This kind of information can be easy to misunderstand. To help you fully understand what it means for your health, we urge you to discuss this note with your provider.

## 2024-12-01 NOTE — DISCHARGE INSTRUCTIONS
I have sent prescriptions for albuterol inhaler, Augmentin and Zithromax to your pharmacy recommend taking these medications as prescribed.  Be sure to stay well-hydrated drinking plenty of fluids.  Recommend using humidifier in the home at night while sleeping for the next several days.  Follow-up with your family doctor within a week if symptoms persist.  Return to the emergency department for worsening symptoms cling persistent fever, increased difficulty breathing, weakness or other concerns

## 2025-01-14 RX ORDER — RANOLAZINE 1000 MG/1
TABLET, EXTENDED RELEASE ORAL
Qty: 180 TABLET | Refills: 0 | Status: SHIPPED | OUTPATIENT
Start: 2025-01-14

## 2025-01-15 ENCOUNTER — TELEPHONE (OUTPATIENT)
Dept: CARDIOLOGY | Facility: CLINIC | Age: 75
End: 2025-01-15
Payer: MEDICARE

## 2025-01-15 NOTE — TELEPHONE ENCOUNTER
Caller: La Vences    Relationship to patient: Emergency Contact    Best call back number: [phone]    Patient is needing: THE LAST TEN DAYS PATIENTS BP HAS BEEN RUNNING HIGHER. TOP NUMBER RUNNING 160-170 AND THEY'RE CHECKING TO SEE IF HE CAN RESTART HIS AMLODPINE AND KEEP TRACK OF HIS BP UNTIL HIS APPOINTMENT.

## 2025-01-15 NOTE — TELEPHONE ENCOUNTER
Message sent to Dr Mcneill. Systolic he is running 150's to 170's but diastolic is in the 70's and 80's.

## 2025-01-27 ENCOUNTER — HOSPITAL ENCOUNTER (OUTPATIENT)
Dept: PAIN MEDICINE | Facility: HOSPITAL | Age: 75
Discharge: HOME | End: 2025-01-27
Payer: MEDICARE

## 2025-01-27 VITALS
HEIGHT: 66 IN | SYSTOLIC BLOOD PRESSURE: 145 MMHG | WEIGHT: 167 LBS | OXYGEN SATURATION: 97 % | BODY MASS INDEX: 26.84 KG/M2 | RESPIRATION RATE: 16 BRPM | TEMPERATURE: 97.1 F | DIASTOLIC BLOOD PRESSURE: 82 MMHG | HEART RATE: 67 BPM

## 2025-01-27 DIAGNOSIS — M47.817 LUMBOSACRAL SPONDYLOSIS WITHOUT MYELOPATHY: Primary | ICD-10-CM

## 2025-01-27 DIAGNOSIS — R52 PAIN: ICD-10-CM

## 2025-01-27 PROCEDURE — 25010000002 BUPIVACAINE (PF) 0.25 % SOLUTION: Performed by: ANESTHESIOLOGY

## 2025-01-27 PROCEDURE — 25010000002 METHYLPREDNISOLONE PER 40 MG: Performed by: ANESTHESIOLOGY

## 2025-01-27 PROCEDURE — 64636 DESTROY L/S FACET JNT ADDL: CPT | Performed by: ANESTHESIOLOGY

## 2025-01-27 PROCEDURE — 77003 FLUOROGUIDE FOR SPINE INJECT: CPT

## 2025-01-27 PROCEDURE — 25010000002 LIDOCAINE PF 1% 1 % SOLUTION: Performed by: ANESTHESIOLOGY

## 2025-01-27 PROCEDURE — 64635 DESTROY LUMB/SAC FACET JNT: CPT | Performed by: ANESTHESIOLOGY

## 2025-01-27 RX ORDER — METHYLPREDNISOLONE ACETATE 40 MG/ML
40 INJECTION, SUSPENSION INTRA-ARTICULAR; INTRALESIONAL; INTRAMUSCULAR; SOFT TISSUE ONCE
Status: COMPLETED | OUTPATIENT
Start: 2025-01-27 | End: 2025-01-27

## 2025-01-27 RX ORDER — BUPIVACAINE HYDROCHLORIDE 2.5 MG/ML
10 INJECTION, SOLUTION EPIDURAL; INFILTRATION; INTRACAUDAL ONCE
Status: COMPLETED | OUTPATIENT
Start: 2025-01-27 | End: 2025-01-27

## 2025-01-27 RX ORDER — LIDOCAINE HYDROCHLORIDE 10 MG/ML
5 INJECTION, SOLUTION EPIDURAL; INFILTRATION; INTRACAUDAL; PERINEURAL ONCE
Status: COMPLETED | OUTPATIENT
Start: 2025-01-27 | End: 2025-01-27

## 2025-01-27 RX ADMIN — BUPIVACAINE HYDROCHLORIDE 10 ML: 2.5 INJECTION, SOLUTION EPIDURAL; INFILTRATION; INTRACAUDAL; PERINEURAL at 10:47

## 2025-01-27 RX ADMIN — LIDOCAINE HYDROCHLORIDE 5 ML: 10 INJECTION, SOLUTION EPIDURAL; INFILTRATION; INTRACAUDAL; PERINEURAL at 10:46

## 2025-01-27 RX ADMIN — LIDOCAINE HYDROCHLORIDE 5 ML: 10 INJECTION, SOLUTION EPIDURAL; INFILTRATION; INTRACAUDAL; PERINEURAL at 10:40

## 2025-01-27 RX ADMIN — METHYLPREDNISOLONE ACETATE 40 MG: 40 INJECTION, SUSPENSION INTRA-ARTICULAR; INTRALESIONAL; INTRAMUSCULAR; INTRASYNOVIAL; SOFT TISSUE at 10:47

## 2025-01-27 NOTE — PROCEDURES
"Subjective    CC back pain  Kaveh Vences is a 74 y.o. male with lumbar spondylosis here for repeat sharifa lumbar RFA.  Off Plavix 7 days    Pain Assessment   Location of Pain: Lower Back, R Hip, L Hip,   Description of Pain: Dull/Aching, Throbbing, Stabbing  Previous Pain Rating :7  Current Pain Ratin  Aggravating Factors: Activity  Alleviating Factors: Rest, Medication    The following portions of the patient's history were reviewed and updated as appropriate: allergies, current medications, past family history, past medical history, past social history, past surgical history and problem list.      Review of Systems  As in HPI  Objective   Physical Exam   Constitutional: No distress.   Pulmonary/Chest: Effort normal.   Vitals reviewed.    /60 (BP Location: Left arm, Patient Position: Sitting)   Pulse 71   Temp 97.1 °F (36.2 °C) (Skin)   Resp 16   Ht 167.6 cm (66\")   Wt 75.8 kg (167 lb)   SpO2 97%   BMI 26.95 kg/m²       Assessment & Plan    underwent repeat sharifa lumbar RFA    RTC 6 weeks    DATE OF PROCEDURE:   2025      PREOPERATIVE DIAGNOSIS:   Lumbar spondylosis without myelopathy     POSTOPERATIVE DIAGNOSIS: same     PROCEDURE PERFORMED:  Bilateral lumbar Sacral RFTC at  L4/L5, L5/S1.     The patient presents with a history of lumbosacral spondylosis  at level [  L4/L5 ] [ L5/ S1 ].  The patient presents today for lumbosacral RFTC.  The patient understands the risks and benefits of the procedure and wishes to proceed.  The patient was seen in the preoperative area.  Patient's consent was obtained and updated.  Vitals were taken.  Patient was then brought to the procedure suite and placed in a prone position.  The appropriate anatomic area was widely prepped with Chloraprep and draped in a sterile fashion.  Noninvasive monitoring per routine anesthesia protocol was placed.  Under fluoroscopic guidance an AP view with caudad cephaled tilt was obtained.  A 20 guage RFTC cannula was passed through " skin anesthetized with 1% Lidocaine without epinephrine.  The needle tip was guided to the superior medial aspect of the transverse process at [  L4 ][  L5 and  sacral ala ] bilaterally.   Motor stimulation was undertaken at 2.0 mAmps at 2 Hertz. At no point was motor stimulation or sensory stimulation noted in a radicular fashion.  Impedence range 200's. Prior to ablation, each level was anesthetized with 2mL of 1% Lidocaine without epinephrine. The medial branch nerves were then ablated at 80* C for 90 seconds each .  Following ablation, each level was injected with 1 mL of  expiration containing 1 mL of 40 mg Depo-Medrol and 4 mL of 0.25% bupivacaine and the RFTC cannula removed.  A sterile dressing was placed over the puncture site.     The patient tolerated the procedure with no complications. They were then brought to the post procedure area where they recovered nicely.      Discharge  The patient will be discharged home in stable condition.  Patient understands to contact the Center with any post procedure questions or concerns.  Discharge instructions given by nursing staff.

## 2025-01-27 NOTE — DISCHARGE INSTRUCTIONS
Radiofrequency Lesioning, Care After    Refer to this sheet in the next few weeks. These instructions provide you with information about caring for yourself after your procedure. Your health care provider may also give you more specific instructions. Your treatment has been planned according to current medical practices, but problems sometimes occur. Call your health care provider if you have any problems or questions after your procedure.  What can I expect after the procedure?  After the procedure, it is common to have:  Pain from the burned nerve.  You may feel a burning sensation for up to 1-2 weeks after the procedure  Temporary numbness at the site  Your leg/legs may be weak or feel numb after the procedure until the numbing medication wears off.  When you are up and walking, have assistance to prevent falling.     Follow these instructions at home:  Take over-the-counter and prescription medicines only as told by your health care provider.  Return to your normal activities as told by your health care provider. Ask your health care provider what activities are safe for you.  Pay close attention to how you feel after the procedure. If you start to have pain, write down when it hurts and how it feels. This will help you and your health care provider to know if you need an additional treatment.  Check your needle insertion site every day for signs of infection. Watch for:  Redness, swelling, or pain.  Fluid, blood, or pus.  Keep all follow-up visits as told by your health care provider. This is important.  No driving for 24 hrs-make sure you have full sensation in your legs prior to driving.  Avoid using heat on the injection site for 24 hours. You may use ice intermittently if needed by placing a               towel between your skin and the ice bag and using the ice for 20 minutes 2-3 times a day.  Do not take baths, swim or use a hot tub for 24 hours.  Leave your band-aids on for 24 hours and keep them  dry.    Contact a health care provider if:  Your pain does not get better.  You have redness, swelling, or pain at the needle insertion site.  You have fluid, blood, or pus coming from the needle insertion site.  You have a fever over 101 degrees.  You have new numb.ness in your arm or leg after the procedure    Get help right away if:  You develop sudden, severe pain.  You develop numbness or tingling near the procedure site that does not go away.  This information is not intended to replace advice given to you by your health care provider. Make sure you discuss any questions you have with your health care provider.  Document Released: 08/16/2012 Document Revised: 05/25/2017 Document Reviewed: 01/25/2016  [employer] Interactive Patient Education © 2019 [employer]

## 2025-01-27 NOTE — ADDENDUM NOTE
Encounter addended by: Geni Curran RN on: 1/27/2025 12:03 PM   Actions taken: MAR administration accepted

## 2025-01-28 ENCOUNTER — TELEPHONE (OUTPATIENT)
Dept: PAIN MEDICINE | Facility: HOSPITAL | Age: 75
End: 2025-01-28
Payer: MEDICARE

## 2025-01-28 NOTE — TELEPHONE ENCOUNTER
Post procedure phone call completed.  Pt states they are doing good and denies questions or concerns. Reported pain level of 1.

## 2025-02-04 ENCOUNTER — OFFICE VISIT (OUTPATIENT)
Dept: CARDIOLOGY | Facility: CLINIC | Age: 75
End: 2025-02-04
Payer: MEDICARE

## 2025-02-04 VITALS
OXYGEN SATURATION: 95 % | BODY MASS INDEX: 28.09 KG/M2 | SYSTOLIC BLOOD PRESSURE: 140 MMHG | HEIGHT: 66 IN | DIASTOLIC BLOOD PRESSURE: 80 MMHG | HEART RATE: 69 BPM | WEIGHT: 174.8 LBS

## 2025-02-04 DIAGNOSIS — I25.718 CORONARY ARTERY DISEASE OF AUTOLOGOUS VEIN BYPASS GRAFT WITH STABLE ANGINA PECTORIS: ICD-10-CM

## 2025-02-04 DIAGNOSIS — E78.2 MIXED HYPERLIPIDEMIA: ICD-10-CM

## 2025-02-04 DIAGNOSIS — I25.718 ATHEROSCLEROSIS OF AUTOLOGOUS VEIN CORONARY ARTERY BYPASS GRAFT(S) WITH OTHER FORMS OF ANGINA PECTORIS: Primary | ICD-10-CM

## 2025-02-04 DIAGNOSIS — I50.30 CONGESTIVE HEART FAILURE WITH LV DIASTOLIC DYSFUNCTION, NYHA CLASS 3: ICD-10-CM

## 2025-02-04 DIAGNOSIS — I44.7 LEFT BUNDLE BRANCH BLOCK: ICD-10-CM

## 2025-02-04 DIAGNOSIS — I25.118 CORONARY ARTERY DISEASE OF NATIVE ARTERY OF NATIVE HEART WITH STABLE ANGINA PECTORIS: ICD-10-CM

## 2025-02-04 RX ORDER — LISINOPRIL 20 MG/1
20 TABLET ORAL 2 TIMES DAILY
Qty: 180 TABLET | Refills: 3 | Status: SHIPPED | OUTPATIENT
Start: 2025-02-04

## 2025-02-04 RX ORDER — NITROGLYCERIN 0.4 MG/1
0.4 TABLET SUBLINGUAL
Qty: 25 TABLET | Refills: 2 | Status: SHIPPED | OUTPATIENT
Start: 2025-02-04

## 2025-02-04 NOTE — PROGRESS NOTES
Cardiology Office Visit      Encounter Date:  02/04/2025    Patient ID:   Kaveh Vences is a 74 y.o. male.      Reason For Followup:  Coronary artery disease  Hypertension  Hyperlipidemia      Brief Clinical History:  Dear Skinny Harper MD    I had the pleasure of seeing Kaveh Vences today. As you are well aware, this is a 74 y.o. male with history of diabetes,  coronary artery disease, status post previous CABG in 2012, and PCI stenting.     cardiac catheterization with graft angiography showed LV ejection fraction of 40%, severe native three-vessel coronary disease with 90% calcified stenosis involving the distal left main involving the ostium of the LAD significant 99% lesion of left circumflex coronary artery.  100% occlusion of the nondominant right coronary artery was noted as well.  Ashby to the LAD was patent and saphenous vein graft to marginal branch was patent as well. Echocardiogram with LV ejection fraction of 45% with moderate mitral regurgitation      Interval History:  Denies any new cardiac symptoms  Denies any dizziness or syncope  Home blood pressure readings are somewhat elevated  No dizziness  No syncope  No orthopnea no PND  No weight gain  No chest discomfort      Interpretation Summary    Left ventricular wall thickness is consistent with mild concentric hypertrophy.  Estimated left ventricular EF = 60% Left ventricular systolic function is normal.  There is calcification of the aortic valve mainly affecting the left coronary cusp(s).  Left ventricular diastolic function is consistent with (grade I) impaired relaxation.  Mild dilation of the aortic root is present.  Estimated right ventricular systolic pressure from tricuspid regurgitation is normal (<35 mmHg).    Interpretation Summary    Myocardial perfusion study did not show any evidence of any significant reversible ischemia  Myocardial perfusion study shows moderate size severe intensity mostly fixed perfusion defect involving the  anterior anteroapical wall and anteroseptal wall likely secondary to underlying attenuation artifact from left bundle branch block  Left ventricular ejection fraction is normal. (Calculated EF = 60%).  Impressions are consistent with an intermediate risk study.  Clinical correlation is recommended        Assessment & Plan    Impressions:  Coronary artery disease status post coronary artery bypass surgery  Hypertension  Hyperlipidemia  Sinus bradycardia with a left bundle branch block  Peripheral vascular disease/altered moderate obstructive carotid artery disease  Mild to moderate mitral regurgitation  Diabetes mellitus/most recent hemoglobin A1c was optimal  Postop atrial fibrillation currently maintaining sinus rhythm  Bilateral carotid stenosis in the moderate range  Intermittent asymptomatic bradycardia and also intermittent tachycardia  Stable angina  Mild bilateral carotid stenosis  Medical management for coronary artery disease recent cath films reviewed and discussed with patient    Recommendations:    Good functional capacity  Discontinue metoprolol  Close monitoring of heart rate and blood pressure at home  Monitoring of heart rate on Apple Watch  Continued aggressive risk factor modification  Continue current medical therapy with aspirin 81 mg p.o. once a day Plavix 75 mg p.o. once a day  Lipitor 20 mg p.o. once a day lisinopril 20 mg p.o. bid a day isosorbide 30 mg p.o. once a day Ranexa 1000 mg p.o. twice daily  Prior labs and medications reviewed and discussed with patient  Patient was recently started by primary care physician on beta-blocker but patient had symptomatic bradycardia requiring discontinuation of beta-blockers in the past  Will continue Norvasc 5 mg p.o. once a day for blood pressure control and also switch lisinopril from 40 mg p.o. once a day to 20 mg p.o. twice daily to see if that will help with the better control of blood pressures  Home blood pressure readings labs medications  "reviewed and discussed the patient and family  Recent labs and workup reviewed and discussed with patient  Follow-up in office 6 months        Vitals:  Vitals:    02/04/25 1116   BP: 140/80   Pulse: 69   SpO2: 95%   Weight: 79.3 kg (174 lb 12.8 oz)   Height: 167.6 cm (66\")       Physical Exam:    General: Alert, cooperative, no distress, appears stated age  Head:  Normocephalic, atraumatic, mucous membranes moist  Eyes:  Conjunctiva/corneas clear, EOM's intact     Neck:  Supple,  no adenopathy;      Lungs: Clear to auscultation bilaterally, no wheezes rhonchi rales are noted  Chest wall: No tenderness  Heart::  Regular rate and rhythm, S1 and S2 normal, no murmur, rub or gallop  Abdomen: Soft, non-tender, nondistended bowel sounds active  Extremities: No cyanosis, clubbing, or edema  Pulses: 2+ and symmetric all extremities  Skin:  No rashes or lesions  Neuro/psych: A&O x3. CN II through XII are grossly intact with appropriate affect              Lab Results   Component Value Date    GLUCOSE 250 (H) 12/01/2024    BUN 18 12/01/2024    CREATININE 0.95 12/01/2024    EGFR 84.0 12/01/2024    BCR 18.9 12/01/2024    K 4.0 12/01/2024    CO2 28.6 12/01/2024    CALCIUM 9.0 12/01/2024    ALBUMIN 3.9 12/01/2024    BILITOT 0.6 12/01/2024    AST 10 12/01/2024    ALT 15 12/01/2024     Results for orders placed during the hospital encounter of 08/03/22    Adult Transthoracic Echo Complete W/ Cont if Necessary Per Protocol    Interpretation Summary  · Left ventricular wall thickness is consistent with mild concentric hypertrophy.  · Estimated left ventricular EF = 60% Left ventricular systolic function is normal.  · There is calcification of the aortic valve mainly affecting the left coronary cusp(s).  · Left ventricular diastolic function is consistent with (grade I) impaired relaxation.  · Mild dilation of the aortic root is present.  · Estimated right ventricular systolic pressure from tricuspid regurgitation is normal (<35 " mmHg).     Results for orders placed during the hospital encounter of 06/19/23    Cardiac Catheterization/Vascular Study    Conclusion  Table formatting from the original result was not included.  Cardiac Catheterization Operative Report    Kaveh Vences  [id number]  6/19/2023  @PCP@    He underwent cardiac catheterization.    Indications for the procedure include: chest pain.    Procedure Details:  The risks, benefits, complications, treatment options, and expected outcomes were discussed with the patient. The patient and/or family concurred with the proposed plan, giving informed consent.    After informed consent the patient was brought to the cath lab after appropriate IV hydration was begun and oral premedication was given. He was further sedated with fentanyl. He was prepped and draped in the usual manner. Using the modified Seldinger access technique, a 6 [place] sheath was placed in the femoral artery. A left heart catheterization with coronary arteriography was performed. Findings are discussed below.  Same JR4 catheter and also 5 [place] IMT and IM catheters and also 5 [place] AL-1 diagnostic catheter were used for selective engagement of the vein graft to the marginal and LIMA to the LAD.    After the procedure was completed, sedation was stopped and the sheaths and catheters were all removed. Hemostasis was achieved per established hospital protocols.    Conscious sedation:  Conscious sedation was performed according to protocol.  I supervised and directed an independent trained observer with the assistance of monitoring the patient's level of consciousness and physiologic status throughout the procedure.  Intraoperative service time was 90 minutes.    Findings:    Hemodynamics Central aortic pressure systolic 125 diastolic 65 with a mean pressure of 90 mmHg  LV end-diastolic pressure of 8 mmHg  There was no gradient across the aortic valve on the pullback of the pigtail catheter  Left Main Left main is 100%  "occluded in the midportion  RCA Right coronary artery is a nondominant mid diffuse 90% stenosis angiographically unchanged from before  % ostial occlusion  Circ 100% ostial occlusion  SVG(s) Vein graft to marginal is patent with no significant stenosis involving the ostium of the anastomotic site  Distal portion of the body of this vein graft has angiographic focal area of 50% stenosis  This vein graft supplies the marginal branch that retrogradely fills the rest of the left circumflex system  HELENE Patent LIMA to the LAD without any significant stenosis involving the ostium/body at the anastomotic site  LAD retrogradely fills about the diagonal branches  LV Not done  Coronary Dominance Right coronary artery    Estimated Blood Loss:  Minimal    Specimens: None    Complications:  None; patient tolerated the procedure well.    Disposition: PACU - hemodynamically stable.    Condition: stable    Impressions:  Severe native three-vessel coronary artery disease  100% occlusion of the left main  Diffuse 90% stenosis involving the proximal right coronary artery that is nondominant  Patent LIMA to the LAD  Patent vein graft to the marginal  Moderate obstructive disease involving the vein graft to the marginal  Normal left-sided filling pressures    Recommendations:  Medical management for obstructive coronary artery disease  Test results reviewed and discussed the patient and family     No results found for: \"CHOL\", \"CHLPL\", \"TRIG\", \"HDL\", \"LDL\", \"LDLDIRECT\"   Results for orders placed during the hospital encounter of 06/08/23    Stress Test With Myocardial Perfusion One Day    Interpretation Summary    Lexiscan myocardial perfusion study shows mostly fixed perfusion defect involving the anterior anteroseptal anteroapical wall consistent with underlying left bundle branch block during wall motion abnormalities and a perfusion defect than a true reversible ischemia    Left ventricular ejection fraction is mildly reduced " (Calculated EF = 46%).    Impressions are consistent with an intermediate risk study.    Clinical correlation is recommended   Results for orders placed during the hospital encounter of 06/19/23    Mobile Cardiac Outpatient Telemetry    Interpretation Summary  Extended Holter monitor study for symptoms of palpitations  Patient was monitored from a June 20, 2023 to July 19, 2023  Underlying rhythm is sinus rhythm with a minimal heart rate of 50 bpm maximal heart rate of 132 bpm average heart rate of 72 bpm  No atrial fibrillation  No sustained ventricular or supraventricular tachyarrhythmia  No clinically significant pauses  No AV block  Relatively benign study  Clinical correlation is recommended           Objective:          Allergies:  No Known Allergies    Medication Review:     Current Outpatient Medications:     amLODIPine (NORVASC) 5 MG tablet, Take 1 tablet by mouth once daily, Disp: 90 tablet, Rfl: 2    aspirin 81 MG EC tablet, Take 1 tablet by mouth Daily., Disp: , Rfl:     atorvastatin (LIPITOR) 20 MG tablet, Take 1 tablet by mouth once daily, Disp: 90 tablet, Rfl: 1    clopidogrel (PLAVIX) 75 MG tablet, Take 1 tablet by mouth once daily, Disp: 90 tablet, Rfl: 0    dapagliflozin (Farxiga) 5 MG tablet tablet, Take 1 tablet by mouth Daily., Disp: , Rfl:     donepezil (ARICEPT) 5 MG tablet, Take 2 tablets by mouth Daily., Disp: , Rfl:     Farxiga 5 MG tablet tablet, , Disp: , Rfl:     isosorbide mononitrate (IMDUR) 30 MG 24 hr tablet, Take 1 tablet by mouth once daily, Disp: 90 tablet, Rfl: 1    metFORMIN ER (GLUCOPHAGE-XR) 500 MG 24 hr tablet, TAKE 2 TABLETS BY MOUTH TWICE DAILY FOR 30 DAYS, Disp: , Rfl:     nitroglycerin (NITROSTAT) 0.4 MG SL tablet, Place 1 tablet under the tongue Every 5 (Five) Minutes As Needed for Chest Pain. do not exceed a total of 3 doses in 15 minutes, Disp: 25 tablet, Rfl: 2    ranolazine (RANEXA) 1000 MG 12 hr tablet, TAKE 1  BY MOUTH EVERY 12 HOURS, Disp: 180 tablet, Rfl: 0     tiZANidine (ZANAFLEX) 4 MG tablet, Take 1 tablet by mouth Every 8 (Eight) Hours As Needed for Muscle Spasms., Disp: 90 tablet, Rfl: 5    lisinopril (PRINIVIL,ZESTRIL) 20 MG tablet, Take 1 tablet by mouth 2 (Two) Times a Day., Disp: 180 tablet, Rfl: 3    Family History:  Family History   Problem Relation Age of Onset    Coronary artery disease Father     Coronary artery disease Brother     Heart attack Brother     Stroke Brother     Diabetes Maternal Uncle        Past Medical History:  Past Medical History:   Diagnosis Date    Atrial fibrillation     Coronary artery disease     Diabetes mellitus     Gallstones     Hyperlipidemia     Hypertension     Injury of back     Low back pain     Mitral regurgitation     Neck pain     Rheumatoid arthritis     Sleep apnea     Valvular disease        Past surgical History:  Past Surgical History:   Procedure Laterality Date    BACK SURGERY  01/2019    CARDIAC CATHETERIZATION  2006, 2009, 2012, 2018    CARDIAC CATHETERIZATION Right 06/19/2023    Procedure: Left Heart Cath;  Surgeon: Cris Mcneill MD;  Location: [facility] CATH INVASIVE LOCATION;  Service: Cardiovascular;  Laterality: Right;    CHOLECYSTECTOMY  12/22/2015    CORONARY ANGIOPLASTY  2006, 2009    CORONARY ARTERY BYPASS GRAFT  05/11/2012    x2    ROTATOR CUFF REPAIR Left        Social History:  Social History     Socioeconomic History    Marital status:    Tobacco Use    Smoking status: Never     Passive exposure: Never    Smokeless tobacco: Never   Vaping Use    Vaping status: Never Used   Substance and Sexual Activity    Alcohol use: No    Drug use: No    Sexual activity: Yes     Partners: Female       Review of Systems:  The following systems were reviewed as they relate to the cardiovascular system: Constitutional, Eyes, ENT, Cardiovascular, Respiratory, Gastrointestinal, Integumentary, Neurological, Psychiatric, Hematologic, Endocrine, Musculoskeletal, and Genitourinary. The pertinent cardiovascular  findings are reported above with all other cardiovascular points within those systems being negative.    Diagnostic Study Review:     Current Electrocardiogram:    ECG 12 Lead    Date/Time: 2/4/2025 11:55 AM  Performed by: Cris Mcneill MD    Authorized by: Cris Mcneill MD  Comparison: compared with previous ECG   Similar to previous ECG  Rhythm: sinus rhythm  Rate: normal  BPM: 69  Conduction: left bundle branch block  QRS axis: normal    Clinical impression: abnormal EKG                NOTE: The following portions of the patient's history were reviewed and updated this visit as appropriate: allergies, current medications, past family history, past medical history, past social history, past surgical history and problem list.

## 2025-02-11 RX ORDER — CLOPIDOGREL BISULFATE 75 MG/1
TABLET ORAL
Qty: 90 TABLET | Refills: 2 | Status: SHIPPED | OUTPATIENT
Start: 2025-02-11

## 2025-03-03 RX ORDER — ATORVASTATIN CALCIUM 20 MG/1
TABLET, FILM COATED ORAL
Qty: 90 TABLET | Refills: 1 | Status: SHIPPED | OUTPATIENT
Start: 2025-03-03

## 2025-03-20 RX ORDER — ISOSORBIDE MONONITRATE 30 MG/1
30 TABLET, EXTENDED RELEASE ORAL DAILY
Qty: 90 TABLET | Refills: 0 | Status: SHIPPED | OUTPATIENT
Start: 2025-03-20

## 2025-03-27 ENCOUNTER — PATIENT MESSAGE (OUTPATIENT)
Dept: CARDIOLOGY | Facility: CLINIC | Age: 75
End: 2025-03-27
Payer: MEDICARE

## 2025-04-02 ENCOUNTER — HOSPITAL ENCOUNTER (EMERGENCY)
Facility: HOSPITAL | Age: 75
Discharge: HOME | End: 2025-04-02
Attending: EMERGENCY MEDICINE
Payer: MEDICARE

## 2025-04-02 ENCOUNTER — APPOINTMENT (OUTPATIENT)
Dept: GENERAL RADIOLOGY | Facility: HOSPITAL | Age: 75
End: 2025-04-02
Payer: MEDICARE

## 2025-04-02 VITALS
SYSTOLIC BLOOD PRESSURE: 97 MMHG | BODY MASS INDEX: 25.39 KG/M2 | HEART RATE: 70 BPM | WEIGHT: 161.8 LBS | TEMPERATURE: 97.6 F | DIASTOLIC BLOOD PRESSURE: 55 MMHG | HEIGHT: 67 IN | OXYGEN SATURATION: 96 % | RESPIRATION RATE: 18 BRPM

## 2025-04-02 DIAGNOSIS — R53.1 GENERALIZED WEAKNESS: Primary | ICD-10-CM

## 2025-04-02 LAB
ALBUMIN SERPL-MCNC: 3.9 G/DL (ref 3.5–5.2)
ALBUMIN/GLOB SERPL: 2 G/DL
ALP SERPL-CCNC: 55 U/L (ref 39–117)
ALT SERPL W P-5'-P-CCNC: 20 U/L (ref 1–41)
ANION GAP SERPL CALCULATED.3IONS-SCNC: 5.3 MMOL/L (ref 5–15)
AST SERPL-CCNC: 11 U/L (ref 1–40)
BASOPHILS # BLD AUTO: 0.03 10*3/MM3 (ref 0–0.2)
BASOPHILS NFR BLD AUTO: 0.5 % (ref 0–1.5)
BILIRUB SERPL-MCNC: 0.6 MG/DL (ref 0–1.2)
BILIRUB UR QL STRIP: NEGATIVE
BUN SERPL-MCNC: 30 MG/DL (ref 8–23)
BUN/CREAT SERPL: 34.5 (ref 7–25)
CALCIUM SPEC-SCNC: 8.9 MG/DL (ref 8.6–10.5)
CHLORIDE SERPL-SCNC: 107 MMOL/L (ref 98–107)
CLARITY UR: CLEAR
CO2 SERPL-SCNC: 26.7 MMOL/L (ref 22–29)
COLOR UR: YELLOW
CREAT SERPL-MCNC: 0.87 MG/DL (ref 0.76–1.27)
DEPRECATED RDW RBC AUTO: 47 FL (ref 37–54)
EGFRCR SERPLBLD CKD-EPI 2021: 90 ML/MIN/1.73
EOSINOPHIL # BLD AUTO: 0.06 10*3/MM3 (ref 0–0.4)
EOSINOPHIL NFR BLD AUTO: 0.9 % (ref 0.3–6.2)
ERYTHROCYTE [DISTWIDTH] IN BLOOD BY AUTOMATED COUNT: 12.7 % (ref 12.3–15.4)
FLUAV SUBTYP SPEC NAA+PROBE: NOT DETECTED
FLUBV RNA ISLT QL NAA+PROBE: NOT DETECTED
GLOBULIN UR ELPH-MCNC: 2 GM/DL
GLUCOSE SERPL-MCNC: 159 MG/DL (ref 65–99)
GLUCOSE UR STRIP-MCNC: ABNORMAL MG/DL
HCT VFR BLD AUTO: 41.2 % (ref 37.5–51)
HGB BLD-MCNC: 13.6 G/DL (ref 13–17.7)
HGB UR QL STRIP.AUTO: NEGATIVE
IMM GRANULOCYTES # BLD AUTO: 0.06 10*3/MM3 (ref 0–0.05)
IMM GRANULOCYTES NFR BLD AUTO: 0.9 % (ref 0–0.5)
KETONES UR QL STRIP: NEGATIVE
LEUKOCYTE ESTERASE UR QL STRIP.AUTO: NEGATIVE
LYMPHOCYTES # BLD AUTO: 0.95 10*3/MM3 (ref 0.7–3.1)
LYMPHOCYTES NFR BLD AUTO: 14.5 % (ref 19.6–45.3)
MAGNESIUM SERPL-MCNC: 1.7 MG/DL (ref 1.6–2.4)
MCH RBC QN AUTO: 32.8 PG (ref 26.6–33)
MCHC RBC AUTO-ENTMCNC: 33 G/DL (ref 31.5–35.7)
MCV RBC AUTO: 99.3 FL (ref 79–97)
MONOCYTES # BLD AUTO: 0.6 10*3/MM3 (ref 0.1–0.9)
MONOCYTES NFR BLD AUTO: 9.2 % (ref 5–12)
NEUTROPHILS NFR BLD AUTO: 4.85 10*3/MM3 (ref 1.7–7)
NEUTROPHILS NFR BLD AUTO: 74 % (ref 42.7–76)
NITRITE UR QL STRIP: NEGATIVE
NT-PROBNP SERPL-MCNC: 179 PG/ML (ref 0–1800)
PH UR STRIP.AUTO: <=5 [PH] (ref 5–8)
PLATELET # BLD AUTO: 168 10*3/MM3 (ref 140–450)
PMV BLD AUTO: 8.6 FL (ref 6–12)
POTASSIUM SERPL-SCNC: 4.4 MMOL/L (ref 3.5–5.2)
PROT SERPL-MCNC: 5.9 G/DL (ref 6–8.5)
PROT UR QL STRIP: NEGATIVE
QT INTERVAL: 461 MS
QTC INTERVAL: 433 MS
RBC # BLD AUTO: 4.15 10*6/MM3 (ref 4.14–5.8)
SARS-COV-2 RNA RESP QL NAA+PROBE: NOT DETECTED
SODIUM SERPL-SCNC: 139 MMOL/L (ref 136–145)
SP GR UR STRIP: >=1.03 (ref 1–1.03)
TROPONIN T SERPL HS-MCNC: 17 NG/L
UROBILINOGEN UR QL STRIP: ABNORMAL
WBC NRBC COR # BLD AUTO: 6.55 10*3/MM3 (ref 3.4–10.8)

## 2025-04-02 PROCEDURE — 99283 EMERGENCY DEPT VISIT LOW MDM: CPT | Performed by: EMERGENCY MEDICINE

## 2025-04-02 PROCEDURE — 93010 ELECTROCARDIOGRAM REPORT: CPT | Performed by: EMERGENCY MEDICINE

## 2025-04-02 PROCEDURE — 71046 X-RAY EXAM CHEST 2 VIEWS: CPT

## 2025-04-02 PROCEDURE — 36415 COLL VENOUS BLD VENIPUNCTURE: CPT

## 2025-04-02 PROCEDURE — 99284 EMERGENCY DEPT VISIT MOD MDM: CPT

## 2025-04-02 PROCEDURE — 87636 SARSCOV2 & INF A&B AMP PRB: CPT | Performed by: EMERGENCY MEDICINE

## 2025-04-02 PROCEDURE — 84484 ASSAY OF TROPONIN QUANT: CPT | Performed by: EMERGENCY MEDICINE

## 2025-04-02 PROCEDURE — 80053 COMPREHEN METABOLIC PANEL: CPT | Performed by: EMERGENCY MEDICINE

## 2025-04-02 PROCEDURE — 81003 URINALYSIS AUTO W/O SCOPE: CPT | Performed by: EMERGENCY MEDICINE

## 2025-04-02 PROCEDURE — 83880 ASSAY OF NATRIURETIC PEPTIDE: CPT | Performed by: EMERGENCY MEDICINE

## 2025-04-02 PROCEDURE — 83735 ASSAY OF MAGNESIUM: CPT | Performed by: EMERGENCY MEDICINE

## 2025-04-02 PROCEDURE — 85025 COMPLETE CBC W/AUTO DIFF WBC: CPT | Performed by: EMERGENCY MEDICINE

## 2025-04-02 PROCEDURE — 93005 ELECTROCARDIOGRAM TRACING: CPT | Performed by: EMERGENCY MEDICINE

## 2025-04-02 RX ORDER — SODIUM CHLORIDE 0.9 % (FLUSH) 0.9 %
10 SYRINGE (ML) INJECTION AS NEEDED
Status: DISCONTINUED | OUTPATIENT
Start: 2025-04-02 | End: 2025-04-02 | Stop reason: HOSPADM

## 2025-04-02 NOTE — DISCHARGE INSTRUCTIONS
Follow-up with your doctor for further medication changes.  Return for further evaluation if you develop chest pain, difficulty breathing or for any concerns.

## 2025-04-02 NOTE — FSED PROVIDER NOTE
Subjective   History of Present Illness  74-year-old male complains of general weakness since changing of his diabetes medications starting a couple weeks ago.  Says he saw his doctor a week ago and they told him he was feeling this way they changed her medications again, they have not followed back up with them even though he continues to feel this way.  Denies chest pain denies shortness of breath.  He has no vomiting but admits to some nausea, denies abdominal pain no urinary frequency no constipation or diarrhea.  Denies any focal weakness whatsoever.  Review of Systems  As noted in HPI  Past Medical History:   Diagnosis Date    Atrial fibrillation     Coronary artery disease     Diabetes mellitus     Gallstones     Hyperlipidemia     Hypertension     Injury of back     Low back pain     Mitral regurgitation     Neck pain     Rheumatoid arthritis     Sleep apnea     Valvular disease        No Known Allergies    Past Surgical History:   Procedure Laterality Date    BACK SURGERY  01/2019    CARDIAC CATHETERIZATION  2006, 2009, 2012, 2018    CARDIAC CATHETERIZATION Right 06/19/2023    Procedure: Left Heart Cath;  Surgeon: Cris Mcneill MD;  Location: [facility] CATH INVASIVE LOCATION;  Service: Cardiovascular;  Laterality: Right;    CHOLECYSTECTOMY  12/22/2015    CORONARY ANGIOPLASTY  2006, 2009    CORONARY ARTERY BYPASS GRAFT  05/11/2012    x2    ROTATOR CUFF REPAIR Left        Family History   Problem Relation Age of Onset    Coronary artery disease Father     Coronary artery disease Brother     Heart attack Brother     Stroke Brother     Diabetes Maternal Uncle        Social History     Socioeconomic History    Marital status:    Tobacco Use    Smoking status: Never     Passive exposure: Never    Smokeless tobacco: Never   Vaping Use    Vaping status: Never Used   Substance and Sexual Activity    Alcohol use: No    Drug use: No    Sexual activity: Yes     Partners: Female           Objective    Physical Exam  Nursing notes reviewed.  INITIAL VITAL SIGNS: Reviewed by me.  Pulse ox normal  GENERAL: Alert. Well developed and well nourished. No respiratory distress.  HEAD: Normocephalic.   EYES: No conjunctival injection.  ENT: Oral mucosa is moist.  NECK/BACK: Supple. Full range of motion.  CARDIOVASCULAR: bradycardic rhythm, regular rate,  RESPIRATORY: Non-labored respirations.  EXTREMITIES: No deformity.  SKIN: Warm and dry. No rashes. No diaphoresis.  NEUROLOGIC: Alert. Normal gait    Procedures     EKG         EKG time/Interp time: 1102/1112  Rhythm/Rate: Sinus bradycardia with rate of 53  P waves and KY: Normal KY interval  QRS, axis: Left bundle branch block  ST and T waves: ST changes consistent with intraventricular conduction delay, no significant change from EKG of August 2022  Independently interpreted by me contemporaneously with treatment        ED Course  ED Course as of 04/02/25 1255   Wed Apr 02, 2025   1132 Well-appearing 74-year-old male with mild dementia complains of general malaise since changing of his diabetes medication.  They said he increased his Farxiga.  Denies chest pain denies shortness of breath no fevers no chills no vomiting but does have nausea.  He is in no distress has good strength moves all extremities not concern for stroke.  Will check labs to evaluate for metabolic abnormalities related to his medication changes.  Check chest x-ray, he does also have a significant history of coronary artery disease, will check BNP related to his heart failure as well as troponin to make sure his general fatigue is not cardiac related [RO]   1253 Labs show reassuring CBC, normal magnesium normal BNP normal troponin, metabolic panel without significant electrolyte abnormalities slightly bumped BUN.  Glucose 159.  Urine without evidence for infection.  Chest x-ray had acute findings.  I do not see anything that explains this patient's general malaise and weakness, as he does not seem  weak on exam I see no reason to admit him to the hospital for further workup and he can follow-up with his primary care physician for medication management. [RO]      ED Course User Index  [RO] Cipriano Shaffer MD                                           Medical Decision Making  Problems Addressed:  Generalized weakness: complicated acute illness or injury    Amount and/or Complexity of Data Reviewed  Labs: ordered.  Radiology: ordered.  ECG/medicine tests: ordered.    Risk  Prescription drug management.        Final diagnoses:   Generalized weakness       ED Disposition  ED Disposition       ED Disposition   Discharge    Condition   Good    Comment   --               Skinny Greene  [address] [place] [address] [phone]      To schedule follow-up appointment         Medication List      No changes were made to your prescriptions during this visit.

## 2025-04-10 RX ORDER — RANOLAZINE 1000 MG/1
1 TABLET, EXTENDED RELEASE ORAL EVERY 12 HOURS SCHEDULED
Qty: 180 TABLET | Refills: 0 | Status: SHIPPED | OUTPATIENT
Start: 2025-04-10

## 2025-04-15 ENCOUNTER — OFFICE VISIT (OUTPATIENT)
Dept: PAIN MEDICINE | Facility: CLINIC | Age: 75
End: 2025-04-15
Payer: MEDICARE

## 2025-04-15 VITALS
BODY MASS INDEX: 25.22 KG/M2 | WEIGHT: 161 LBS | SYSTOLIC BLOOD PRESSURE: 128 MMHG | OXYGEN SATURATION: 98 % | HEART RATE: 73 BPM | RESPIRATION RATE: 16 BRPM | DIASTOLIC BLOOD PRESSURE: 77 MMHG

## 2025-04-15 DIAGNOSIS — M47.817 LUMBOSACRAL SPONDYLOSIS WITHOUT MYELOPATHY: ICD-10-CM

## 2025-04-15 DIAGNOSIS — M79.18 MYOFASCIAL PAIN SYNDROME: ICD-10-CM

## 2025-04-15 DIAGNOSIS — M46.1 SACROILIITIS: Primary | ICD-10-CM

## 2025-04-15 RX ORDER — GLIMEPIRIDE 1 MG/1
1 TABLET ORAL
COMMUNITY
Start: 2025-03-28

## 2025-04-15 RX ORDER — SITAGLIPTIN 100 MG/1
100 TABLET, FILM COATED ORAL DAILY
COMMUNITY
Start: 2025-03-18

## 2025-04-15 RX ORDER — MEMANTINE HYDROCHLORIDE 10 MG/1
10 TABLET ORAL DAILY
COMMUNITY
Start: 2025-04-10 | End: 2026-04-10

## 2025-04-15 NOTE — PROGRESS NOTES
Subjective    CC Back pain  Kaveh Vences is a 74 y.o. male with chronic back pain history of decompression/L2-3 microdiscectomy in January 2019/Dr. Brandon, here for f/u.    Complains of bilateral SI pain, back pain below L5, constant worse with standing walking or prolonged activity.  Tried stretching, anti-inflammatory heat without relief.  This is interfering with ADL.  Denies radicular pain.  Reports 70 to 80% relief of axial back pain from repeat lumbar RFA last visit.    Chronic axial back pain radiating to bilateral hip without radicular pain worse with standing walking or prolonged activity.  Denies weakness, saddle anesthesia bladder bowel continence.  Chronic neck pain radiating to head and bilateral shoulders worse at night and associated with muscle spasm headaches.  Denies weakness, balance issues, bladder bowel continence.    Tried physical therapy and medication with marginal relief.  Interfering with ADL and sleep.   Seen neurosurgery, no surgery recommended.    C-spine MRI 2021 Multilevel degenerative disc disease most notably at C5-C6 and C6-C7 with mild spinal canal stenosis. Moderate right-sided neuroforaminal narrowing at C6-C7 with mild left-sided neuroforaminal narrowing.  L-spine x-ray 2020 Good range of motion with flexion and extension.  No evidence of subluxation. Persistent degenerative disc space narrowing at the L3/4 and L5/[id number], unchanged from prior study.   L-spine MRI 2018 degenerative disc disease and mild facet degenerative change. Findings are most pronounced at L2-L3 were is moderate to severe neural foramen narrowing on the left due to the large focal disc extrusion.  mild neural foramen narrowing on the right at the L2-L3 level due to a smaller focal extrusion    Pain Assessment   Location of Pain: Lower Back  Description of Pain: Dull/Aching, Throbbing, Stabbing  Previous Pain Rating :2  Current Pain Rating: 3  Aggravating Factors: Activity  Alleviating Factors: Rest,  Medication    PEG Assessment   What number best describes your pain on average in the past week?3  What number best describes how, during the past week, pain has interfered with your enjoyment of life?1  What number best describes how, during the past week, pain has interfered with your general activity?6    The following portions of the patient's history were reviewed and updated as appropriate: allergies, current medications, past family history, past medical history, past social history, past surgical history and problem list.    Review of Systems   Musculoskeletal:  Positive for arthralgias, back pain, myalgias, neck pain and neck stiffness.   All other systems reviewed and are negative.      Objective   Physical Exam   Constitutional: No distress.   Pulmonary/Chest: Effort normal.   Musculoskeletal:      Cervical back: He exhibits tenderness.      Comments: Positive bilateral Gaenslen, positive bilateral SI compression test, positive bilateral Jackson   Vitals reviewed.    /77   Pulse 73   Resp 16   Wt 73 kg (161 lb)   SpO2 98%   BMI 25.22 kg/m²      PHQ 9 on chart  Opioid risk tool low risk    Assessment & Plan   Diagnoses and all orders for this visit:    1. Sacroiliitis (Primary)  -     SI Joint Injection    2. Lumbosacral spondylosis without myelopathy    3. Myofascial pain syndrome    Summary  Kaveh Vences is a 74 y.o. male with chronic back pain history of decompression/L2-3 microdiscectomy in January 2019/Dr. Brandon, chronic neck pain here for f/u.   Chronic neck pain from DDD spondylosis.  Chronic back pain from postlaminectomy syndrome and chronic polyarthralgia.  Repeat lumbar RFA or cervical CLARENCE as needed.    Complains of bilateral SI pain, back pain below L5, constant worse with standing walking or prolonged activity.  Tried stretching, anti-inflammatory heat without relief.  This is interfering with ADL.  Denies radicular pain.  Will schedule for diagnostic and therapeutic SI joint  injections.  Risks and benefits discussed.      Reports 70 to 80% relief of axial back pain from repeat lumbar RFA last visit.  Continue  Zanaflex at as needed bedtime.    RTC  for procedure or in 6 months

## 2025-04-21 ENCOUNTER — HOSPITAL ENCOUNTER (OUTPATIENT)
Dept: PAIN MEDICINE | Facility: HOSPITAL | Age: 75
Discharge: HOME | End: 2025-04-21
Payer: MEDICARE

## 2025-04-21 VITALS
BODY MASS INDEX: 25.27 KG/M2 | SYSTOLIC BLOOD PRESSURE: 103 MMHG | HEIGHT: 67 IN | OXYGEN SATURATION: 95 % | RESPIRATION RATE: 16 BRPM | TEMPERATURE: 97.9 F | HEART RATE: 66 BPM | DIASTOLIC BLOOD PRESSURE: 70 MMHG | WEIGHT: 161 LBS

## 2025-04-21 DIAGNOSIS — R52 PAIN: ICD-10-CM

## 2025-04-21 DIAGNOSIS — M46.1 SACROILIITIS: Primary | ICD-10-CM

## 2025-04-21 PROCEDURE — 25010000002 BUPIVACAINE (PF) 0.25 % SOLUTION: Performed by: ANESTHESIOLOGY

## 2025-04-21 PROCEDURE — 77003 FLUOROGUIDE FOR SPINE INJECT: CPT

## 2025-04-21 PROCEDURE — 25010000002 METHYLPREDNISOLONE PER 40 MG: Performed by: ANESTHESIOLOGY

## 2025-04-21 PROCEDURE — 25510000001 IOPAMIDOL 41 % SOLUTION: Performed by: ANESTHESIOLOGY

## 2025-04-21 RX ORDER — METHYLPREDNISOLONE ACETATE 40 MG/ML
40 INJECTION, SUSPENSION INTRA-ARTICULAR; INTRALESIONAL; INTRAMUSCULAR; SOFT TISSUE ONCE
Status: COMPLETED | OUTPATIENT
Start: 2025-04-21 | End: 2025-04-21

## 2025-04-21 RX ORDER — IOPAMIDOL 408 MG/ML
3 INJECTION, SOLUTION INTRATHECAL
Status: COMPLETED | OUTPATIENT
Start: 2025-04-21 | End: 2025-04-21

## 2025-04-21 RX ORDER — BUPIVACAINE HYDROCHLORIDE 2.5 MG/ML
10 INJECTION, SOLUTION EPIDURAL; INFILTRATION; INTRACAUDAL; PERINEURAL ONCE
Status: COMPLETED | OUTPATIENT
Start: 2025-04-21 | End: 2025-04-21

## 2025-04-21 RX ADMIN — METHYLPREDNISOLONE ACETATE 40 MG: 40 INJECTION, SUSPENSION INTRA-ARTICULAR; INTRALESIONAL; INTRAMUSCULAR; INTRASYNOVIAL; SOFT TISSUE at 11:17

## 2025-04-21 RX ADMIN — METHYLPREDNISOLONE ACETATE 40 MG: 40 INJECTION, SUSPENSION INTRA-ARTICULAR; INTRALESIONAL; INTRAMUSCULAR; INTRASYNOVIAL; SOFT TISSUE at 11:18

## 2025-04-21 RX ADMIN — IOPAMIDOL 3 ML: 408 INJECTION, SOLUTION INTRATHECAL at 11:17

## 2025-04-21 RX ADMIN — BUPIVACAINE HYDROCHLORIDE 10 ML: 2.5 INJECTION, SOLUTION EPIDURAL; INFILTRATION; INTRACAUDAL; PERINEURAL at 11:18

## 2025-04-24 NOTE — PROCEDURES
"Subjective    CC back pain  Kaveh Vences is a 74 y.o. male with sacroiliitis here for bilateral SI joint injections.  Pain Assessment   Location of Pain: Lower Back, R Hip, L Hip,   Description of Pain: Dull/Aching, Throbbing, Stabbing  Previous Pain Rating :1  Current Pain Rating: 3  Aggravating Factors: Activity  Alleviating Factors: Rest, Medication    The following portions of the patient's history were reviewed and updated as appropriate: allergies, current medications, past family history, past medical history, past social history, past surgical history and problem list.      Review of Systems  As in HPI  Objective   Physical Exam   Constitutional: No distress.   Pulmonary/Chest: Effort normal.   Vitals reviewed.    /70 (BP Location: Left arm, Patient Position: Sitting)   Pulse 66   Temp 97.9 °F (36.6 °C) (Skin)   Resp 16   Ht 170.2 cm (67\")   Wt 73 kg (161 lb)   SpO2 95%   BMI 25.22 kg/m²       Assessment & Plan    underwent bilateral SI joint injection.    RTC 6 weeks    DATE OF PROCEDURE:  4/21/2025    PREOPERATIVE DIAGNOSIS: sacroiliitis    POSTOPERATIVE DIAGNOSIS: same    PROCEDURE PERFORMED: Bilateral SACROILIAC JOINT INJECTION    The patient understands the risks and benefits of the procedure and wishes to proceed. The patient was seen in the preoperative area. Patient's consent was obtained and updated. Vitals were taken. Patient was then brought to the procedure suite and placed in prone position for sacroiliac joint injection. The appropriate anatomic area was widely prepped with Chloraprep and draped in a sterile fashion. Noninvasive monitoring per routine anesthesia protocol was placed. Under fluoroscopic guidance using an AP view, a 22 gauge curved tip spinal needle was passed through skin anesthetized with 1% Lidocaine without epinephrine. The needle tip was guided to the lower pole of the joint using fluoroscopy. 1 mL of  preservative free contrast was injected into the joint to " confirm location. A clear outline was obtained and 5 mL of steroid solution containing 4 mL 0.25% bupivacaine, and 1mL 40mg Depomedrol was injected. The patient tolerated with no dana-procedural complications.  A sterile dressing was placed over the puncture sites.

## 2025-05-01 ENCOUNTER — OFFICE VISIT (OUTPATIENT)
Dept: FAMILY MEDICINE CLINIC | Facility: CLINIC | Age: 75
End: 2025-05-01
Payer: MEDICARE

## 2025-05-01 ENCOUNTER — LAB (OUTPATIENT)
Dept: FAMILY MEDICINE CLINIC | Facility: CLINIC | Age: 75
End: 2025-05-01
Payer: MEDICARE

## 2025-05-01 ENCOUNTER — LAB (OUTPATIENT)
Dept: LAB | Facility: HOSPITAL | Age: 75
End: 2025-05-01
Payer: MEDICARE

## 2025-05-01 VITALS
OXYGEN SATURATION: 99 % | SYSTOLIC BLOOD PRESSURE: 92 MMHG | BODY MASS INDEX: 25.88 KG/M2 | TEMPERATURE: 97.9 F | DIASTOLIC BLOOD PRESSURE: 52 MMHG | HEART RATE: 67 BPM | WEIGHT: 161 LBS | HEIGHT: 66 IN

## 2025-05-01 DIAGNOSIS — E11.65 TYPE 2 DIABETES MELLITUS WITH HYPERGLYCEMIA, UNSPECIFIED WHETHER LONG TERM INSULIN USE: ICD-10-CM

## 2025-05-01 DIAGNOSIS — R53.1 WEAKNESS: ICD-10-CM

## 2025-05-01 DIAGNOSIS — E11.65 TYPE 2 DIABETES MELLITUS WITH HYPERGLYCEMIA, UNSPECIFIED WHETHER LONG TERM INSULIN USE: Primary | ICD-10-CM

## 2025-05-01 DIAGNOSIS — E78.2 MIXED HYPERLIPIDEMIA: ICD-10-CM

## 2025-05-01 DIAGNOSIS — R53.83 OTHER FATIGUE: ICD-10-CM

## 2025-05-01 DIAGNOSIS — I25.708 ATHEROSCLEROSIS OF CORONARY ARTERY BYPASS GRAFT(S), UNSPECIFIED, WITH OTHER FORMS OF ANGINA PECTORIS: ICD-10-CM

## 2025-05-01 DIAGNOSIS — D75.89 MACROCYTIC: ICD-10-CM

## 2025-05-01 DIAGNOSIS — Z13.21 ENCOUNTER FOR VITAMIN DEFICIENCY SCREENING: ICD-10-CM

## 2025-05-01 DIAGNOSIS — M79.10 MYALGIA: ICD-10-CM

## 2025-05-01 LAB
CHOLEST SERPL-MCNC: 163 MG/DL (ref 0–200)
FOLATE SERPL-MCNC: 14 NG/ML (ref 4.78–24.2)
GLUCOSE BLDC GLUCOMTR-MCNC: 88 MG/DL (ref 70–130)
HBA1C MFR BLD: 7.5 % (ref 4.8–5.6)
HDLC SERPL-MCNC: 55 MG/DL (ref 40–60)
LDLC SERPL CALC-MCNC: 83 MG/DL (ref 0–100)
LDLC/HDLC SERPL: 1.43 {RATIO}
TRIGL SERPL-MCNC: 147 MG/DL (ref 0–150)
VIT B12 BLD-MCNC: 228 PG/ML (ref 211–946)
VLDLC SERPL-MCNC: 25 MG/DL (ref 5–40)

## 2025-05-01 PROCEDURE — 80061 LIPID PANEL: CPT | Performed by: INTERNAL MEDICINE

## 2025-05-01 PROCEDURE — 82607 VITAMIN B-12: CPT | Performed by: INTERNAL MEDICINE

## 2025-05-01 PROCEDURE — 36415 COLL VENOUS BLD VENIPUNCTURE: CPT

## 2025-05-01 PROCEDURE — 83036 HEMOGLOBIN GLYCOSYLATED A1C: CPT | Performed by: INTERNAL MEDICINE

## 2025-05-01 PROCEDURE — 82746 ASSAY OF FOLIC ACID SERUM: CPT | Performed by: INTERNAL MEDICINE

## 2025-05-01 RX ORDER — MEMANTINE HYDROCHLORIDE 5 MG/1
5 TABLET ORAL 2 TIMES DAILY
COMMUNITY

## 2025-05-01 RX ORDER — DAPAGLIFLOZIN 10 MG/1
1 TABLET, FILM COATED ORAL DAILY
COMMUNITY
Start: 2025-04-14

## 2025-05-12 ENCOUNTER — TELEPHONE (OUTPATIENT)
Dept: FAMILY MEDICINE CLINIC | Facility: CLINIC | Age: 75
End: 2025-05-12
Payer: MEDICARE

## 2025-05-12 NOTE — TELEPHONE ENCOUNTER
Caller: La Vences    Relationship: Emergency Contact    Best call back number: [phone]    What is the medical concern/diagnosis: POSSIBLE HERNIA IN GROIN AREA     What specialty or service is being requested: SURGEON     Any additional details: PATIENT WAS MOVING LUGGAGE AND LIFTING THINGS DURING A VACATION. POSSIBLE HERNIA IS CAUSING SOME DISCOMFORT.

## 2025-05-12 NOTE — TELEPHONE ENCOUNTER
I called pt wife and advised her that she just needs to make appt for Kaveh to be properly evaluated. She said that when she called the [facility] this morning, she told them she wanted to make appt for her  and person on the phone said she did not need appt, that we would send referral.  Pt will be here to be evaluated by Dr Camacho on 5/14/25.

## 2025-05-14 ENCOUNTER — OFFICE VISIT (OUTPATIENT)
Dept: FAMILY MEDICINE CLINIC | Facility: CLINIC | Age: 75
End: 2025-05-14
Payer: MEDICARE

## 2025-05-14 VITALS
OXYGEN SATURATION: 95 % | DIASTOLIC BLOOD PRESSURE: 68 MMHG | TEMPERATURE: 97.9 F | HEART RATE: 68 BPM | SYSTOLIC BLOOD PRESSURE: 110 MMHG | WEIGHT: 161 LBS | BODY MASS INDEX: 25.88 KG/M2 | HEIGHT: 66 IN

## 2025-05-14 DIAGNOSIS — F02.A11: ICD-10-CM

## 2025-05-14 DIAGNOSIS — G30.0: ICD-10-CM

## 2025-05-14 DIAGNOSIS — E11.65 TYPE 2 DIABETES MELLITUS WITH HYPERGLYCEMIA, UNSPECIFIED WHETHER LONG TERM INSULIN USE: Primary | ICD-10-CM

## 2025-05-14 DIAGNOSIS — K40.91 UNILATERAL RECURRENT INGUINAL HERNIA WITHOUT OBSTRUCTION OR GANGRENE: ICD-10-CM

## 2025-05-14 LAB
BILIRUB BLD-MCNC: NEGATIVE MG/DL
CLARITY, POC: CLEAR
COLOR UR: YELLOW
EXPIRATION DATE: ABNORMAL
GLUCOSE BLDC GLUCOMTR-MCNC: 114 MG/DL (ref 70–130)
GLUCOSE UR STRIP-MCNC: ABNORMAL MG/DL
KETONES UR QL: NEGATIVE
LEUKOCYTE EST, POC: NEGATIVE
Lab: ABNORMAL
NITRITE UR-MCNC: NEGATIVE MG/ML
PH UR: 5.5 [PH] (ref 5–8)
PROT UR STRIP-MCNC: NEGATIVE MG/DL
RBC # UR STRIP: NEGATIVE /UL
SP GR UR: 1.02 (ref 1–1.03)
UROBILINOGEN UR QL: NORMAL

## 2025-05-14 RX ORDER — FOLIC ACID 0.4 MG
400 TABLET ORAL DAILY
Qty: 30 TABLET | Refills: 2 | Status: SHIPPED | OUTPATIENT
Start: 2025-05-14 | End: 2025-08-12

## 2025-05-14 RX ORDER — BLOOD-GLUCOSE METER
1 KIT MISCELLANEOUS AS NEEDED
COMMUNITY

## 2025-05-14 RX ORDER — CALCIUM CARBONATE/VITAMIN D3 600 MG-10
250 TABLET ORAL DAILY
Qty: 30 TABLET | Refills: 2 | Status: SHIPPED | OUTPATIENT
Start: 2025-05-14 | End: 2025-08-12

## 2025-05-14 NOTE — PROGRESS NOTES
"Chief Complaint  Dementia (~ Wife is asking for a referral to Neurology for pt dementia/), Abdominal Pain (~ RLQ pain x 1 week, happened while pulling a suitcase.), and Diabetes (Wife says his BS runs 160-200 after starting Januvia.)    Subjective        Kaveh Vences presents to [facility]  History of Present Illness  Recheck.  Patient has a neurologist at [facility] but he wants when here.  Patient is doing better his blood sugar today is good it was good last time he was here that he said it runs anywhere from the 100s to the 200s.  He was started on Farxiga it was increased recently he still taking his metformin we stopped the glyburide last time we will stop the Januvia today.  He is doing well.  He has episodes where he gets forgetful but otherwise his demeanor is improved.  On the medicines discussed that there is a new infusion for the Alzheimer's disease but he would have to get some testing and MRI he will talk to the neurologist about that advised wife to call neurology and ask if they had any openings and cancellations to get put in so he can be seen sooner.  Voiced understanding he has an inguinal hernia wants looked at referral was made to surgery is advised if it is worse go to emergency room  Objective   Vital Signs:  /68 (BP Location: Right arm, Patient Position: Sitting, Cuff Size: Adult)   Pulse 68   Temp 97.9 °F (36.6 °C) (Infrared)   Ht 167.6 cm (65.98\")   Wt 73 kg (161 lb)   SpO2 95%   BMI 26.00 kg/m²   Estimated body mass index is 26 kg/m² as calculated from the following:    Height as of this encounter: 167.6 cm (65.98\").    Weight as of this encounter: 73 kg (161 lb).            Review of Systems   Gastrointestinal:         Right inguinal hernia   Psychiatric/Behavioral:  Positive for decreased concentration.    All other systems reviewed and are negative.       Physical Exam  Vitals and nursing note reviewed.   Constitutional:       Appearance: Normal " appearance.   HENT:      Head: Normocephalic.      Nose: Nose normal.      Mouth/Throat:      Mouth: Mucous membranes are moist.   Cardiovascular:      Rate and Rhythm: Regular rhythm.      Heart sounds: Murmur heard.   Pulmonary:      Effort: Pulmonary effort is normal.      Breath sounds: Normal breath sounds. No rales.   Abdominal:      Palpations: Abdomen is soft.      Tenderness: There is abdominal tenderness.          Comments: Large right inguinal hernia with Valsalva maneuver 5 cm in diameter reducible easily   Musculoskeletal:         General: No swelling.      Cervical back: No tenderness.   Skin:     General: Skin is warm and dry.   Neurological:      Mental Status: He is alert. Mental status is at baseline.   Psychiatric:         Mood and Affect: Mood normal.         Behavior: Behavior normal.        Result Review :      Data reviewed : Previous medical note           Assessment and Plan   Diagnoses and all orders for this visit:    1. Type 2 diabetes mellitus with hyperglycemia, unspecified whether long term insulin use (Primary)  -     POC Glucose  -     POC Urinalysis Dipstick, Automated    2. 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  Dispense: 30 tablet; Refill: 2  -     folic acid (V-R FOLIC ACID) 400 MCG tablet; Take 1 tablet by mouth Daily for 90 days.  Dispense: 30 tablet; Refill: 2    3. Unilateral recurrent inguinal hernia without obstruction or gangrene  -     Ambulatory Referral to General Surgery      I spent 31 minutes caring for Kaveh on this date of service. This time includes time spent by me in the following activities: preparing for the visit, reviewing tests, performing a medically appropriate examination and/or evaluation, counseling and educating the patient/family/caregiver, referring and communicating with other health care  professionals, documenting information in the medical record, and ordering medications        Follow Up   Return in about 6 months (around 11/14/2025).  Patient was given instructions and counseling regarding his condition or for health maintenance advice. Please see specific information pulled into the AVS if appropriate.

## 2025-05-26 NOTE — PROGRESS NOTES
General Surgery History and Physical      Referring Provider: Jb Camacho DO    Chief Complaint:    Right inguinal hernia    Note-patient is on Plavix and follows with Dr. Mcneill  History of Present Illness  The patient is a 74-year-old male here for evaluation of a right inguinal hernia.    He experienced an incident on 05/08/2025 while attempting to lift a suitcase during a trip to [place]. The suitcase was not excessively heavy but was entangled with another object, causing him to exert significant effort. This resulted in pain and swelling in the right inguinal region, which he had not previously noticed. He has been managing the discomfort with a belt, which provides some relief.     He has a history of coronary artery bypass grafting (CABG), cardiac catheterization, and stent placement. He also has atrial fibrillation and is currently on Plavix.  Per patient his cardiologist has advised against any surgery unless absolutely necessary due to his cardiac history.    He has been experiencing low blood pressure readings, with the lowest recorded at 90/49 and a recent reading of 96/66. He has not taken lisinopril for approximately one month. His blood pressure was previously elevated, even with lisinopril.  Of note he does report he has lost approximately 40 pounds recently.     Past Medical History:   Past Medical History:   Diagnosis Date    Atrial fibrillation     Cervical disc disorder     Chronic pain disorder     Coronary artery disease     Diabetes mellitus     Gallstones     Hyperlipidemia     Hypertension     Injury of back     Joint pain     Low back pain     Mitral regurgitation     Neck pain     Rheumatoid arthritis     Sleep apnea     Valvular disease         Past Surgical History:    Past Surgical History:   Procedure Laterality Date    BACK SURGERY  01/2019    CARDIAC CATHETERIZATION  2006, 2009, 2012, 2018    CARDIAC CATHETERIZATION Right 06/19/2023    Procedure: Left Heart Cath;  Surgeon:  Cris Mcneill MD;  Location: [facility] CATH INVASIVE LOCATION;  Service: Cardiovascular;  Laterality: Right;    CHOLECYSTECTOMY  12/22/2015    CORONARY ANGIOPLASTY  2006, 2009    CORONARY ARTERY BYPASS GRAFT  05/11/2012    x2    ROTATOR CUFF REPAIR Left     SPINE SURGERY  2019       Family History:    Family History   Problem Relation Age of Onset    Coronary artery disease Father     Coronary artery disease Brother     Heart attack Brother     Stroke Brother     Diabetes Maternal Uncle     Coronary artery disease Brother        Social History:    Social History     Socioeconomic History    Marital status:    Tobacco Use    Smoking status: Never     Passive exposure: Never    Smokeless tobacco: Never   Vaping Use    Vaping status: Never Used   Substance and Sexual Activity    Alcohol use: No    Drug use: No    Sexual activity: Yes     Partners: Female       Allergies:   No Known Allergies    Medications:     Current Outpatient Medications:     amLODIPine (NORVASC) 5 MG tablet, Take 1 tablet by mouth once daily, Disp: 90 tablet, Rfl: 2    aspirin 81 MG EC tablet, Take 1 tablet by mouth Daily., Disp: , Rfl:     atorvastatin (LIPITOR) 20 MG tablet, Take 1 tablet by mouth once daily, Disp: 90 tablet, Rfl: 1    clopidogrel (PLAVIX) 75 MG tablet, Take 1 tablet by mouth once daily, Disp: 90 tablet, Rfl: 2    Farxiga 10 MG tablet, Take 10 mg by mouth Daily., Disp: , Rfl:     folic acid (V-R FOLIC ACID) 400 MCG tablet, Take 1 tablet by mouth Daily for 90 days., Disp: 30 tablet, Rfl: 2    glucose blood test strip, 1 each by Other route As Needed. Use as instructed,  test 3 times daily, for whatever glucometer you have in stock that insurance covers, Disp: , Rfl:     glucose monitor monitoring kit, Use 1 each As Needed. Whatever you have IN STOCK that INSURANCE COVERS, Disp: , Rfl:     isosorbide mononitrate (IMDUR) 30 MG 24 hr tablet, Take 1 tablet by mouth once daily, Disp: 90 tablet, Rfl: 0    memantine  (NAMENDA) 5 MG tablet, Take 1 tablet by mouth 2 (Two) Times a Day., Disp: , Rfl:     metFORMIN ER (GLUCOPHAGE-XR) 500 MG 24 hr tablet, TAKE 2 TABLETS BY MOUTH TWICE DAILY FOR 30 DAYS, Disp: , Rfl:     nitroglycerin (NITROSTAT) 0.4 MG SL tablet, Place 1 tablet under the tongue Every 5 (Five) Minutes As Needed for Chest Pain. do not exceed a total of 3 doses in 15 minutes, Disp: 25 tablet, Rfl: 2    ranolazine (RANEXA) 1000 MG 12 hr tablet, TAKE 1 TABLET  BY MOUTH EVERY 12 HOURS, Disp: 180 tablet, Rfl: 0    tiZANidine (ZANAFLEX) 4 MG tablet, Take 1 tablet by mouth Every 8 (Eight) Hours As Needed for Muscle Spasms. (Patient taking differently: Take 1 tablet by mouth Every 8 (Eight) Hours As Needed for Muscle Spasms. prn), Disp: 90 tablet, Rfl: 5    donepezil (ARICEPT) 5 MG tablet, Take 2 tablets by mouth Daily., Disp: , Rfl:     Januvia 100 MG tablet, Take 1 tablet by mouth Daily. (Patient not taking: Reported on 5/27/2025), Disp: , Rfl:     lisinopril (PRINIVIL,ZESTRIL) 20 MG tablet, Take 1 tablet by mouth 2 (Two) Times a Day. (Patient not taking: Reported on 5/27/2025), Disp: 180 tablet, Rfl: 3    vitamin B-12 (CYANOCOBALAMIN) 250 MCG tablet, Take 1 tablet by mouth Daily for 90 days. (Patient not taking: Reported on 5/27/2025), Disp: 30 tablet, Rfl: 2    Radiology/Endoscopy:    None applicable    Labs:    Recent labs reviewed    Review of Systems:   As noted above in HPI    Physical Exam:   No acute distress, alert  Nonlabored respirations  Abdomen soft, nontender, nondistended  Right groin with reducible right inguinal hernia containing bowel, left groin with fullness and possible small hernia  Extremities warm and well-perfused with no gross deformities    Assessment & Plan  74-year-old male with right inguinal hernia, possible bilateral.    - Given his extensive cardiac history will follow-up with his cardiologist to see if he is a surgical candidate  - We discussed if he is not a surgical candidate then he would  be symptoms management, hernia belt, monitoring for signs/symptoms of bowel incarceration/strangulation/obstruction   -We discussed open and minimally invasive approaches and the risks and benefits of each- The surgery along with associate risk, benefits, terms were discussed with patient; risk discussed include but are not limited to bleeding, infection, hernia recurrence, injury to intra-abdominal structures, injury to spermatic cord structures, conversion to open  - Patient expressed understanding of everything discussed and would consider surgery if his cardiologist feels it is safe to proceed  - If we are to proceed with surgery Plavix will need to be discontinued for 5 days prior to surgery to minimize the risk of bleeding  - Advised patient to follow-up with cardiologist regarding antihypertensives and blood pressures being lower than usual  -Further recommendations pending patient with Dr. Richi Butterfield MD  General Surgery    Patient or patient representative verbalized consent for the use of Ambient Listening during the visit with  Liya Butterfield MD for chart documentation. 5/27/2025  21:34 EDT

## 2025-05-26 NOTE — H&P (VIEW-ONLY)
General Surgery History and Physical      Referring Provider: Jb Camacho DO    Chief Complaint:    Right inguinal hernia    Note-patient is on Plavix and follows with Dr. Mcneill  History of Present Illness  The patient is a 74-year-old male here for evaluation of a right inguinal hernia.    He experienced an incident on 05/08/2025 while attempting to lift a suitcase during a trip to [place]. The suitcase was not excessively heavy but was entangled with another object, causing him to exert significant effort. This resulted in pain and swelling in the right inguinal region, which he had not previously noticed. He has been managing the discomfort with a belt, which provides some relief.     He has a history of coronary artery bypass grafting (CABG), cardiac catheterization, and stent placement. He also has atrial fibrillation and is currently on Plavix.  Per patient his cardiologist has advised against any surgery unless absolutely necessary due to his cardiac history.    He has been experiencing low blood pressure readings, with the lowest recorded at 90/49 and a recent reading of 96/66. He has not taken lisinopril for approximately one month. His blood pressure was previously elevated, even with lisinopril.  Of note he does report he has lost approximately 40 pounds recently.     Past Medical History:   Past Medical History:   Diagnosis Date    Atrial fibrillation     Cervical disc disorder     Chronic pain disorder     Coronary artery disease     Diabetes mellitus     Gallstones     Hyperlipidemia     Hypertension     Injury of back     Joint pain     Low back pain     Mitral regurgitation     Neck pain     Rheumatoid arthritis     Sleep apnea     Valvular disease         Past Surgical History:    Past Surgical History:   Procedure Laterality Date    BACK SURGERY  01/2019    CARDIAC CATHETERIZATION  2006, 2009, 2012, 2018    CARDIAC CATHETERIZATION Right 06/19/2023    Procedure: Left Heart Cath;  Surgeon:  Cris Mcneill MD;  Location: [facility] CATH INVASIVE LOCATION;  Service: Cardiovascular;  Laterality: Right;    CHOLECYSTECTOMY  12/22/2015    CORONARY ANGIOPLASTY  2006, 2009    CORONARY ARTERY BYPASS GRAFT  05/11/2012    x2    ROTATOR CUFF REPAIR Left     SPINE SURGERY  2019       Family History:    Family History   Problem Relation Age of Onset    Coronary artery disease Father     Coronary artery disease Brother     Heart attack Brother     Stroke Brother     Diabetes Maternal Uncle     Coronary artery disease Brother        Social History:    Social History     Socioeconomic History    Marital status:    Tobacco Use    Smoking status: Never     Passive exposure: Never    Smokeless tobacco: Never   Vaping Use    Vaping status: Never Used   Substance and Sexual Activity    Alcohol use: No    Drug use: No    Sexual activity: Yes     Partners: Female       Allergies:   No Known Allergies    Medications:     Current Outpatient Medications:     amLODIPine (NORVASC) 5 MG tablet, Take 1 tablet by mouth once daily, Disp: 90 tablet, Rfl: 2    aspirin 81 MG EC tablet, Take 1 tablet by mouth Daily., Disp: , Rfl:     atorvastatin (LIPITOR) 20 MG tablet, Take 1 tablet by mouth once daily, Disp: 90 tablet, Rfl: 1    clopidogrel (PLAVIX) 75 MG tablet, Take 1 tablet by mouth once daily, Disp: 90 tablet, Rfl: 2    Farxiga 10 MG tablet, Take 10 mg by mouth Daily., Disp: , Rfl:     folic acid (V-R FOLIC ACID) 400 MCG tablet, Take 1 tablet by mouth Daily for 90 days., Disp: 30 tablet, Rfl: 2    glucose blood test strip, 1 each by Other route As Needed. Use as instructed,  test 3 times daily, for whatever glucometer you have in stock that insurance covers, Disp: , Rfl:     glucose monitor monitoring kit, Use 1 each As Needed. Whatever you have IN STOCK that INSURANCE COVERS, Disp: , Rfl:     isosorbide mononitrate (IMDUR) 30 MG 24 hr tablet, Take 1 tablet by mouth once daily, Disp: 90 tablet, Rfl: 0    memantine  (NAMENDA) 5 MG tablet, Take 1 tablet by mouth 2 (Two) Times a Day., Disp: , Rfl:     metFORMIN ER (GLUCOPHAGE-XR) 500 MG 24 hr tablet, TAKE 2 TABLETS BY MOUTH TWICE DAILY FOR 30 DAYS, Disp: , Rfl:     nitroglycerin (NITROSTAT) 0.4 MG SL tablet, Place 1 tablet under the tongue Every 5 (Five) Minutes As Needed for Chest Pain. do not exceed a total of 3 doses in 15 minutes, Disp: 25 tablet, Rfl: 2    ranolazine (RANEXA) 1000 MG 12 hr tablet, TAKE 1 TABLET  BY MOUTH EVERY 12 HOURS, Disp: 180 tablet, Rfl: 0    tiZANidine (ZANAFLEX) 4 MG tablet, Take 1 tablet by mouth Every 8 (Eight) Hours As Needed for Muscle Spasms. (Patient taking differently: Take 1 tablet by mouth Every 8 (Eight) Hours As Needed for Muscle Spasms. prn), Disp: 90 tablet, Rfl: 5    donepezil (ARICEPT) 5 MG tablet, Take 2 tablets by mouth Daily., Disp: , Rfl:     Januvia 100 MG tablet, Take 1 tablet by mouth Daily. (Patient not taking: Reported on 5/27/2025), Disp: , Rfl:     lisinopril (PRINIVIL,ZESTRIL) 20 MG tablet, Take 1 tablet by mouth 2 (Two) Times a Day. (Patient not taking: Reported on 5/27/2025), Disp: 180 tablet, Rfl: 3    vitamin B-12 (CYANOCOBALAMIN) 250 MCG tablet, Take 1 tablet by mouth Daily for 90 days. (Patient not taking: Reported on 5/27/2025), Disp: 30 tablet, Rfl: 2    Radiology/Endoscopy:    None applicable    Labs:    Recent labs reviewed    Review of Systems:   As noted above in HPI    Physical Exam:   No acute distress, alert  Nonlabored respirations  Abdomen soft, nontender, nondistended  Right groin with reducible right inguinal hernia containing bowel, left groin with fullness and possible small hernia  Extremities warm and well-perfused with no gross deformities    Assessment & Plan  74-year-old male with right inguinal hernia, possible bilateral.    - Given his extensive cardiac history will follow-up with his cardiologist to see if he is a surgical candidate  - We discussed if he is not a surgical candidate then he would  be symptoms management, hernia belt, monitoring for signs/symptoms of bowel incarceration/strangulation/obstruction   -We discussed open and minimally invasive approaches and the risks and benefits of each- The surgery along with associate risk, benefits, terms were discussed with patient; risk discussed include but are not limited to bleeding, infection, hernia recurrence, injury to intra-abdominal structures, injury to spermatic cord structures, conversion to open  - Patient expressed understanding of everything discussed and would consider surgery if his cardiologist feels it is safe to proceed  - If we are to proceed with surgery Plavix will need to be discontinued for 5 days prior to surgery to minimize the risk of bleeding  - Advised patient to follow-up with cardiologist regarding antihypertensives and blood pressures being lower than usual  -Further recommendations pending patient with Dr. Richi Butterfield MD  General Surgery    Patient or patient representative verbalized consent for the use of Ambient Listening during the visit with  Liya Butterfield MD for chart documentation. 5/27/2025  21:34 EDT

## 2025-05-27 ENCOUNTER — OFFICE VISIT (OUTPATIENT)
Dept: SURGERY | Facility: CLINIC | Age: 75
End: 2025-05-27
Payer: MEDICARE

## 2025-05-27 ENCOUNTER — TELEPHONE (OUTPATIENT)
Dept: CARDIOLOGY | Facility: CLINIC | Age: 75
End: 2025-05-27

## 2025-05-27 VITALS
HEART RATE: 66 BPM | SYSTOLIC BLOOD PRESSURE: 96 MMHG | WEIGHT: 161 LBS | TEMPERATURE: 97.1 F | HEIGHT: 66 IN | DIASTOLIC BLOOD PRESSURE: 67 MMHG | OXYGEN SATURATION: 96 % | BODY MASS INDEX: 25.88 KG/M2

## 2025-05-27 DIAGNOSIS — K40.90 RIGHT INGUINAL HERNIA: Primary | ICD-10-CM

## 2025-05-27 NOTE — TELEPHONE ENCOUNTER
Caller: La Vences     Relationship: WIFE    Best call back number: [phone]     What is your medical concern? PT WIFE CALLING TO REQUEST SOONER APPT FOR PT - HE IS DUE TO HAVE A HERNIA SURGERY  AND THEY ARE WANTING TO HAVE IT DONE ASAP - PT IS TAKING PLAVIX AND SHE IS CONCERNED WITH HIS BP MEDICINE AS HE IS STILL HAVING LOW BP - SHE STATES PT HAS LOST WEIGHT AND SHE ISNT SURE IF MEDICINE NEEDS READJUSTING - PLEASE ADVISE

## 2025-05-27 NOTE — PROGRESS NOTES
Cardiology Office Follow Up Visit      Primary Care Provider:  Jb Camacho DO    Reason for f/u:     C/o low BP      Subjective       History of Present Illness       Kaveh Vences is a 74 y.o. male seen in clinic today for c/o low BP.    Patient has past cardiac history of CAD status post 4V CABG with prior PCI, LBBB, ischemic cardiomyopathy with recovered EF, valvular heart disease, postop atrial fibrillation, and asymptomatic bradycardia.  Last cath in 2023 showed no vessels amenable to revascularization.  Last 2D echo in 2022 showed an EF of 60% with grade 1 diastolic dysfunction and mild MR/TR.  PMH includes HTN, HLD, DM, and mild carotid disease.    Patient is accompanied by his spouse who is supplementing history.  She tells me he has been diagnosed with early dementia.  She states that he has lost approximately 30 pounds in the last 6 to 7 months.  He tells me he is simply does not feel like eating.  He denies any night sweats or fevers.  They both report that approximately an hour after taking his morning medications he feels dizziness and nausea prompting them to check his blood pressure which has been dropping down to 90s over 40s per the report with normal blood glucose.  They have already stopped his lisinopril for the past month but still experiencing dips in blood pressure.  Otherwise he denies any chest pain or shortness of breath.  He has some dizziness with bending and stooping but has still been able to perform yard work such as weed eating.  He denies lower extremity edema.  His prior angina is described as shortness of breath which he only endorses if doing something very strenuous.  Patient is also planned for a hernia surgery under anesthesia with Dr. Liya Butterfield with timing to be announced.      ASSESSMENT/PLAN:      Diagnoses and all orders for this visit:    1. Dizziness (Primary)  -     Adult Transthoracic Echo Complete W/ Cont if Necessary Per Protocol; Future    2. Preoperative  cardiovascular examination  -     ECG 12 Lead; Future  -     Adult Transthoracic Echo Complete W/ Cont if Necessary Per Protocol; Future    3. Coronary artery disease involving autologous vein coronary bypass graft without angina pectoris  -     ECG 12 Lead; Future  -     Adult Transthoracic Echo Complete W/ Cont if Necessary Per Protocol; Future            MEDICAL DECISION MAKING:    Patient has been hypotensive in setting of weight loss r/t anorexia.  Patient remains symptomatic after stopping lisinopril, and therefore will also discontinue amlodipine.  I have asked patient to keep a BP diary for me and report back in one week.  He is anticipated for hernia repair surgery, and I want to make sure his BP is stable first.  Will also repeat 2D echo.  If no new/acute findings and BP is better next week, patient will be moderate cardiovascular risk and may hold plavix 5 days preoperatively.  Fairly recent cath as above with residual moderate CAD not amenable to revascularization.  He has no complaints worrisome for angina.  EKG is non-diagnostic given underlying LBBB.        RTC: Will keep scheduled f/u appointment with primary cardiologist, Dr. Mcneill.    Past Medical History:   Diagnosis Date    Atrial fibrillation     Cervical disc disorder     Chronic pain disorder     Coronary artery disease     Diabetes mellitus     Gallstones     Hyperlipidemia     Hypertension     Injury of back     Joint pain     Low back pain     Mitral regurgitation     Neck pain     Rheumatoid arthritis     Sleep apnea     Valvular disease        Past Surgical History:   Procedure Laterality Date    BACK SURGERY  01/2019    CARDIAC CATHETERIZATION  2006, 2009, 2012, 2018    CARDIAC CATHETERIZATION Right 06/19/2023    Procedure: Left Heart Cath;  Surgeon: Cris Mcneill MD;  Location: [facility] CATH INVASIVE LOCATION;  Service: Cardiovascular;  Laterality: Right;    CHOLECYSTECTOMY  12/22/2015    CORONARY ANGIOPLASTY  2006, 2009     CORONARY ARTERY BYPASS GRAFT  05/11/2012    x2    ROTATOR CUFF REPAIR Left     SPINE SURGERY  2019         Current Outpatient Medications:     aspirin 81 MG EC tablet, Take 1 tablet by mouth Daily., Disp: , Rfl:     atorvastatin (LIPITOR) 20 MG tablet, Take 1 tablet by mouth once daily, Disp: 90 tablet, Rfl: 1    clopidogrel (PLAVIX) 75 MG tablet, Take 1 tablet by mouth once daily, Disp: 90 tablet, Rfl: 2    Farxiga 10 MG tablet, Take 10 mg by mouth Daily., Disp: , Rfl:     folic acid (V-R FOLIC ACID) 400 MCG tablet, Take 1 tablet by mouth Daily for 90 days., Disp: 30 tablet, Rfl: 2    glucose blood test strip, 1 each by Other route As Needed. Use as instructed,  test 3 times daily, for whatever glucometer you have in stock that insurance covers, Disp: , Rfl:     glucose monitor monitoring kit, Use 1 each As Needed. Whatever you have IN STOCK that INSURANCE COVERS, Disp: , Rfl:     isosorbide mononitrate (IMDUR) 30 MG 24 hr tablet, Take 1 tablet by mouth once daily, Disp: 90 tablet, Rfl: 0    memantine (NAMENDA) 5 MG tablet, Take 1 tablet by mouth 2 (Two) Times a Day., Disp: , Rfl:     metFORMIN ER (GLUCOPHAGE-XR) 500 MG 24 hr tablet, TAKE 2 TABLETS BY MOUTH TWICE DAILY FOR 30 DAYS, Disp: , Rfl:     nitroglycerin (NITROSTAT) 0.4 MG SL tablet, Place 1 tablet under the tongue Every 5 (Five) Minutes As Needed for Chest Pain. do not exceed a total of 3 doses in 15 minutes, Disp: 25 tablet, Rfl: 2    ranolazine (RANEXA) 1000 MG 12 hr tablet, TAKE 1 TABLET  BY MOUTH EVERY 12 HOURS, Disp: 180 tablet, Rfl: 0    tiZANidine (ZANAFLEX) 4 MG tablet, Take 1 tablet by mouth Every 8 (Eight) Hours As Needed for Muscle Spasms. (Patient taking differently: Take 1 tablet by mouth Every 8 (Eight) Hours As Needed for Muscle Spasms. prn), Disp: 90 tablet, Rfl: 5    vitamin B-12 (CYANOCOBALAMIN) 250 MCG tablet, Take 1 tablet by mouth Daily for 90 days., Disp: 30 tablet, Rfl: 2    donepezil (ARICEPT) 5 MG tablet, Take 2 tablets by mouth  "Daily., Disp: , Rfl:     Social History     Socioeconomic History    Marital status:    Tobacco Use    Smoking status: Never     Passive exposure: Never    Smokeless tobacco: Never   Vaping Use    Vaping status: Never Used   Substance and Sexual Activity    Alcohol use: No    Drug use: No    Sexual activity: Yes     Partners: Female       Family History   Problem Relation Age of Onset    Coronary artery disease Father     Coronary artery disease Brother     Heart attack Brother     Stroke Brother     Diabetes Maternal Uncle     Coronary artery disease Brother        The following portions of the patient's history were reviewed and updated as appropriate: allergies, current medications, past family history, past medical history, past social history, past surgical history and problem list.    ROS  /62   Pulse 65   Ht 167.6 cm (66\")   Wt 73.8 kg (162 lb 12.8 oz)   SpO2 95%   BMI 26.28 kg/m² .  Objective     Physical Exam    Physical Exam:  Neuro:  CV:  Resp:  GI:  Ext:  Pysch: AAOx3, no gross deficits  S1S2 RRR, no murmur  Non-labored, CTA  BS+, abd soft  Pedal pulses palp, no edema  Calm and cooperative       Procedures    EKG ordered by and reviewed by me in office  EKG shows SR with LBBB.  There is no significant change from prior study.       "

## 2025-05-28 ENCOUNTER — OFFICE VISIT (OUTPATIENT)
Dept: CARDIOLOGY | Facility: CLINIC | Age: 75
End: 2025-05-28
Payer: MEDICARE

## 2025-05-28 VITALS
OXYGEN SATURATION: 95 % | HEART RATE: 65 BPM | BODY MASS INDEX: 26.16 KG/M2 | WEIGHT: 162.8 LBS | HEIGHT: 66 IN | SYSTOLIC BLOOD PRESSURE: 112 MMHG | DIASTOLIC BLOOD PRESSURE: 62 MMHG

## 2025-05-28 DIAGNOSIS — Z01.810 PREOPERATIVE CARDIOVASCULAR EXAMINATION: ICD-10-CM

## 2025-05-28 DIAGNOSIS — I25.810 CORONARY ARTERY DISEASE INVOLVING AUTOLOGOUS VEIN CORONARY BYPASS GRAFT WITHOUT ANGINA PECTORIS: ICD-10-CM

## 2025-05-28 DIAGNOSIS — R42 DIZZINESS: Primary | ICD-10-CM

## 2025-06-02 PROBLEM — K40.90 RIGHT INGUINAL HERNIA: Status: ACTIVE | Noted: 2025-05-27

## 2025-06-02 RX ORDER — SODIUM CHLORIDE 0.9 % (FLUSH) 0.9 %
3-10 SYRINGE (ML) INJECTION AS NEEDED
OUTPATIENT
Start: 2025-06-02

## 2025-06-02 RX ORDER — SODIUM CHLORIDE 9 MG/ML
40 INJECTION, SOLUTION INTRAVENOUS AS NEEDED
OUTPATIENT
Start: 2025-06-02

## 2025-06-02 RX ORDER — SODIUM CHLORIDE 0.9 % (FLUSH) 0.9 %
3 SYRINGE (ML) INJECTION EVERY 12 HOURS SCHEDULED
OUTPATIENT
Start: 2025-06-02

## 2025-06-02 RX ORDER — SODIUM CHLORIDE 9 MG/ML
100 INJECTION, SOLUTION INTRAVENOUS CONTINUOUS
OUTPATIENT
Start: 2025-06-02 | End: 2025-06-03

## 2025-06-04 ENCOUNTER — HOSPITAL ENCOUNTER (OUTPATIENT)
Dept: CARDIOLOGY | Facility: HOSPITAL | Age: 75
Discharge: HOME | End: 2025-06-04
Admitting: NURSE PRACTITIONER
Payer: MEDICARE

## 2025-06-04 VITALS
DIASTOLIC BLOOD PRESSURE: 59 MMHG | WEIGHT: 162 LBS | HEIGHT: 66 IN | HEART RATE: 60 BPM | SYSTOLIC BLOOD PRESSURE: 109 MMHG | BODY MASS INDEX: 26.03 KG/M2

## 2025-06-04 DIAGNOSIS — R42 DIZZINESS: ICD-10-CM

## 2025-06-04 DIAGNOSIS — I25.810 CORONARY ARTERY DISEASE INVOLVING AUTOLOGOUS VEIN CORONARY BYPASS GRAFT WITHOUT ANGINA PECTORIS: ICD-10-CM

## 2025-06-04 DIAGNOSIS — Z01.810 PREOPERATIVE CARDIOVASCULAR EXAMINATION: ICD-10-CM

## 2025-06-04 LAB
AORTIC DIMENSIONLESS INDEX: 0.63 (DI)
AV MEAN PRESS GRAD SYS DOP V1V2: 3.7 MMHG
AV VMAX SYS DOP: 128 CM/SEC
BH CV ECHO LEFT VENTRICLE GLOBAL LONGITUDINAL STRAIN: -15 %
BH CV ECHO MEAS - ACS: 2.26 CM
BH CV ECHO MEAS - AI P1/2T: 652.4 MSEC
BH CV ECHO MEAS - AO MAX PG: 6.6 MMHG
BH CV ECHO MEAS - AO ROOT DIAM: 3.9 CM
BH CV ECHO MEAS - AO V2 VTI: 24.7 CM
BH CV ECHO MEAS - AVA(I,D): 2.44 CM2
BH CV ECHO MEAS - EDV(CUBED): 142.1 ML
BH CV ECHO MEAS - EDV(MOD-SP4): 92.8 ML
BH CV ECHO MEAS - EF(MOD-SP4): 55.4 %
BH CV ECHO MEAS - ESV(CUBED): 50.2 ML
BH CV ECHO MEAS - ESV(MOD-SP4): 41.4 ML
BH CV ECHO MEAS - FS: 29.3 %
BH CV ECHO MEAS - IVS/LVPW: 1.25 CM
BH CV ECHO MEAS - IVSD: 1.33 CM
BH CV ECHO MEAS - LA DIMENSION: 4.5 CM
BH CV ECHO MEAS - LAT PEAK E' VEL: 5 CM/SEC
BH CV ECHO MEAS - LV DIASTOLIC VOL/BSA (35-75): 50.8 CM2
BH CV ECHO MEAS - LV MASS(C)D: 249.8 GRAMS
BH CV ECHO MEAS - LV MAX PG: 4.1 MMHG
BH CV ECHO MEAS - LV MEAN PG: 1.66 MMHG
BH CV ECHO MEAS - LV SYSTOLIC VOL/BSA (12-30): 22.6 CM2
BH CV ECHO MEAS - LV V1 MAX: 101.1 CM/SEC
BH CV ECHO MEAS - LV V1 VTI: 15.6 CM
BH CV ECHO MEAS - LVIDD: 5.2 CM
BH CV ECHO MEAS - LVIDS: 3.7 CM
BH CV ECHO MEAS - LVOT AREA: 3.9 CM2
BH CV ECHO MEAS - LVOT DIAM: 2.21 CM
BH CV ECHO MEAS - LVPWD: 1.06 CM
BH CV ECHO MEAS - MED PEAK E' VEL: 4.5 CM/SEC
BH CV ECHO MEAS - MR MAX PG: 56.2 MMHG
BH CV ECHO MEAS - MR MAX VEL: 374.9 CM/SEC
BH CV ECHO MEAS - MV A MAX VEL: 76.6 CM/SEC
BH CV ECHO MEAS - MV DEC SLOPE: 191.4 CM/SEC2
BH CV ECHO MEAS - MV DEC TIME: 0.29 SEC
BH CV ECHO MEAS - MV E MAX VEL: 56.2 CM/SEC
BH CV ECHO MEAS - MV E/A: 0.73
BH CV ECHO MEAS - MV MAX PG: 2.7 MMHG
BH CV ECHO MEAS - MV MEAN PG: 1.11 MMHG
BH CV ECHO MEAS - MV V2 VTI: 21 CM
BH CV ECHO MEAS - MVA(VTI): 2.9 CM2
BH CV ECHO MEAS - PA ACC TIME: 0.08 SEC
BH CV ECHO MEAS - PA V2 MAX: 120.8 CM/SEC
BH CV ECHO MEAS - PI END-D VEL: 142.8 CM/SEC
BH CV ECHO MEAS - PULM A REVS DUR: 0.13 SEC
BH CV ECHO MEAS - PULM A REVS VEL: 61.5 CM/SEC
BH CV ECHO MEAS - PULM DIAS VEL: 39.9 CM/SEC
BH CV ECHO MEAS - PULM S/D: 1.68
BH CV ECHO MEAS - PULM SYS VEL: 67 CM/SEC
BH CV ECHO MEAS - QP/QS: 2.46
BH CV ECHO MEAS - RAP SYSTOLE: 3 MMHG
BH CV ECHO MEAS - RV MAX PG: 3.3 MMHG
BH CV ECHO MEAS - RV V1 MAX: 90.2 CM/SEC
BH CV ECHO MEAS - RV V1 VTI: 17.3 CM
BH CV ECHO MEAS - RVDD: 3.3 CM
BH CV ECHO MEAS - RVOT DIAM: 3.3 CM
BH CV ECHO MEAS - RVSP: 36 MMHG
BH CV ECHO MEAS - SV(LVOT): 60.1 ML
BH CV ECHO MEAS - SV(MOD-SP4): 51.5 ML
BH CV ECHO MEAS - SV(RVOT): 148 ML
BH CV ECHO MEAS - SVI(LVOT): 32.9 ML/M2
BH CV ECHO MEAS - SVI(MOD-SP4): 28.1 ML/M2
BH CV ECHO MEAS - TAPSE (>1.6): 1.65 CM
BH CV ECHO MEAS - TR MAX PG: 33.3 MMHG
BH CV ECHO MEAS - TR MAX VEL: 288.4 CM/SEC
BH CV ECHO MEASUREMENTS AVERAGE E/E' RATIO: 11.83
LV EF BIPLANE MOD: 55.6 %

## 2025-06-04 PROCEDURE — 93356 MYOCRD STRAIN IMG SPCKL TRCK: CPT | Performed by: INTERNAL MEDICINE

## 2025-06-04 PROCEDURE — 93306 TTE W/DOPPLER COMPLETE: CPT

## 2025-06-04 PROCEDURE — 93356 MYOCRD STRAIN IMG SPCKL TRCK: CPT

## 2025-06-04 PROCEDURE — 93306 TTE W/DOPPLER COMPLETE: CPT | Performed by: INTERNAL MEDICINE

## 2025-06-09 RX ORDER — LISINOPRIL 10 MG/1
10 TABLET ORAL NIGHTLY
Qty: 30 TABLET | Refills: 5 | Status: SHIPPED | OUTPATIENT
Start: 2025-06-09

## 2025-06-16 ENCOUNTER — LAB (OUTPATIENT)
Dept: LAB | Facility: HOSPITAL | Age: 75
End: 2025-06-16
Payer: MEDICARE

## 2025-06-16 LAB
ABO GROUP BLD: NORMAL
ANION GAP SERPL CALCULATED.3IONS-SCNC: 9.5 MMOL/L (ref 5–15)
BLD GP AB SCN SERPL QL: NEGATIVE
BUN SERPL-MCNC: 25 MG/DL (ref 8–23)
BUN/CREAT SERPL: 25.5 (ref 7–25)
CALCIUM SPEC-SCNC: 9.3 MG/DL (ref 8.6–10.5)
CHLORIDE SERPL-SCNC: 105 MMOL/L (ref 98–107)
CO2 SERPL-SCNC: 25.5 MMOL/L (ref 22–29)
CREAT SERPL-MCNC: 0.98 MG/DL (ref 0.76–1.27)
DEPRECATED RDW RBC AUTO: 49.1 FL (ref 37–54)
EGFRCR SERPLBLD CKD-EPI 2021: 80.4 ML/MIN/1.73
ERYTHROCYTE [DISTWIDTH] IN BLOOD BY AUTOMATED COUNT: 13.2 % (ref 12.3–15.4)
GLUCOSE SERPL-MCNC: 131 MG/DL (ref 65–99)
HBA1C MFR BLD: 6.44 % (ref 4.8–5.6)
HCT VFR BLD AUTO: 42 % (ref 37.5–51)
HGB BLD-MCNC: 13.7 G/DL (ref 13–17.7)
INR PPP: 1.02 (ref 0.9–1.1)
MCH RBC QN AUTO: 32.9 PG (ref 26.6–33)
MCHC RBC AUTO-ENTMCNC: 32.6 G/DL (ref 31.5–35.7)
MCV RBC AUTO: 101 FL (ref 79–97)
PLATELET # BLD AUTO: 183 10*3/MM3 (ref 140–450)
PMV BLD AUTO: 8.7 FL (ref 6–12)
POTASSIUM SERPL-SCNC: 4.2 MMOL/L (ref 3.5–5.2)
PROTHROMBIN TIME: 13.3 SECONDS (ref 11.7–14.2)
RBC # BLD AUTO: 4.16 10*6/MM3 (ref 4.14–5.8)
RH BLD: POSITIVE
SODIUM SERPL-SCNC: 140 MMOL/L (ref 136–145)
T&S EXPIRATION DATE: NORMAL
WBC NRBC COR # BLD AUTO: 5.73 10*3/MM3 (ref 3.4–10.8)

## 2025-06-16 PROCEDURE — 85027 COMPLETE CBC AUTOMATED: CPT

## 2025-06-16 PROCEDURE — 86901 BLOOD TYPING SEROLOGIC RH(D): CPT

## 2025-06-16 PROCEDURE — 86900 BLOOD TYPING SEROLOGIC ABO: CPT

## 2025-06-16 PROCEDURE — 80048 BASIC METABOLIC PNL TOTAL CA: CPT

## 2025-06-16 PROCEDURE — 83036 HEMOGLOBIN GLYCOSYLATED A1C: CPT

## 2025-06-16 PROCEDURE — 86850 RBC ANTIBODY SCREEN: CPT

## 2025-06-16 PROCEDURE — 85610 PROTHROMBIN TIME: CPT

## 2025-06-18 RX ORDER — ISOSORBIDE MONONITRATE 30 MG/1
30 TABLET, EXTENDED RELEASE ORAL DAILY
Qty: 90 TABLET | Refills: 0 | Status: SHIPPED | OUTPATIENT
Start: 2025-06-18

## 2025-06-24 ENCOUNTER — TELEPHONE (OUTPATIENT)
Dept: SURGERY | Facility: CLINIC | Age: 75
End: 2025-06-24

## 2025-06-24 ENCOUNTER — ANESTHESIA EVENT (OUTPATIENT)
Dept: PERIOP | Facility: HOSPITAL | Age: 75
End: 2025-06-24
Payer: MEDICARE

## 2025-06-24 NOTE — TELEPHONE ENCOUNTER
Hub staff attempted to follow warm transfer process and was unsuccessful     Caller: La Vences    Relationship to patient: Emergency Contact    Best call back number: [phone]    Patient is needing: PATIENT'S WIFE CALLED TO ABOUT PROCEDURE TOMORROW. THEY HAVE NOT BEEN CALLED WITH ARRIVAL TIME/PROCEDURE TIME. SHE NEEDS TO KNOW PROCEDURE TIME TO BE PREPARED FOR HOW LONG PT WILL BE NPO. PLEASE ADVISE

## 2025-06-24 NOTE — ANESTHESIA PREPROCEDURE EVALUATION
Anesthesia Evaluation     Patient summary reviewed and Nursing notes reviewed   no history of anesthetic complications:   NPO Solid Status: > 8 hours  NPO Liquid Status: > 2 hours           Airway   Dental      Pulmonary    (+) ,sleep apnea  Cardiovascular     ECG reviewed  PT is on anticoagulation therapy    (+) hypertension, valvular problems/murmurs AI, MR and TI, CAD, CABG, dysrhythmias Atrial Fib, Paroxysmal Atrial Fib, angina, CHF Diastolic >=55%, hyperlipidemia      Neuro/Psych  (+) dizziness/light headedness, numbness  GI/Hepatic/Renal/Endo    (+) diabetes mellitus    Musculoskeletal     (+) arthralgias, chronic pain, neck pain, radiculopathy  Abdominal    Substance History      OB/GYN          Other   arthritis, autoimmune disease rheumatoid arthritis,     ROS/Med Hx Other: Additional History:  Hyperglycemia, LBBB, ischemic cardiomyopathy, pulmonary hypertension,     Echo:  Echocardiogram Findings    Left Ventricle Left ventricular systolic function is normal. Calculated left ventricular EF = 55.6% Left ventricular ejection fraction appears to be 56 - 60%.   Global longitudinal LV strain (GLS) = -15.0%. Left ventricle strain data was reviewed by the physician. Normal left ventricular cavity size and wall thickness noted. All left ventricular wall segments contract normally. Left ventricular diastolic function was normal.  Right Ventricle Normal right ventricular cavity size, wall thickness, systolic function and septal motion noted.  Left Atrium The left atrial cavity is mild to moderately dilated. Left atrial volume is severely increased.  Right Atrium Normal right atrial cavity size noted.  Aortic Valve The aortic valve is structurally normal. Mild aortic valve regurgitation is present.  Mitral Valve The mitral valve is normal in structure. Mild mitral valve regurgitation is present.  Tricuspid Valve The tricuspid valve is normal in structure. Mild tricuspid valve regurgitation is present. Estimated  right ventricular systolic pressure from tricuspid regurgitation is mildly elevated (35-45 mmHg). Mild pulmonary hypertension is present.  Pulmonic Valve The pulmonic valve is structurally normal. There is mild pulmonic valve regurgitation present.  Greater Vessels No dilation of the aortic root is present. No dilation of the sinuses of Valsalva is present. Mild dilation of the proximal aorta is present.  Pericardium The pericardium is normal. There is no evidence of pericardial effusion. .        Stress Test:  ·  Lexiscan myocardial perfusion study shows mostly fixed perfusion defect involving the anterior anteroseptal anteroapical wall consistent with underlying left bundle branch block during wall motion abnormalities and a perfusion defect than a true reversible ischemia  ·  Left ventricular ejection fraction is mildly reduced (Calculated EF = 46%).  ·  Impressions are consistent with an intermediate risk study.  ·  Clinical correlation is recommended         Cath:  Findings:     Hemodynamics Central aortic pressure systolic 125 diastolic 65 with a mean pressure of 90 mmHg  LV end-diastolic pressure of 8 mmHg  There was no gradient across the aortic valve on the pullback of the pigtail catheter  Left Main Left main is 100% occluded in the midportion  RCA Right coronary artery is a nondominant mid diffuse 90% stenosis angiographically unchanged from before  % ostial occlusion  Circ 100% ostial occlusion  SVG(s) Vein graft to marginal is patent with no significant stenosis involving the ostium of the anastomotic site  Distal portion of the body of this vein graft has angiographic focal area of 50% stenosis  This vein graft supplies the marginal branch that retrogradely fills the rest of the left circumflex system  HELENE Patent LIMA to the LAD without any significant stenosis involving the ostium/body at the anastomotic site  LAD retrogradely fills about the diagonal branches   LV Not done  Coronary  Dominance Right coronary artery     Estimated Blood Loss:  Minimal     Specimens: None         Complications:  None; patient tolerated the procedure well.           Disposition: PACU - hemodynamically stable.           Condition: stable     Impressions:  Severe native three-vessel coronary artery disease  100% occlusion of the left main  Diffuse 90% stenosis involving the proximal right coronary artery that is nondominant  Patent LIMA to the LAD  Patent vein graft to the marginal  Moderate obstructive disease involving the vein graft to the marginal  Normal left-sided filling pressures     Recommendations:  Medical management for obstructive coronary artery disease  Test results reviewed and discussed the patient and family      PSH:  CORONARY ARTERY BYPASS GRAFT CORONARY ANGIOPLASTY  ROTATOR CUFF REPAIR CHOLECYSTECTOMY  CARDIAC CATHETERIZATION BACK SURGERY  CARDIAC CATHETERIZATION SPINE SURGERY                Anesthesia Plan    ASA 4     general     (Patient identified; pre-operative vital signs, all relevant labs/studies, complete medical/surgical/anesthetic history, full medication list, full allergy list, and NPO status obtained/reviewed; physical assessment performed; anesthetic options, side effects, potential complications, risks, and benefits discussed; questions answered; verbal and/or written anesthesia consent obtained; patient cleared for procedure; anesthesia machine and equipment checked and functioning)  intravenous induction     Anesthetic plan, risks, benefits, and alternatives have been provided, discussed and informed consent has been obtained with: patient.    Use of blood products discussed with  Consented to blood products.    Plan discussed with CRNA and CAA.    CODE STATUS:

## 2025-06-25 ENCOUNTER — ANESTHESIA (OUTPATIENT)
Dept: PERIOP | Facility: HOSPITAL | Age: 75
End: 2025-06-25
Payer: MEDICARE

## 2025-06-25 ENCOUNTER — HOSPITAL ENCOUNTER (OUTPATIENT)
Facility: HOSPITAL | Age: 75
Setting detail: HOSPITAL OUTPATIENT SURGERY
Discharge: HOME | End: 2025-06-25
Attending: SURGERY | Admitting: SURGERY
Payer: MEDICARE

## 2025-06-25 VITALS
WEIGHT: 160 LBS | HEART RATE: 74 BPM | RESPIRATION RATE: 13 BRPM | HEIGHT: 66 IN | OXYGEN SATURATION: 93 % | BODY MASS INDEX: 25.71 KG/M2 | SYSTOLIC BLOOD PRESSURE: 144 MMHG | TEMPERATURE: 97.8 F | DIASTOLIC BLOOD PRESSURE: 93 MMHG

## 2025-06-25 DIAGNOSIS — K40.90 RIGHT INGUINAL HERNIA: Primary | ICD-10-CM

## 2025-06-25 LAB
GLUCOSE BLDC GLUCOMTR-MCNC: 136 MG/DL (ref 70–105)
GLUCOSE BLDC GLUCOMTR-MCNC: 144 MG/DL (ref 70–105)

## 2025-06-25 PROCEDURE — 25010000002 DEXAMETHASONE PER 1 MG: Performed by: NURSE ANESTHETIST, CERTIFIED REGISTERED

## 2025-06-25 PROCEDURE — 25010000002 LIDOCAINE PF 2% 2 % SOLUTION: Performed by: NURSE ANESTHETIST, CERTIFIED REGISTERED

## 2025-06-25 PROCEDURE — 93005 ELECTROCARDIOGRAM TRACING: CPT | Performed by: ANESTHESIOLOGY

## 2025-06-25 PROCEDURE — 25010000002 HYDROMORPHONE PER 4 MG: Performed by: NURSE ANESTHETIST, CERTIFIED REGISTERED

## 2025-06-25 PROCEDURE — 49650 LAP ING HERNIA REPAIR INIT: CPT | Performed by: SURGERY

## 2025-06-25 PROCEDURE — 25010000002 FENTANYL CITRATE (PF) 100 MCG/2ML SOLUTION: Performed by: NURSE ANESTHETIST, CERTIFIED REGISTERED

## 2025-06-25 PROCEDURE — 25810000003 LACTATED RINGERS PER 1000 ML: Performed by: NURSE ANESTHETIST, CERTIFIED REGISTERED

## 2025-06-25 PROCEDURE — 25010000002 HYDROMORPHONE 1 MG/ML SOLUTION: Performed by: NURSE ANESTHETIST, CERTIFIED REGISTERED

## 2025-06-25 PROCEDURE — 25010000002 PROPOFOL 10 MG/ML EMULSION: Performed by: NURSE ANESTHETIST, CERTIFIED REGISTERED

## 2025-06-25 PROCEDURE — 25010000002 BUPIVACAINE (PF) 0.25 % SOLUTION: Performed by: SURGERY

## 2025-06-25 PROCEDURE — 82948 REAGENT STRIP/BLOOD GLUCOSE: CPT

## 2025-06-25 PROCEDURE — 25010000002 SUGAMMADEX 200 MG/2ML SOLUTION: Performed by: NURSE ANESTHETIST, CERTIFIED REGISTERED

## 2025-06-25 PROCEDURE — 25010000002 CEFAZOLIN PER 500 MG: Performed by: SURGERY

## 2025-06-25 PROCEDURE — 25010000002 ONDANSETRON PER 1 MG: Performed by: NURSE ANESTHETIST, CERTIFIED REGISTERED

## 2025-06-25 PROCEDURE — 93010 ELECTROCARDIOGRAM REPORT: CPT | Performed by: INTERNAL MEDICINE

## 2025-06-25 PROCEDURE — C1781 MESH (IMPLANTABLE): HCPCS | Performed by: SURGERY

## 2025-06-25 PROCEDURE — 25010000002 GLYCOPYRROLATE 0.2 MG/ML SOLUTION: Performed by: NURSE ANESTHETIST, CERTIFIED REGISTERED

## 2025-06-25 DEVICE — ABSORBABLE WOUND CLOSURE DEVICE
Type: IMPLANTABLE DEVICE | Site: ABDOMEN | Status: FUNCTIONAL
Brand: V-LOC 180

## 2025-06-25 DEVICE — 3DMAX MESH, 10.8 CM X 16.0 CM (4.3" X 6.3"), RIGHT, LARGE
Type: IMPLANTABLE DEVICE | Site: ABDOMEN | Status: FUNCTIONAL
Brand: 3DMAX

## 2025-06-25 RX ORDER — HYDRALAZINE HYDROCHLORIDE 20 MG/ML
5 INJECTION INTRAMUSCULAR; INTRAVENOUS
Status: DISCONTINUED | OUTPATIENT
Start: 2025-06-25 | End: 2025-06-25 | Stop reason: HOSPADM

## 2025-06-25 RX ORDER — DIPHENHYDRAMINE HYDROCHLORIDE 50 MG/ML
12.5 INJECTION, SOLUTION INTRAMUSCULAR; INTRAVENOUS ONCE AS NEEDED
Status: DISCONTINUED | OUTPATIENT
Start: 2025-06-25 | End: 2025-06-25 | Stop reason: HOSPADM

## 2025-06-25 RX ORDER — ONDANSETRON 2 MG/ML
4 INJECTION INTRAMUSCULAR; INTRAVENOUS ONCE AS NEEDED
Status: DISCONTINUED | OUTPATIENT
Start: 2025-06-25 | End: 2025-06-25 | Stop reason: HOSPADM

## 2025-06-25 RX ORDER — ONDANSETRON 2 MG/ML
INJECTION INTRAMUSCULAR; INTRAVENOUS AS NEEDED
Status: DISCONTINUED | OUTPATIENT
Start: 2025-06-25 | End: 2025-06-25 | Stop reason: SURG

## 2025-06-25 RX ORDER — SODIUM CHLORIDE 0.9 % (FLUSH) 0.9 %
10 SYRINGE (ML) INJECTION AS NEEDED
Status: DISCONTINUED | OUTPATIENT
Start: 2025-06-25 | End: 2025-06-25 | Stop reason: HOSPADM

## 2025-06-25 RX ORDER — MEPERIDINE HYDROCHLORIDE 25 MG/ML
12.5 INJECTION INTRAMUSCULAR; INTRAVENOUS; SUBCUTANEOUS
Status: DISCONTINUED | OUTPATIENT
Start: 2025-06-25 | End: 2025-06-25 | Stop reason: HOSPADM

## 2025-06-25 RX ORDER — IPRATROPIUM BROMIDE AND ALBUTEROL SULFATE 2.5; .5 MG/3ML; MG/3ML
3 SOLUTION RESPIRATORY (INHALATION) ONCE AS NEEDED
Status: DISCONTINUED | OUTPATIENT
Start: 2025-06-25 | End: 2025-06-25 | Stop reason: HOSPADM

## 2025-06-25 RX ORDER — FENTANYL CITRATE 50 UG/ML
INJECTION, SOLUTION INTRAMUSCULAR; INTRAVENOUS AS NEEDED
Status: DISCONTINUED | OUTPATIENT
Start: 2025-06-25 | End: 2025-06-25 | Stop reason: SURG

## 2025-06-25 RX ORDER — LABETALOL HYDROCHLORIDE 5 MG/ML
5 INJECTION, SOLUTION INTRAVENOUS
Status: DISCONTINUED | OUTPATIENT
Start: 2025-06-25 | End: 2025-06-25 | Stop reason: HOSPADM

## 2025-06-25 RX ORDER — FENTANYL CITRATE 50 UG/ML
25 INJECTION, SOLUTION INTRAMUSCULAR; INTRAVENOUS
Status: DISCONTINUED | OUTPATIENT
Start: 2025-06-25 | End: 2025-06-25 | Stop reason: HOSPADM

## 2025-06-25 RX ORDER — DIPHENHYDRAMINE HYDROCHLORIDE 50 MG/ML
12.5 INJECTION, SOLUTION INTRAMUSCULAR; INTRAVENOUS
Status: DISCONTINUED | OUTPATIENT
Start: 2025-06-25 | End: 2025-06-25 | Stop reason: HOSPADM

## 2025-06-25 RX ORDER — LIDOCAINE HYDROCHLORIDE 20 MG/ML
INJECTION, SOLUTION EPIDURAL; INFILTRATION; INTRACAUDAL; PERINEURAL AS NEEDED
Status: DISCONTINUED | OUTPATIENT
Start: 2025-06-25 | End: 2025-06-25 | Stop reason: SURG

## 2025-06-25 RX ORDER — OXYCODONE HYDROCHLORIDE 5 MG/1
5 TABLET ORAL ONCE AS NEEDED
Status: COMPLETED | OUTPATIENT
Start: 2025-06-25 | End: 2025-06-25

## 2025-06-25 RX ORDER — EPHEDRINE SULFATE 5 MG/ML
5 INJECTION INTRAVENOUS ONCE AS NEEDED
Status: DISCONTINUED | OUTPATIENT
Start: 2025-06-25 | End: 2025-06-25 | Stop reason: HOSPADM

## 2025-06-25 RX ORDER — ROCURONIUM BROMIDE 10 MG/ML
INJECTION, SOLUTION INTRAVENOUS AS NEEDED
Status: DISCONTINUED | OUTPATIENT
Start: 2025-06-25 | End: 2025-06-25 | Stop reason: SURG

## 2025-06-25 RX ORDER — NALOXONE HCL 0.4 MG/ML
0.4 VIAL (ML) INJECTION AS NEEDED
Status: DISCONTINUED | OUTPATIENT
Start: 2025-06-25 | End: 2025-06-25 | Stop reason: HOSPADM

## 2025-06-25 RX ORDER — OXYCODONE HYDROCHLORIDE 5 MG/1
10 TABLET ORAL EVERY 4 HOURS PRN
Refills: 0 | Status: DISCONTINUED | OUTPATIENT
Start: 2025-06-25 | End: 2025-06-25 | Stop reason: HOSPADM

## 2025-06-25 RX ORDER — FLUMAZENIL 0.1 MG/ML
0.2 INJECTION INTRAVENOUS AS NEEDED
Status: DISCONTINUED | OUTPATIENT
Start: 2025-06-25 | End: 2025-06-25 | Stop reason: HOSPADM

## 2025-06-25 RX ORDER — PROPOFOL 10 MG/ML
VIAL (ML) INTRAVENOUS AS NEEDED
Status: DISCONTINUED | OUTPATIENT
Start: 2025-06-25 | End: 2025-06-25 | Stop reason: SURG

## 2025-06-25 RX ORDER — SODIUM CHLORIDE 0.9 % (FLUSH) 0.9 %
10 SYRINGE (ML) INJECTION EVERY 12 HOURS SCHEDULED
Status: DISCONTINUED | OUTPATIENT
Start: 2025-06-25 | End: 2025-06-25 | Stop reason: HOSPADM

## 2025-06-25 RX ORDER — SODIUM CHLORIDE, SODIUM LACTATE, POTASSIUM CHLORIDE, CALCIUM CHLORIDE 600; 310; 30; 20 MG/100ML; MG/100ML; MG/100ML; MG/100ML
9 INJECTION, SOLUTION INTRAVENOUS CONTINUOUS PRN
Status: DISCONTINUED | OUTPATIENT
Start: 2025-06-25 | End: 2025-06-25 | Stop reason: HOSPADM

## 2025-06-25 RX ORDER — OXYCODONE HYDROCHLORIDE 5 MG/1
5 TABLET ORAL ONCE AS NEEDED
Refills: 0 | Status: DISCONTINUED | OUTPATIENT
Start: 2025-06-25 | End: 2025-06-25 | Stop reason: HOSPADM

## 2025-06-25 RX ORDER — SODIUM CHLORIDE 0.9 % (FLUSH) 0.9 %
3-10 SYRINGE (ML) INJECTION AS NEEDED
Status: DISCONTINUED | OUTPATIENT
Start: 2025-06-25 | End: 2025-06-25 | Stop reason: HOSPADM

## 2025-06-25 RX ORDER — HYDROCODONE BITARTRATE AND ACETAMINOPHEN 5; 325 MG/1; MG/1
1 TABLET ORAL EVERY 6 HOURS PRN
Qty: 15 TABLET | Refills: 0 | Status: SHIPPED | OUTPATIENT
Start: 2025-06-25

## 2025-06-25 RX ORDER — SODIUM CHLORIDE 0.9 % (FLUSH) 0.9 %
3 SYRINGE (ML) INJECTION EVERY 12 HOURS SCHEDULED
Status: DISCONTINUED | OUTPATIENT
Start: 2025-06-25 | End: 2025-06-25 | Stop reason: HOSPADM

## 2025-06-25 RX ORDER — BUPIVACAINE HYDROCHLORIDE 2.5 MG/ML
INJECTION, SOLUTION EPIDURAL; INFILTRATION; INTRACAUDAL; PERINEURAL AS NEEDED
Status: DISCONTINUED | OUTPATIENT
Start: 2025-06-25 | End: 2025-06-25 | Stop reason: HOSPADM

## 2025-06-25 RX ORDER — FENTANYL CITRATE 50 UG/ML
50 INJECTION, SOLUTION INTRAMUSCULAR; INTRAVENOUS
Status: DISCONTINUED | OUTPATIENT
Start: 2025-06-25 | End: 2025-06-25 | Stop reason: HOSPADM

## 2025-06-25 RX ORDER — HYDROMORPHONE HYDROCHLORIDE 1 MG/ML
0.5 INJECTION, SOLUTION INTRAMUSCULAR; INTRAVENOUS; SUBCUTANEOUS
Status: DISCONTINUED | OUTPATIENT
Start: 2025-06-25 | End: 2025-06-25 | Stop reason: HOSPADM

## 2025-06-25 RX ORDER — SODIUM CHLORIDE 9 MG/ML
40 INJECTION, SOLUTION INTRAVENOUS AS NEEDED
Status: DISCONTINUED | OUTPATIENT
Start: 2025-06-25 | End: 2025-06-25 | Stop reason: HOSPADM

## 2025-06-25 RX ORDER — SODIUM CHLORIDE 9 MG/ML
100 INJECTION, SOLUTION INTRAVENOUS CONTINUOUS
Status: DISCONTINUED | OUTPATIENT
Start: 2025-06-25 | End: 2025-06-25 | Stop reason: HOSPADM

## 2025-06-25 RX ORDER — DEXAMETHASONE SODIUM PHOSPHATE 4 MG/ML
INJECTION, SOLUTION INTRA-ARTICULAR; INTRALESIONAL; INTRAMUSCULAR; INTRAVENOUS; SOFT TISSUE AS NEEDED
Status: DISCONTINUED | OUTPATIENT
Start: 2025-06-25 | End: 2025-06-25 | Stop reason: SURG

## 2025-06-25 RX ORDER — OXYCODONE HYDROCHLORIDE 5 MG/1
10 TABLET ORAL EVERY 4 HOURS PRN
Status: DISCONTINUED | OUTPATIENT
Start: 2025-06-25 | End: 2025-06-25 | Stop reason: HOSPADM

## 2025-06-25 RX ORDER — SODIUM CHLORIDE, SODIUM LACTATE, POTASSIUM CHLORIDE, CALCIUM CHLORIDE 600; 310; 30; 20 MG/100ML; MG/100ML; MG/100ML; MG/100ML
INJECTION, SOLUTION INTRAVENOUS CONTINUOUS PRN
Status: DISCONTINUED | OUTPATIENT
Start: 2025-06-25 | End: 2025-06-25 | Stop reason: SURG

## 2025-06-25 RX ORDER — PHENYLEPHRINE HCL IN 0.9% NACL 1 MG/10 ML
SYRINGE (ML) INTRAVENOUS AS NEEDED
Status: DISCONTINUED | OUTPATIENT
Start: 2025-06-25 | End: 2025-06-25 | Stop reason: SURG

## 2025-06-25 RX ORDER — GLYCOPYRROLATE 0.2 MG/ML
INJECTION INTRAMUSCULAR; INTRAVENOUS AS NEEDED
Status: DISCONTINUED | OUTPATIENT
Start: 2025-06-25 | End: 2025-06-25 | Stop reason: SURG

## 2025-06-25 RX ADMIN — HYDROMORPHONE HYDROCHLORIDE 0.25 MG: 1 INJECTION, SOLUTION INTRAMUSCULAR; INTRAVENOUS; SUBCUTANEOUS at 17:14

## 2025-06-25 RX ADMIN — SUGAMMADEX 200 MG: 100 INJECTION, SOLUTION INTRAVENOUS at 17:03

## 2025-06-25 RX ADMIN — OXYCODONE 5 MG: 5 TABLET ORAL at 17:42

## 2025-06-25 RX ADMIN — Medication 100 MCG: at 16:52

## 2025-06-25 RX ADMIN — DEXMEDETOMIDINE HYDROCHLORIDE 4 MCG: 400 INJECTION INTRAVENOUS at 16:05

## 2025-06-25 RX ADMIN — FENTANYL CITRATE 50 MCG: 50 INJECTION, SOLUTION INTRAMUSCULAR; INTRAVENOUS at 16:35

## 2025-06-25 RX ADMIN — SODIUM CHLORIDE, SODIUM LACTATE, POTASSIUM CHLORIDE, AND CALCIUM CHLORIDE: .6; .31; .03; .02 INJECTION, SOLUTION INTRAVENOUS at 15:56

## 2025-06-25 RX ADMIN — ROCURONIUM BROMIDE 10 MG: 10 INJECTION, SOLUTION INTRAVENOUS at 16:36

## 2025-06-25 RX ADMIN — GLYCOPYRROLATE 0.2 MG: 0.2 INJECTION INTRAMUSCULAR; INTRAVENOUS at 16:11

## 2025-06-25 RX ADMIN — PROPOFOL 150 MG: 10 INJECTION, EMULSION INTRAVENOUS at 16:00

## 2025-06-25 RX ADMIN — ROCURONIUM BROMIDE 10 MG: 10 INJECTION, SOLUTION INTRAVENOUS at 16:21

## 2025-06-25 RX ADMIN — CEFAZOLIN 2000 MG: 2 INJECTION, POWDER, FOR SOLUTION INTRAMUSCULAR; INTRAVENOUS at 15:52

## 2025-06-25 RX ADMIN — DEXMEDETOMIDINE HYDROCHLORIDE 4 MCG: 400 INJECTION INTRAVENOUS at 17:04

## 2025-06-25 RX ADMIN — HYDROMORPHONE HYDROCHLORIDE 0.25 MG: 1 INJECTION, SOLUTION INTRAMUSCULAR; INTRAVENOUS; SUBCUTANEOUS at 17:51

## 2025-06-25 RX ADMIN — ROCURONIUM BROMIDE 10 MG: 10 INJECTION, SOLUTION INTRAVENOUS at 16:13

## 2025-06-25 RX ADMIN — SODIUM CHLORIDE, SODIUM LACTATE, POTASSIUM CHLORIDE, AND CALCIUM CHLORIDE: .6; .31; .03; .02 INJECTION, SOLUTION INTRAVENOUS at 16:48

## 2025-06-25 RX ADMIN — ONDANSETRON 4 MG: 2 INJECTION, SOLUTION INTRAMUSCULAR; INTRAVENOUS at 17:03

## 2025-06-25 RX ADMIN — DEXAMETHASONE SODIUM PHOSPHATE 8 MG: 4 INJECTION, SOLUTION INTRAMUSCULAR; INTRAVENOUS at 16:17

## 2025-06-25 RX ADMIN — Medication 100 MCG: at 16:00

## 2025-06-25 RX ADMIN — FENTANYL CITRATE 50 MCG: 50 INJECTION, SOLUTION INTRAMUSCULAR; INTRAVENOUS at 16:30

## 2025-06-25 RX ADMIN — DEXMEDETOMIDINE HYDROCHLORIDE 4 MCG: 400 INJECTION INTRAVENOUS at 16:59

## 2025-06-25 RX ADMIN — Medication 100 MCG: at 16:13

## 2025-06-25 RX ADMIN — ROCURONIUM BROMIDE 40 MG: 10 INJECTION, SOLUTION INTRAVENOUS at 16:00

## 2025-06-25 RX ADMIN — LIDOCAINE HYDROCHLORIDE 60 MG: 20 INJECTION, SOLUTION EPIDURAL; INFILTRATION; INTRACAUDAL; PERINEURAL at 16:00

## 2025-06-25 RX ADMIN — Medication 100 MCG: at 16:32

## 2025-06-25 NOTE — OP NOTE
Operative Report    Patient Name:  Kaveh Vences  YOB: 1950    Date of Surgery:  6/25/2025    Pre-op Diagnosis:   Right inguinal hernia [K40.90]       Post-op Diagnosis:   Right inguinal hernia [K40.90]    Procedure:  Robotic assisted laparoscopic right    Staff:  Surgeon(s):  Liya Butterfield MD    Circulator: Brien Valente RN  Scrub Person: Hortencia Parish  Assistant: Shelia Mcfarlane CSA  was responsible for performing the following activities: Closing, Placing Dressing, Held/Positioned Camera, and bedside assist robotic case and their skilled assistance was necessary for the success of this case.    Anesthesia: General    Estimated Blood Loss: 5 mL    Implants:    Implant Name Type Inv. Item Serial No.  Lot No. LRB No. Used Action   MESH 3DMAX 4X6IN LG RT - [id number] Implant MESH 3DMAX 4X6IN LG RT  [employer]  (DIV OF [employer]) [id number] N/A 1 Implanted     Specimen:          None    Findings: Moderate-sized cord lipoma which was completely reduced and excised.  Indirect right inguinal hernia.    Complications: None immediate    Clinical Indications: The patient is a 74 year old male who was seen in the clinic for symptomatic right inguinal hernia. On exam the patient had a right inguinal hernia. Given that the inguinal hernia was symptomatic the patient was interested in surgical repair. The surgery along with the associated risks (including but not limited to bleeding, infection, hernia recurrence, injury to spermatic cord structures), benefits, and alternatives were explained to the patient. The patient expressed understanding and wished to proceed with surgery. Informed consent was obtained.    Description of Procedure: The patient was brought to the operating room and placed in the supine position. Bilateral sequential compression stockings placed on the lower extremities. The patient was induced and intubated by the Anesthesia service. The patient's left arm was tucked.  Perioperative antibiotics were administered. The patient's abdomen and bilateral groins were then prepped and draped in the usual sterile fashion. A time out was performed.    After confirming with anesthesia that an orogastric tube was in place and to suction we made a small stab incision in the left upper quadrant at Felix's point. A veress needle was then carefully placed and a water drop test performed indicating we were likely intra-abdominal. Insufflation was initiated and a low opening pressure reassured us we were intra-abdominal. We insufflated to a pressure of 15 mmHg. We placed an 8mm robotic trocar several centimeters above the umbilicus. We then introduced the camera and inspected the abdomen to ensure we had not caused any injuries with the placement of the trocar or veress needled and no injuries were noted. The veress needle was removed and the insertion site was verified to be hemostatic. Two 8mm robotic trocars were then placed on either side of the umbilicus slightly above the level of the umbilicus. The patient was placed in trendelenburg position.    The robot was then docked, a bipolar grasper was placed in the left port, and aura with electrocautery was placed in the right port.  At this point I sat down at the console to begin the dissection. An inguinal hernia was seen on the right and the other side appeared intact. A peritoneal flap was created from the level of the anterior superior iliac spine to the median umbilical ligament with aura and electrocautery. We first dissected medially with a combination of blunt and sharp dissection and identified Kirby's ligament. We dissected out the preperitoneal space and reduced the hernia sac, dissecting down to the iliopubic tract using a combination of blunt and sharp dissection. The spermatic vessels and vas deferens were identified and dissected away from the hernia sac.  A cord lipoma was reduced blunt dissection from the indirect space  and was then excised with electrocautery.  At this point we were ready to place the mesh and the area was verified to be adequately hemostatic. We introduced a large right Bard 3D Max hernia mesh. The mesh was positioned in place making sure to have adequate medial coverage. The mesh laid flat and adequately covered the defect. The mesh was secured in place to Kirby's ligament with the 3-0 prolene suture. The mesh was secured at 2 spots superiorly on either side of the inferior epigastric vessels using 3-0 prolene suture. The peritoneal flap was closed with 3-0 v-lock suture in a running fashion. The hernia repair was inspected and the mesh appeared to lay flat and was well covered with peritoneum. All of the needles were removed from the abdomen.    The two 8mm lateral trocars were removed under direct visualization and were found to be hemostatic. The patient was leveled, pneumoperitoneum was released, and the medial trocar was removed. The incisions were all closed with 4-0 vicryl in a subcuticular fashion, cleaned, and dressed with sterile dressings.    The patient tolerated the procedure well.  He was awakened and extubated by anesthesia and then transported to the recovery room in stable condition. At the completion of the case all needle, instrument, and sponge counts were correct.    Liya Butterfield MD     Date: 6/25/2025  Time: 17:12 EDT    This note was created using Dragon Voice Recognition software.

## 2025-06-25 NOTE — ANESTHESIA PROCEDURE NOTES
Airway  Reason: elective    Date/Time: 6/25/2025 4:02 PM  Airway not difficult    General Information and Staff    Patient location during procedure: OR  CRNA/CAA: Joann Ragsdale CRNA    Indications and Patient Condition  Indications for airway management: airway protection    Preoxygenated: yes  MILS maintained throughout    Mask difficulty assessment: 1 - vent by mask    Final Airway Details    Final airway type: endotracheal airway      Successful airway: ETT  Cuffed: yes   Successful intubation technique: direct laryngoscopy  Adjuncts used in placement: intubating stylet  Endotracheal tube insertion site: oral  Blade: Shaq  Blade size: 4  ETT size (mm): 7.5  Cormack-Lehane Classification: grade I - full view of glottis  Placement verified by: chest auscultation and capnometry   Measured from: lips  ETT/EBT  to lips (cm): 22  Number of attempts at approach: 1  Assessment: lips, teeth, and gum same as pre-op and atraumatic intubation

## 2025-06-25 NOTE — DISCHARGE INSTRUCTIONS
Hold Plavix for 48 hours postoperatively, may resume on 6/27/2025  Call the office to schedule followup appointment approx 2 wks postop  Keep incisions dry for first 24-48 hrs then may remove overlying bandages but leave white steristrips in place  May shower soap and water after bandages are removed but no baths/soaking x 2 weeks  No lifting >10-15 lbs x 6 wks  Over the counter stool softener twice daily until off narcotics and having regular bowel movements  Milk of magnesia as needed if still constipated with stool softeners   May not work or drive while on narcotics  May use ice to the groin region for comfort  Swelling and bruising in the groin, scrotum, penis are not normal

## 2025-06-25 NOTE — PERIOPERATIVE NURSING NOTE
DENYS Sweeney gave VO for EKG for possible EKG changes. EKG obtained and reviewed by Dr. Olmos along with pre op and PACU admit strip. Pt denies CP/SOA. Pt to f/u with Dr. oWng as out pt. Pt verbalized understanding.

## 2025-06-26 LAB
QT INTERVAL: 459 MS
QTC INTERVAL: 478 MS

## 2025-06-26 NOTE — ANESTHESIA POSTPROCEDURE EVALUATION
Patient: Kaveh Vences    Procedure Summary       Date: 06/25/25 Room / Location: [facility] OR 08 / [facility] MAIN OR    Anesthesia Start: 1556 Anesthesia Stop: 1721    Procedure: Robotic assisted laparoscopic right inguinal hernia repair with mesh, possible bilateral (Abdomen) Diagnosis:       Right inguinal hernia      (Right inguinal hernia [K40.90])    Surgeons: Liya Butterfield MD Provider: Rodrigo Olmos MD    Anesthesia Type: general ASA Status: 4            Anesthesia Type: general    Vitals  Vitals Value Taken Time   /64 06/25/25 18:15   Temp 97.8 °F (36.6 °C) 06/25/25 18:06   Pulse 69 06/25/25 18:15   Resp 13 06/25/25 18:15   SpO2 95 % 06/25/25 18:15           Post Anesthesia Care and Evaluation    Patient location during evaluation: PACU  Patient participation: complete - patient participated  Level of consciousness: awake  Pain scale: See nurse's notes for pain score.  Pain management: adequate    Airway patency: patent  Anesthetic complications: No anesthetic complications  PONV Status: none  Cardiovascular status: acceptable  Respiratory status: acceptable and spontaneous ventilation  Hydration status: acceptable    Comments: Patient seen and examined postoperatively; vital signs stable; SpO2 greater than or equal to 90%; cardiopulmonary status stable; nausea/vomiting adequately controlled; pain adequately controlled; no apparent anesthesia complications; patient discharged from anesthesia care when discharge criteria were met    Minor EKG changes post op, hx of LBBB, no chest pain.   Follow up as outpt.

## 2025-06-27 ENCOUNTER — TELEPHONE (OUTPATIENT)
Dept: SURGERY | Facility: CLINIC | Age: 75
End: 2025-06-27
Payer: MEDICARE

## 2025-07-02 ENCOUNTER — PATIENT ROUNDING (BHMG ONLY) (OUTPATIENT)
Dept: ENDOCRINOLOGY | Facility: CLINIC | Age: 75
End: 2025-07-02
Payer: MEDICARE

## 2025-07-02 ENCOUNTER — OFFICE VISIT (OUTPATIENT)
Dept: ENDOCRINOLOGY | Facility: CLINIC | Age: 75
End: 2025-07-02
Payer: MEDICARE

## 2025-07-02 VITALS
DIASTOLIC BLOOD PRESSURE: 64 MMHG | BODY MASS INDEX: 26.03 KG/M2 | OXYGEN SATURATION: 99 % | HEART RATE: 72 BPM | WEIGHT: 162 LBS | HEIGHT: 66 IN | SYSTOLIC BLOOD PRESSURE: 122 MMHG

## 2025-07-02 DIAGNOSIS — E11.65 TYPE 2 DIABETES MELLITUS WITH HYPERGLYCEMIA, WITHOUT LONG-TERM CURRENT USE OF INSULIN: Primary | ICD-10-CM

## 2025-07-02 DIAGNOSIS — E11.42 DIABETIC PERIPHERAL NEUROPATHY: ICD-10-CM

## 2025-07-02 DIAGNOSIS — I25.810 CORONARY ARTERY DISEASE INVOLVING CORONARY BYPASS GRAFT OF NATIVE HEART WITHOUT ANGINA PECTORIS: ICD-10-CM

## 2025-07-02 NOTE — PROGRESS NOTES
-----------------------------------------------------------------  ENDOCRINE CLINIC NOTE  -----------------------------------------------------------------        PATIENT NAME: Kaveh Vences  PATIENT : 1950 AGE: 74 y.o.  MRN NUMBER: [id number]  PRIMARY CARE: Jb Camacho DO    ==========================================================================    CHIEF COMPLAINT: Type 2 diabetes  DATE OF SERVICE: 25    ==========================================================================    HPI / SUBJECTIVE    74 y.o. male is seen in the clinic today for type 2 diabetes mellitus.    -Diagnosis Date:   -Last known A1c: 6.4 in 2025    -Current Therapy / Medications:  Metformin 2000 mgs daily  Farxiga 10 mgs once a day    -Past tried medications: (Not currently using)  Januvia - stopped 2 months ago  Glimepiride     -Home BG logs / monitoring: Finger sticks - fasting - 200's in the morning    -Meals / Dietary Habits:  Three meals a day    -Last eye exam: Three meals a day  -Neuropathy: -ve, noted to have discoloration of the toes  -Nephropathy: No CKD  -CAD with CABG and PCI  -No hx of CAD    ==========================================================================                                                PAST MEDICAL HISTORY    Past Medical History:   Diagnosis Date    Atrial fibrillation     Cervical disc disorder     Chronic pain disorder     Coronary artery disease     Diabetes mellitus     Gallstones     Hyperlipidemia     Hypertension     Injury of back     Joint pain     Low back pain     Mitral regurgitation     Neck pain     Rheumatoid arthritis     Sleep apnea     Valvular disease        ==========================================================================    PAST SURGICAL HISTORY    Past Surgical History:   Procedure Laterality Date    BACK SURGERY  2019    CARDIAC CATHETERIZATION  2006, , ,     CARDIAC CATHETERIZATION Right 2023    Procedure: Left Heart  Cath;  Surgeon: Cris Mcneill MD;  Location: [facility] CATH INVASIVE LOCATION;  Service: Cardiovascular;  Laterality: Right;    CHOLECYSTECTOMY  12/22/2015    CORONARY ANGIOPLASTY  2006, 2009    CORONARY ARTERY BYPASS GRAFT  05/11/2012    x2    INGUINAL HERNIA REPAIR N/A 6/25/2025    Procedure: Robotic assisted laparoscopic right inguinal hernia repair with mesh, possible bilateral;  Surgeon: Liya Butterfield MD;  Location: [facility] MAIN OR;  Service: Robotics - DaVinci;  Laterality: N/A;    ROTATOR CUFF REPAIR Left     SPINE SURGERY  2019       ==========================================================================    FAMILY HISTORY    Family History   Problem Relation Age of Onset    Coronary artery disease Father     Coronary artery disease Brother     Heart attack Brother     Stroke Brother     Diabetes Maternal Uncle     Coronary artery disease Brother        ==========================================================================    SOCIAL HISTORY    Social History     Socioeconomic History    Marital status:    Tobacco Use    Smoking status: Never     Passive exposure: Never    Smokeless tobacco: Never   Vaping Use    Vaping status: Never Used   Substance and Sexual Activity    Alcohol use: No    Drug use: No    Sexual activity: Yes     Partners: Female       ==========================================================================    MEDICATIONS      Current Outpatient Medications:     aspirin 81 MG EC tablet, Take 1 tablet by mouth Daily., Disp: , Rfl:     atorvastatin (LIPITOR) 20 MG tablet, Take 1 tablet by mouth once daily, Disp: 90 tablet, Rfl: 1    clopidogrel (PLAVIX) 75 MG tablet, Take 1 tablet by mouth once daily, Disp: 90 tablet, Rfl: 2    Farxiga 10 MG tablet, Take 10 mg by mouth Daily., Disp: , Rfl:     folic acid (V-R FOLIC ACID) 400 MCG tablet, Take 1 tablet by mouth Daily for 90 days., Disp: 30 tablet, Rfl: 2    glucose blood test strip, 1 each by Other route As Needed. Use as  instructed,  test 3 times daily, for whatever glucometer you have in stock that insurance covers, Disp: , Rfl:     glucose monitor monitoring kit, Use 1 each As Needed. Whatever you have IN STOCK that INSURANCE COVERS, Disp: , Rfl:     HYDROcodone-acetaminophen (NORCO) 5-325 MG per tablet, Take 1 tablet by mouth Every 6 (Six) Hours As Needed for Moderate Pain., Disp: 15 tablet, Rfl: 0    isosorbide mononitrate (IMDUR) 30 MG 24 hr tablet, Take 1 tablet by mouth once daily, Disp: 90 tablet, Rfl: 0    lisinopril (PRINIVIL,ZESTRIL) 10 MG tablet, Take 1 tablet by mouth Every Night., Disp: 30 tablet, Rfl: 5    memantine (NAMENDA) 5 MG tablet, Take 1 tablet by mouth 2 (Two) Times a Day., Disp: , Rfl:     metFORMIN ER (GLUCOPHAGE-XR) 500 MG 24 hr tablet, TAKE 2 TABLETS BY MOUTH TWICE DAILY FOR 30 DAYS, Disp: , Rfl:     nitroglycerin (NITROSTAT) 0.4 MG SL tablet, Place 1 tablet under the tongue Every 5 (Five) Minutes As Needed for Chest Pain. do not exceed a total of 3 doses in 15 minutes, Disp: 25 tablet, Rfl: 2    ranolazine (RANEXA) 1000 MG 12 hr tablet, TAKE 1 TABLET  BY MOUTH EVERY 12 HOURS, Disp: 180 tablet, Rfl: 0    tiZANidine (ZANAFLEX) 4 MG tablet, Take 1 tablet by mouth Every 8 (Eight) Hours As Needed for Muscle Spasms. (Patient taking differently: Take 1 tablet by mouth Every 8 (Eight) Hours As Needed for Muscle Spasms. prn), Disp: 90 tablet, Rfl: 5    vitamin B-12 (CYANOCOBALAMIN) 250 MCG tablet, Take 1 tablet by mouth Daily for 90 days., Disp: 30 tablet, Rfl: 2    donepezil (ARICEPT) 5 MG tablet, Take 2 tablets by mouth Daily., Disp: , Rfl:     ==========================================================================    ALLERGIES    No Known Allergies    ==========================================================================    OBJECTIVE    Vitals:    07/02/25 1340   BP: 122/64   Pulse: 72   SpO2: 99%     Body mass index is 26.15 kg/m².     General: Alert, cooperative, no acute distress  Thyroid:  no  enlargement/tenderness/palpable nodules  Lungs: Clear to auscultation bilaterally, respirations unlabored  Heart: Regular rate and rhythm, S1 and S2 normal, no murmur, rub or gallop  Abdomen: Soft, NT, ND and Bowel sounds Positive  Extremities:  Extremities normal, atraumatic, no cyanosis or edema    ==========================================================================    LAB EVALUATION    Lab Results   Component Value Date    GLUCOSE 131 (H) 06/16/2025    BUN 25.0 (H) 06/16/2025    CREATININE 0.98 06/16/2025    EGFRIFNONA 87 02/21/2020    EGFRIFAFRI >60 12/01/2020    BCR 25.5 (H) 06/16/2025    K 4.2 06/16/2025    CO2 25.5 06/16/2025    CALCIUM 9.3 06/16/2025    ALBUMIN 3.9 04/02/2025    AST 11 04/02/2025    ALT 20 04/02/2025    CHOL 163 05/01/2025    TRIG 147 05/01/2025    HDL 55 05/01/2025    LDL 83 05/01/2025     Lab Results   Component Value Date    HGBA1C 6.44 (H) 06/16/2025    HGBA1C 7.50 (H) 05/01/2025     Lab Results   Component Value Date    CREATININE 0.98 06/16/2025     Lab Results   Component Value Date    TSH 2.130 02/07/2024       ==========================================================================    ASSESSMENT AND PLAN    # Type 2 diabetes with hyperglycemia  # Coronary artery disease  # Diabetic peripheral neuropathy    - Patient reports to have a blood sugar in 200s in the morning whereas the patient last A1c was 6.44% on 6/16/2025  - There is a significant amount of discrepancy in the fingerstick data and also on the A1c  - Patient is currently taking metformin and Farxiga therapy, will continue with that  - Patient is a good candidate for Farxiga therapy given underlying history of coronary artery disease  - Patient was previously taking glimepiride therapy and was also taking Januvia therapy  - Given previous history of coronary artery disease will avoid Januvia therapy to avoid use for CHF exacerbation  - But you can try GLP-1 receptor agonist therapy though I will be very careful  with BMI of 26.2  - For now we will avoid any changes but provided patient with 2 samples of freestyle juice 3 and bri downloaded on the phone to have continuous glucose monitor for next at least 30 days and patient to follow-up with me back again in 4 weeks time to review the blood sugar pattern and plan adjustment of therapy accordingly  - If needed we will either try to use non-insulin based therapy but if indicated will use insulin therapy  - Therapy will stay as:  Metformin 2000 mg a day  Farxiga 10 mg p.o. daily    Thank you for courtesy of consultation.    Return to clinic: 4 weeks    Entire assessment and plan was discussed and counseled the patient in detail to which patient verbalized understanding and agreed with care.  Answered all queries and concerns.    Part of this note may be an electronic transcription/translation of spoken language to printed text using the Dragon Dictation System.     Note: Portions of this note may have been copied from previous notes but documentation have been reviewed and edited as necessary to support clinical decision making for today's visit.    ==========================================================================    INFORMATION PROVIDED TO PATIENT    Patient Instructions   Please,    - Continue metformin and Farxiga therapy.  - Start checking blood sugars with the samples of freestyle juice 3 provided to the clinic today.  - Plan for follow-up in 4 weeks time and we will review the pattern of your blood sugars and we will plan adjustment of therapy accordingly.    Thank you for your visit today.    If you have any questions or concerns please feel free to reach out of the office.       ==========================================================================  Shon Marcelo MD  Department of Endocrine, Diabetes and Metabolism  [facility], [place]  ==========================================================================

## 2025-07-02 NOTE — PATIENT INSTRUCTIONS
Please,    - Continue metformin and Farxiga therapy.  - Start checking blood sugars with the samples of freestyle juice 3 provided to the clinic today.  - Plan for follow-up in 4 weeks time and we will review the pattern of your blood sugars and we will plan adjustment of therapy accordingly.    Thank you for your visit today.    If you have any questions or concerns please feel free to reach out of the office.

## 2025-07-02 NOTE — PROGRESS NOTES
July 2, 2025    Hello, may I speak with Kaveh Vences?    My name is Jeanne      I am  with [facility]  [address] [place] [id number].    Before we get started may I verify your date of birth? 1950    I am calling to officially welcome you to our practice and ask about your recent visit. Is this a good time to talk? yes    Tell me about your visit with us. What things went well?  Good       We're always looking for ways to make our patients' experiences even better. Do you have recommendations on ways we may improve?  no    Overall were you satisfied with your first visit to our practice? yes       I appreciate you taking the time to speak with me today. Is there anything else I can do for you? no      Thank you, and have a great day.

## 2025-07-07 RX ORDER — RANOLAZINE 1000 MG/1
1 TABLET, EXTENDED RELEASE ORAL EVERY 12 HOURS SCHEDULED
Qty: 180 TABLET | Refills: 0 | Status: SHIPPED | OUTPATIENT
Start: 2025-07-07

## 2025-07-10 ENCOUNTER — OFFICE VISIT (OUTPATIENT)
Dept: SURGERY | Facility: CLINIC | Age: 75
End: 2025-07-10
Payer: MEDICARE

## 2025-07-10 VITALS
HEART RATE: 70 BPM | DIASTOLIC BLOOD PRESSURE: 75 MMHG | BODY MASS INDEX: 25.39 KG/M2 | TEMPERATURE: 97.8 F | HEIGHT: 66 IN | WEIGHT: 158 LBS | SYSTOLIC BLOOD PRESSURE: 127 MMHG | OXYGEN SATURATION: 96 %

## 2025-07-10 DIAGNOSIS — K40.90 RIGHT INGUINAL HERNIA: Primary | ICD-10-CM

## 2025-07-10 RX ORDER — MEMANTINE HYDROCHLORIDE 10 MG/1
TABLET ORAL
COMMUNITY
Start: 2025-07-08

## 2025-07-10 NOTE — PROGRESS NOTES
"Chief Complaint  Post-op Follow-up (Post op 06/25/2025 Robotic Lap Rt ing hernia repair w/ mesh, Dr. Raman)    Subjective        Kaveh Vences presents to [facility] status post robotic assisted laparoscopic right inguinal hernia repair with mesh with Dr. Raman on 6/25/2025.  Overall doing well, does report some soreness with certain movements.  Denies fevers and chills.  Tolerating regular diet without nausea or vomiting.  Having regular bowel function.      Objective   Vital Signs:  /75 (BP Location: Right arm, Patient Position: Sitting, Cuff Size: Adult)   Pulse 70   Temp 97.8 °F (36.6 °C) (Infrared)   Ht 167.6 cm (66\")   Wt 71.7 kg (158 lb)   SpO2 96%   BMI 25.50 kg/m²   Estimated body mass index is 25.5 kg/m² as calculated from the following:    Height as of this encounter: 167.6 cm (66\").    Weight as of this encounter: 71.7 kg (158 lb).            Physical Exam  Constitutional:       General: He is not in acute distress.  Cardiovascular:      Rate and Rhythm: Normal rate.   Pulmonary:      Effort: Pulmonary effort is normal. No respiratory distress.   Abdominal:      General: There is no distension.      Palpations: Abdomen is soft.      Tenderness: There is no abdominal tenderness. There is no guarding.      Comments: Incisions with mild local erythema, overall appear to be healing okay.   Neurological:      Mental Status: He is alert. Mental status is at baseline.   Psychiatric:         Mood and Affect: Mood normal.         Behavior: Behavior normal.        Result Review :                Assessment and Plan   Diagnoses and all orders for this visit:    1. Right inguinal hernia (Primary)    status post robotic assisted laparoscopic right inguinal hernia repair with mesh with Dr. Raman on 6/25/2025.  Overall doing well, does report some soreness with certain movements.  Denies fevers and chills.  Tolerating regular diet without nausea or vomiting.  Having " regular bowel function.  Discussed continuing physical restrictions of no lifting greater than 10 to 15 pounds until 6 weeks postop.  Recommended abstaining from going bowling tomorrow morning.  No dietary restrictions.  Can follow-up as needed.         Follow Up   No follow-ups on file.  Patient was given instructions and counseling regarding his condition or for health maintenance advice. Please see specific information pulled into the AVS if appropriate.

## 2025-07-25 ENCOUNTER — TELEPHONE (OUTPATIENT)
Dept: ENDOCRINOLOGY | Facility: CLINIC | Age: 75
End: 2025-07-25
Payer: MEDICARE

## 2025-07-25 NOTE — TELEPHONE ENCOUNTER
Caller: ERIKA POWELL    Relationship:[employer]    Callback number: [phone]   Is it ok to leave a message: [x] Yes [] No    Requested medication for samples: MICHAEL 3    How much medication does the patient currently have left: NONE    Who will be picking up the samples: PATIENT    Do you need information about patient financial assistance for this medication: [] Yes [x] No    Additional details provided: DR TELLEZ WANTED TO MONITOR HIS SUGARS FOR A FEW DAYS BEFORE HIS APPOINTMENT COMING UP. THE MICHAEL DID NOT HOLD ON, ONLY FOR A FEW DAYS. THEY ARE WANTING TO KNOW IF THERE IS ANY SAMPLES THAT HE CAN COME GET TODAY AND START AGAIN TODAY BEFORE HIS APPOINTMENT ON WEDNESDAY. PLEASE ADVISE

## 2025-07-30 ENCOUNTER — OFFICE VISIT (OUTPATIENT)
Dept: ENDOCRINOLOGY | Facility: CLINIC | Age: 75
End: 2025-07-30
Payer: MEDICARE

## 2025-07-30 VITALS
OXYGEN SATURATION: 100 % | HEIGHT: 66 IN | DIASTOLIC BLOOD PRESSURE: 58 MMHG | WEIGHT: 155 LBS | HEART RATE: 77 BPM | BODY MASS INDEX: 24.91 KG/M2 | SYSTOLIC BLOOD PRESSURE: 110 MMHG

## 2025-07-30 DIAGNOSIS — E11.65 TYPE 2 DIABETES MELLITUS WITH HYPERGLYCEMIA, WITHOUT LONG-TERM CURRENT USE OF INSULIN: Primary | ICD-10-CM

## 2025-07-30 DIAGNOSIS — I25.810 CORONARY ARTERY DISEASE INVOLVING CORONARY BYPASS GRAFT OF NATIVE HEART WITHOUT ANGINA PECTORIS: ICD-10-CM

## 2025-07-30 DIAGNOSIS — E11.42 DIABETIC PERIPHERAL NEUROPATHY: ICD-10-CM

## 2025-07-30 PROCEDURE — 3078F DIAST BP <80 MM HG: CPT | Performed by: INTERNAL MEDICINE

## 2025-07-30 PROCEDURE — 95251 CONT GLUC MNTR ANALYSIS I&R: CPT | Performed by: INTERNAL MEDICINE

## 2025-07-30 PROCEDURE — 99214 OFFICE O/P EST MOD 30 MIN: CPT | Performed by: INTERNAL MEDICINE

## 2025-07-30 PROCEDURE — 1159F MED LIST DOCD IN RCRD: CPT | Performed by: INTERNAL MEDICINE

## 2025-07-30 PROCEDURE — 1160F RVW MEDS BY RX/DR IN RCRD: CPT | Performed by: INTERNAL MEDICINE

## 2025-07-30 PROCEDURE — 3074F SYST BP LT 130 MM HG: CPT | Performed by: INTERNAL MEDICINE

## 2025-07-30 PROCEDURE — 3044F HG A1C LEVEL LT 7.0%: CPT | Performed by: INTERNAL MEDICINE

## 2025-07-30 RX ORDER — DAPAGLIFLOZIN 10 MG/1
1 TABLET, FILM COATED ORAL DAILY
Qty: 90 TABLET | Refills: 3 | Status: SHIPPED | OUTPATIENT
Start: 2025-07-30

## 2025-07-30 RX ORDER — AVOBENZONE, HOMOSALATE, OCTISALATE, OCTOCRYLENE 30; 40; 45; 26 MG/ML; MG/ML; MG/ML; MG/ML
1 CREAM TOPICAL DAILY
Qty: 100 EACH | Refills: 5 | Status: SHIPPED | OUTPATIENT
Start: 2025-07-30

## 2025-07-30 RX ORDER — BLOOD-GLUCOSE METER
1 KIT MISCELLANEOUS DAILY
Qty: 1 EACH | Refills: 0 | Status: SHIPPED | OUTPATIENT
Start: 2025-07-30

## 2025-07-30 RX ORDER — METFORMIN HYDROCHLORIDE 500 MG/1
1000 TABLET, EXTENDED RELEASE ORAL 2 TIMES DAILY
Qty: 360 TABLET | Refills: 3 | Status: SHIPPED | OUTPATIENT
Start: 2025-07-30

## 2025-07-30 NOTE — PATIENT INSTRUCTIONS
Please,    - Continue metformin and Farxiga therapy.  - Start checking blood sugars daily in morning 3-4 times a week and maintain logs    Repeat blood work and follow up in 3 months    Thank you for your visit today.    If you have any questions or concerns please feel free to reach out of the office.

## 2025-07-30 NOTE — PROGRESS NOTES
-----------------------------------------------------------------  ENDOCRINE CLINIC NOTE  -----------------------------------------------------------------        PATIENT NAME: Kaveh Vences  PATIENT : 1950 AGE: 74 y.o.  MRN NUMBER: [id number]  PRIMARY CARE: Jb Camacho DO    ==========================================================================    CHIEF COMPLAINT: Type 2 diabetes  DATE OF SERVICE: 25    ==========================================================================    HPI / SUBJECTIVE    74 y.o. male is seen in the clinic today for type 2 diabetes mellitus.  Diagnosis Date:   Last known A1c: 6.4 in 2025  Current Therapy / Medications:  Metformin 2000 mgs daily  Farxiga 10 mgs once a day  Past tried medications: (Not currently using)  Januvia - stopped 2 months ago  Glimepiride   Home BG logs / monitoring: Finger sticks - fasting - 200's in the morning  Meals / Dietary Habits: Three meals a day  Last eye exam: Three meals a day  Neuropathy: -ve, noted to have discoloration of the toes  Nephropathy: No CKD  CAD with CABG and PCI  No hx of CAD    ==========================================================================                                                PAST MEDICAL HISTORY    Past Medical History:   Diagnosis Date    Atrial fibrillation     Cervical disc disorder     Chronic pain disorder     Coronary artery disease     Diabetes mellitus     Gallstones     Hyperlipidemia     Hypertension     Injury of back     Joint pain     Low back pain     Mitral regurgitation     Neck pain     Rheumatoid arthritis     Sleep apnea     Valvular disease        ==========================================================================    PAST SURGICAL HISTORY    Past Surgical History:   Procedure Laterality Date    BACK SURGERY  2019    CARDIAC CATHETERIZATION  , , ,     CARDIAC CATHETERIZATION Right 2023    Procedure: Left Heart Cath;  Surgeon:  Cris Mcneill MD;  Location: [facility] CATH INVASIVE LOCATION;  Service: Cardiovascular;  Laterality: Right;    CHOLECYSTECTOMY  12/22/2015    CORONARY ANGIOPLASTY  2006, 2009    CORONARY ARTERY BYPASS GRAFT  05/11/2012    x2    INGUINAL HERNIA REPAIR N/A 6/25/2025    Procedure: Robotic assisted laparoscopic right inguinal hernia repair with mesh, possible bilateral;  Surgeon: Liya Butterfield MD;  Location: [facility] MAIN OR;  Service: Robotics - DaVinci;  Laterality: N/A;    ROTATOR CUFF REPAIR Left     SPINE SURGERY  2019       ==========================================================================    FAMILY HISTORY    Family History   Problem Relation Age of Onset    Coronary artery disease Father     Coronary artery disease Brother     Heart attack Brother     Stroke Brother     Diabetes Maternal Uncle     Coronary artery disease Brother        ==========================================================================    SOCIAL HISTORY    Social History     Socioeconomic History    Marital status:    Tobacco Use    Smoking status: Never     Passive exposure: Never    Smokeless tobacco: Never   Vaping Use    Vaping status: Never Used   Substance and Sexual Activity    Alcohol use: No    Drug use: No    Sexual activity: Yes     Partners: Female       ==========================================================================    MEDICATIONS      Current Outpatient Medications:     aspirin 81 MG EC tablet, Take 1 tablet by mouth Daily., Disp: , Rfl:     atorvastatin (LIPITOR) 20 MG tablet, Take 1 tablet by mouth once daily, Disp: 90 tablet, Rfl: 1    clopidogrel (PLAVIX) 75 MG tablet, Take 1 tablet by mouth once daily, Disp: 90 tablet, Rfl: 2    donepezil (ARICEPT) 5 MG tablet, Take 2 tablets by mouth Daily., Disp: , Rfl:     Farxiga 10 MG tablet, Take 10 mg by mouth Daily., Disp: 90 tablet, Rfl: 3    folic acid (V-R FOLIC ACID) 400 MCG tablet, Take 1 tablet by mouth Daily for 90 days., Disp: 30 tablet,  Rfl: 2    glucose blood test strip, 1 each by Other route As Needed. Use as instructed,  test 3 times daily, for whatever glucometer you have in stock that insurance covers, Disp: , Rfl:     glucose monitor monitoring kit, Use 1 each As Needed. Whatever you have IN STOCK that INSURANCE COVERS, Disp: , Rfl:     isosorbide mononitrate (IMDUR) 30 MG 24 hr tablet, Take 1 tablet by mouth once daily, Disp: 90 tablet, Rfl: 0    lisinopril (PRINIVIL,ZESTRIL) 10 MG tablet, Take 1 tablet by mouth Every Night., Disp: 30 tablet, Rfl: 5    memantine (NAMENDA) 10 MG tablet, , Disp: , Rfl:     metFORMIN ER (GLUCOPHAGE-XR) 500 MG 24 hr tablet, Take 2 tablets by mouth 2 (Two) Times a Day., Disp: 360 tablet, Rfl: 3    nitroglycerin (NITROSTAT) 0.4 MG SL tablet, Place 1 tablet under the tongue Every 5 (Five) Minutes As Needed for Chest Pain. do not exceed a total of 3 doses in 15 minutes, Disp: 25 tablet, Rfl: 2    ranolazine (RANEXA) 1000 MG 12 hr tablet, TAKE 1 TABLET BY MOUTH EVERY 12 HOURS, Disp: 180 tablet, Rfl: 0    tiZANidine (ZANAFLEX) 4 MG tablet, Take 1 tablet by mouth Every 8 (Eight) Hours As Needed for Muscle Spasms. (Patient taking differently: Take 1 tablet by mouth Every 8 (Eight) Hours As Needed for Muscle Spasms. prn), Disp: 90 tablet, Rfl: 5    glucose blood test strip, 1 each by Other route Daily., Disp: 100 each, Rfl: 5    glucose monitor monitoring kit, Use 1 each Daily., Disp: 1 each, Rfl: 0    Lancets misc, Use 1 each Daily., Disp: 100 each, Rfl: 5    ==========================================================================    ALLERGIES    No Known Allergies    ==========================================================================    OBJECTIVE    Vitals:    07/30/25 1213   BP: 110/58   Pulse: 77   SpO2: 100%     Body mass index is 25.03 kg/m².     General: Alert, cooperative, no acute distress    ==========================================================================    LAB EVALUATION    Lab Results    Component Value Date    GLUCOSE 131 (H) 06/16/2025    BUN 25.0 (H) 06/16/2025    CREATININE 0.98 06/16/2025    EGFRIFNONA 87 02/21/2020    EGFRIFAFRI >60 12/01/2020    BCR 25.5 (H) 06/16/2025    K 4.2 06/16/2025    CO2 25.5 06/16/2025    CALCIUM 9.3 06/16/2025    ALBUMIN 3.9 04/02/2025    AST 11 04/02/2025    ALT 20 04/02/2025    CHOL 163 05/01/2025    TRIG 147 05/01/2025    HDL 55 05/01/2025    LDL 83 05/01/2025     Lab Results   Component Value Date    HGBA1C 6.44 (H) 06/16/2025    HGBA1C 7.50 (H) 05/01/2025     Lab Results   Component Value Date    CREATININE 0.98 06/16/2025     Lab Results   Component Value Date    TSH 2.130 02/07/2024     ==========================================================================    CGM Data:    Dates: July 17 till Jul 30, 2025    Very High > 250: 4%  High 180 - 250: 22%  Target: 70 - 180: 73%  Low 55 - 70:  0%  Very Low < 55: 1%    ==========================================================================    ASSESSMENT AND PLAN    # Type 2 diabetes with hyperglycemia  # Coronary artery disease  # Diabetic peripheral neuropathy    - Reviewed CGM data and patient have postprandial hyperglycemia because of dietary habits.  Patient is consuming high carb and low protein diet  - Discussed with patient in detail about consuming high-protein low-carb diet  - For now we will continue same therapy without any changes  - Will be very careful about using GLP-1 receptor agonist therapy because patient is already complaining of unintentional weight loss  - If needed will use DPP 4 inhibitor therapy though I will be very careful with that part as well given previous history of coronary artery disease but we can trial low-dose if needed  - Will completely avoid pioglitazone therapy to avoid risk for congestive heart failure exacerbation  - Therapy will stay as:  Metformin 2000 mg a day  Farxiga 10 mg p.o. daily  - Patient to check blood sugar through fingersticks given patient is not on  insulin therapy it will not be covered through the insurance  - Blood sugar to be monitored once a day in the morning 2-3 times a week  - Repeat blood work and clinical follow-up in 3 months    Thank you for courtesy of consultation.    Return to clinic: 3 months    Entire assessment and plan was discussed and counseled the patient in detail to which patient verbalized understanding and agreed with care.  Answered all queries and concerns.    Part of this note may be an electronic transcription/translation of spoken language to printed text using the Dragon Dictation System.     Note: Portions of this note may have been copied from previous notes but documentation have been reviewed and edited as necessary to support clinical decision making for today's visit.    ==========================================================================    INFORMATION PROVIDED TO PATIENT    Patient Instructions   Please,    - Continue metformin and Farxiga therapy.  - Start checking blood sugars daily in morning 3-4 times a week and maintain logs    Repeat blood work and follow up in 3 months    Thank you for your visit today.    If you have any questions or concerns please feel free to reach out of the office.       ==========================================================================  Shon Marcelo MD  Department of Endocrine, Diabetes and Metabolism  [place], [place]  ==========================================================================

## 2025-08-05 ENCOUNTER — OFFICE VISIT (OUTPATIENT)
Dept: CARDIOLOGY | Facility: CLINIC | Age: 75
End: 2025-08-05
Payer: MEDICARE

## 2025-08-05 VITALS
HEIGHT: 66 IN | SYSTOLIC BLOOD PRESSURE: 129 MMHG | DIASTOLIC BLOOD PRESSURE: 78 MMHG | WEIGHT: 157 LBS | RESPIRATION RATE: 16 BRPM | OXYGEN SATURATION: 97 % | BODY MASS INDEX: 25.23 KG/M2 | HEART RATE: 77 BPM

## 2025-08-05 DIAGNOSIS — E78.2 MIXED HYPERLIPIDEMIA: ICD-10-CM

## 2025-08-05 DIAGNOSIS — I10 PRIMARY HYPERTENSION: ICD-10-CM

## 2025-08-05 DIAGNOSIS — Z95.1 S/P CABG X 4: Primary | ICD-10-CM

## 2025-08-05 DIAGNOSIS — I25.5 ISCHEMIC CARDIOMYOPATHY: ICD-10-CM

## 2025-08-05 PROCEDURE — 1159F MED LIST DOCD IN RCRD: CPT | Performed by: INTERNAL MEDICINE

## 2025-08-05 PROCEDURE — 99214 OFFICE O/P EST MOD 30 MIN: CPT | Performed by: INTERNAL MEDICINE

## 2025-08-05 PROCEDURE — 3074F SYST BP LT 130 MM HG: CPT | Performed by: INTERNAL MEDICINE

## 2025-08-05 PROCEDURE — 1160F RVW MEDS BY RX/DR IN RCRD: CPT | Performed by: INTERNAL MEDICINE

## 2025-08-05 PROCEDURE — 3078F DIAST BP <80 MM HG: CPT | Performed by: INTERNAL MEDICINE

## 2025-08-05 RX ORDER — TRIAMCINOLONE ACETONIDE 40 MG/ML
80 INJECTION, SUSPENSION INTRA-ARTICULAR; INTRAMUSCULAR
COMMUNITY
Start: 2025-07-31

## 2025-08-05 RX ORDER — LIDOCAINE HYDROCHLORIDE 10 MG/ML
2 INJECTION, SOLUTION INFILTRATION; PERINEURAL
COMMUNITY
Start: 2025-07-31

## 2025-08-06 ENCOUNTER — OFFICE VISIT (OUTPATIENT)
Dept: FAMILY MEDICINE CLINIC | Facility: CLINIC | Age: 75
End: 2025-08-06
Payer: MEDICARE

## 2025-08-06 ENCOUNTER — TELEPHONE (OUTPATIENT)
Dept: FAMILY MEDICINE CLINIC | Facility: CLINIC | Age: 75
End: 2025-08-06

## 2025-08-06 VITALS
RESPIRATION RATE: 16 BRPM | HEART RATE: 69 BPM | OXYGEN SATURATION: 95 % | WEIGHT: 157.7 LBS | DIASTOLIC BLOOD PRESSURE: 69 MMHG | SYSTOLIC BLOOD PRESSURE: 114 MMHG | BODY MASS INDEX: 25.34 KG/M2 | HEIGHT: 66 IN | TEMPERATURE: 96.6 F

## 2025-08-06 DIAGNOSIS — B35.4 TINEA CORPORIS: ICD-10-CM

## 2025-08-06 DIAGNOSIS — E11.65 TYPE 2 DIABETES MELLITUS WITH HYPERGLYCEMIA, UNSPECIFIED WHETHER LONG TERM INSULIN USE: ICD-10-CM

## 2025-08-06 DIAGNOSIS — E78.2 MIXED HYPERLIPIDEMIA: Primary | ICD-10-CM

## 2025-08-06 DIAGNOSIS — I25.708 ATHEROSCLEROSIS OF CORONARY ARTERY BYPASS GRAFT(S), UNSPECIFIED, WITH OTHER FORMS OF ANGINA PECTORIS: ICD-10-CM

## 2025-08-06 RX ORDER — CLOTRIMAZOLE AND BETAMETHASONE DIPROPIONATE 10; .64 MG/G; MG/G
1 CREAM TOPICAL 2 TIMES DAILY
Qty: 30 G | Refills: 0 | Status: SHIPPED | OUTPATIENT
Start: 2025-08-06 | End: 2025-08-20

## 2025-08-06 RX ORDER — DAPAGLIFLOZIN 5 MG/1
5 TABLET, FILM COATED ORAL DAILY
Qty: 30 TABLET | Refills: 0 | Status: SHIPPED | OUTPATIENT
Start: 2025-08-06 | End: 2025-11-04

## 2025-08-19 ENCOUNTER — TELEPHONE (OUTPATIENT)
Dept: PAIN MEDICINE | Facility: CLINIC | Age: 75
End: 2025-08-19
Payer: MEDICARE

## 2025-08-19 ENCOUNTER — OFFICE VISIT (OUTPATIENT)
Dept: PAIN MEDICINE | Facility: CLINIC | Age: 75
End: 2025-08-19
Payer: MEDICARE

## 2025-08-19 VITALS
SYSTOLIC BLOOD PRESSURE: 132 MMHG | WEIGHT: 157 LBS | DIASTOLIC BLOOD PRESSURE: 80 MMHG | OXYGEN SATURATION: 98 % | BODY MASS INDEX: 25.36 KG/M2 | HEART RATE: 72 BPM | RESPIRATION RATE: 16 BRPM

## 2025-08-19 DIAGNOSIS — M47.817 LUMBOSACRAL SPONDYLOSIS WITHOUT MYELOPATHY: Primary | ICD-10-CM

## 2025-08-19 DIAGNOSIS — M79.18 MYOFASCIAL PAIN SYNDROME: ICD-10-CM

## 2025-08-19 DIAGNOSIS — M25.50 POLYARTHRALGIA: ICD-10-CM

## 2025-08-20 RX ORDER — ATORVASTATIN CALCIUM 20 MG/1
20 TABLET, FILM COATED ORAL DAILY
Qty: 90 TABLET | Refills: 0 | Status: SHIPPED | OUTPATIENT
Start: 2025-08-20

## (undated) DEVICE — LAPAROVUE VISIBILITY SYSTEM LAPAROSCOPIC SOLUTIONS: Brand: LAPAROVUE

## (undated) DEVICE — GENERAL LAPAROSCOPY CDS: Brand: MEDLINE INDUSTRIES, INC.

## (undated) DEVICE — SYRINGE, LOCKING, 10CC, STERILE: Brand: BABY GORILLA/GORILLA PLATING SYSTEM

## (undated) DEVICE — ENDOPATH PNEUMONEEDLE INSUFFLATION NEEDLES WITH LUER LOCK CONNECTORS 120MM: Brand: ENDOPATH

## (undated) DEVICE — ANESTH CIRCUIT 60IN 3LTR-LF: Brand: MEDLINE INDUSTRIES, INC.

## (undated) DEVICE — GLV SURG SENSICARE POLYISPRN W/ALOE PF LF 6.5 GRN STRL

## (undated) DEVICE — GLOVE,SURG,SENSICARE SLT,LF,PF,6: Brand: MEDLINE

## (undated) DEVICE — MARKER SKIN W/RULER DUAL: Brand: MEDLINE INDUSTRIES, INC.

## (undated) DEVICE — THE STERILE LIGHT HANDLE COVER IS USED WITH STERIS SURGICAL LIGHTING AND VISUALIZATION SYSTEMS.

## (undated) DEVICE — COLUMN DRAPE

## (undated) DEVICE — KT SURG TURNOVER 050

## (undated) DEVICE — BLADELESS OBTURATOR: Brand: WECK VISTA

## (undated) DEVICE — SEAL

## (undated) DEVICE — ANTIBACTERIAL UNDYED BRAIDED (POLYGLACTIN 910), SYNTHETIC ABSORBABLE SUTURE: Brand: COATED VICRYL

## (undated) DEVICE — BNDG ADHS PLSTC 1X3IN LF

## (undated) DEVICE — UNDRGLV SURG BIOGEL PIMICROINDICATOR SYNTH SZ8 LF STRL

## (undated) DEVICE — DRAPE SHEET ULTRAGARD: Brand: MEDLINE

## (undated) DEVICE — TIP COVER ACCESSORY

## (undated) DEVICE — THE STERILE CAMERA HANDLE COVER IS FOR USE WITH THE STERIS SURGICAL LIGHTING AND VISUALIZATION SYSTEMS.

## (undated) DEVICE — SYR LL TP 10ML STRL

## (undated) DEVICE — SOL IRR H2O BO 1000ML STRL

## (undated) DEVICE — 3M™ STERI-STRIP™ REINFORCED ADHESIVE SKIN CLOSURES, R1547, 1/2 IN X 4 IN (12 MM X 100 MM), 6 STRIPS/ENVELOPE: Brand: 3M™ STERI-STRIP™

## (undated) DEVICE — SUT PROLN 3/0 SH D/A 36IN 8522H

## (undated) DEVICE — GAUZE,SPONGE,4"X4",16PLY,XRAY,STRL,LF: Brand: MEDLINE

## (undated) DEVICE — TBG INSUFF MALE L/L W 12MM CON: Brand: MEDLINE INDUSTRIES, INC.

## (undated) DEVICE — 40580 - THE PINK PAD - ADVANCED TRENDELENBURG POSITIONING KIT: Brand: 40580 - THE PINK PAD - ADVANCED TRENDELENBURG POSITIONING KIT

## (undated) DEVICE — ARM DRAPE

## (undated) DEVICE — BLANKT WARM UPPR/BDY ARM/OUT 57X196CM